# Patient Record
Sex: FEMALE | Race: WHITE | NOT HISPANIC OR LATINO | Employment: OTHER | URBAN - METROPOLITAN AREA
[De-identification: names, ages, dates, MRNs, and addresses within clinical notes are randomized per-mention and may not be internally consistent; named-entity substitution may affect disease eponyms.]

---

## 2017-05-17 ENCOUNTER — SURGERY - HEALTHEAST (OUTPATIENT)
Dept: CARDIOLOGY | Facility: CLINIC | Age: 82
End: 2017-05-17

## 2017-05-17 ASSESSMENT — MIFFLIN-ST. JEOR: SCORE: 1011.43

## 2017-05-18 ASSESSMENT — MIFFLIN-ST. JEOR: SCORE: 1002.36

## 2017-05-31 ENCOUNTER — AMBULATORY - HEALTHEAST (OUTPATIENT)
Dept: CARDIOLOGY | Facility: CLINIC | Age: 82
End: 2017-05-31

## 2017-05-31 DIAGNOSIS — Z95.0 CARDIAC PACEMAKER IN SITU: ICD-10-CM

## 2017-05-31 LAB — HCC DEVICE COMMENTS: NORMAL

## 2017-05-31 ASSESSMENT — MIFFLIN-ST. JEOR: SCORE: 1034.11

## 2017-06-17 ENCOUNTER — AMBULATORY - HEALTHEAST (OUTPATIENT)
Dept: CARDIOLOGY | Facility: CLINIC | Age: 82
End: 2017-06-17

## 2017-06-17 DIAGNOSIS — Z95.0 CARDIAC PACEMAKER IN SITU: ICD-10-CM

## 2017-06-19 LAB — HCC DEVICE COMMENTS: NORMAL

## 2017-08-30 ENCOUNTER — AMBULATORY - HEALTHEAST (OUTPATIENT)
Dept: CARDIOLOGY | Facility: CLINIC | Age: 82
End: 2017-08-30

## 2017-08-30 DIAGNOSIS — Z95.0 CARDIAC PACEMAKER IN SITU: ICD-10-CM

## 2017-08-30 LAB — HCC DEVICE COMMENTS: NORMAL

## 2017-08-30 ASSESSMENT — MIFFLIN-ST. JEOR: SCORE: 1038.65

## 2017-09-26 ENCOUNTER — AMBULATORY - HEALTHEAST (OUTPATIENT)
Dept: CARDIOLOGY | Facility: CLINIC | Age: 82
End: 2017-09-26

## 2017-09-26 ENCOUNTER — RECORDS - HEALTHEAST (OUTPATIENT)
Dept: ADMINISTRATIVE | Facility: OTHER | Age: 82
End: 2017-09-26

## 2017-09-28 ENCOUNTER — AMBULATORY - HEALTHEAST (OUTPATIENT)
Dept: CARDIOLOGY | Facility: CLINIC | Age: 82
End: 2017-09-28

## 2017-09-28 ENCOUNTER — RECORDS - HEALTHEAST (OUTPATIENT)
Dept: ADMINISTRATIVE | Facility: OTHER | Age: 82
End: 2017-09-28

## 2017-10-03 ENCOUNTER — OFFICE VISIT - HEALTHEAST (OUTPATIENT)
Dept: CARDIOLOGY | Facility: CLINIC | Age: 82
End: 2017-10-03

## 2017-10-03 DIAGNOSIS — R55 SYNCOPE, CARDIOGENIC: ICD-10-CM

## 2017-10-03 DIAGNOSIS — I25.83 CORONARY ARTERY DISEASE DUE TO LIPID RICH PLAQUE: ICD-10-CM

## 2017-10-03 DIAGNOSIS — I25.10 CORONARY ARTERY DISEASE DUE TO LIPID RICH PLAQUE: ICD-10-CM

## 2017-10-03 DIAGNOSIS — I48.0 PAROXYSMAL ATRIAL FIBRILLATION (H): ICD-10-CM

## 2017-10-03 ASSESSMENT — MIFFLIN-ST. JEOR: SCORE: 1007.46

## 2017-10-16 ENCOUNTER — RECORDS - HEALTHEAST (OUTPATIENT)
Dept: LAB | Facility: CLINIC | Age: 82
End: 2017-10-16

## 2017-10-16 LAB
CHOLEST SERPL-MCNC: 182 MG/DL
FASTING STATUS PATIENT QL REPORTED: ABNORMAL
HDLC SERPL-MCNC: 47 MG/DL
LDLC SERPL CALC-MCNC: 115 MG/DL
TRIGL SERPL-MCNC: 102 MG/DL

## 2017-10-19 ENCOUNTER — AMBULATORY - HEALTHEAST (OUTPATIENT)
Dept: CARDIOLOGY | Facility: CLINIC | Age: 82
End: 2017-10-19

## 2017-11-27 ENCOUNTER — RECORDS - HEALTHEAST (OUTPATIENT)
Dept: ADMINISTRATIVE | Facility: OTHER | Age: 82
End: 2017-11-27

## 2017-11-28 ENCOUNTER — AMBULATORY - HEALTHEAST (OUTPATIENT)
Dept: CARDIOLOGY | Facility: CLINIC | Age: 82
End: 2017-11-28

## 2017-11-28 DIAGNOSIS — Z95.0 CARDIAC PACEMAKER IN SITU: ICD-10-CM

## 2017-11-28 LAB — HCC DEVICE COMMENTS: NORMAL

## 2017-11-29 ENCOUNTER — HOSPITAL ENCOUNTER (OUTPATIENT)
Dept: CT IMAGING | Facility: CLINIC | Age: 82
Discharge: HOME | End: 2017-11-29
Attending: FAMILY MEDICINE

## 2017-11-29 ENCOUNTER — COMMUNICATION - HEALTHEAST (OUTPATIENT)
Dept: TELEHEALTH | Facility: CLINIC | Age: 82
End: 2017-11-29

## 2017-11-29 DIAGNOSIS — R51.9 FRONTAL HEADACHE: ICD-10-CM

## 2018-01-23 ENCOUNTER — COMMUNICATION - HEALTHEAST (OUTPATIENT)
Dept: CARDIOLOGY | Facility: CLINIC | Age: 83
End: 2018-01-23

## 2018-01-23 DIAGNOSIS — I25.10 CAD (CORONARY ARTERY DISEASE): ICD-10-CM

## 2018-03-15 ENCOUNTER — AMBULATORY - HEALTHEAST (OUTPATIENT)
Dept: CARDIOLOGY | Facility: CLINIC | Age: 83
End: 2018-03-15

## 2018-03-15 DIAGNOSIS — Z95.0 CARDIAC PACEMAKER IN SITU: ICD-10-CM

## 2018-03-16 LAB — HCC DEVICE COMMENTS: NORMAL

## 2018-06-19 ENCOUNTER — AMBULATORY - HEALTHEAST (OUTPATIENT)
Dept: CARDIOLOGY | Facility: CLINIC | Age: 83
End: 2018-06-19

## 2018-06-19 ENCOUNTER — RECORDS - HEALTHEAST (OUTPATIENT)
Dept: ADMINISTRATIVE | Facility: OTHER | Age: 83
End: 2018-06-19

## 2018-06-21 ENCOUNTER — AMBULATORY - HEALTHEAST (OUTPATIENT)
Dept: CARDIOLOGY | Facility: CLINIC | Age: 83
End: 2018-06-21

## 2018-06-21 ENCOUNTER — OFFICE VISIT - HEALTHEAST (OUTPATIENT)
Dept: CARDIOLOGY | Facility: CLINIC | Age: 83
End: 2018-06-21

## 2018-06-21 DIAGNOSIS — Z95.0 CARDIAC PACEMAKER IN SITU: ICD-10-CM

## 2018-06-21 DIAGNOSIS — I50.32 CHRONIC DIASTOLIC CONGESTIVE HEART FAILURE (H): ICD-10-CM

## 2018-06-21 DIAGNOSIS — I49.5 SSS (SICK SINUS SYNDROME) (H): ICD-10-CM

## 2018-06-21 DIAGNOSIS — I25.10 CORONARY ARTERY DISEASE INVOLVING NATIVE HEART WITHOUT ANGINA PECTORIS, UNSPECIFIED VESSEL OR LESION TYPE: ICD-10-CM

## 2018-06-21 DIAGNOSIS — I10 ESSENTIAL HYPERTENSION WITH GOAL BLOOD PRESSURE LESS THAN 140/90: ICD-10-CM

## 2018-06-21 LAB
ANION GAP SERPL CALCULATED.3IONS-SCNC: 10 MMOL/L (ref 5–18)
BUN SERPL-MCNC: 14 MG/DL (ref 8–28)
CALCIUM SERPL-MCNC: 9.7 MG/DL (ref 8.5–10.5)
CHLORIDE BLD-SCNC: 103 MMOL/L (ref 98–107)
CO2 SERPL-SCNC: 26 MMOL/L (ref 22–31)
CREAT SERPL-MCNC: 0.73 MG/DL (ref 0.6–1.1)
GFR SERPL CREATININE-BSD FRML MDRD: >60 ML/MIN/1.73M2
GLUCOSE BLD-MCNC: 83 MG/DL (ref 70–125)
HCC DEVICE COMMENTS: NORMAL
HCC DEVICE IMPLANTING PROVIDER: NORMAL
HCC DEVICE MANUFACTURE: NORMAL
HCC DEVICE MODEL: NORMAL
HCC DEVICE SERIAL NUMBER: NORMAL
HCC DEVICE TYPE: NORMAL
MAGNESIUM SERPL-MCNC: 2.4 MG/DL (ref 1.8–2.6)
POTASSIUM BLD-SCNC: 4.4 MMOL/L (ref 3.5–5)
SODIUM SERPL-SCNC: 139 MMOL/L (ref 136–145)

## 2018-06-21 ASSESSMENT — MIFFLIN-ST. JEOR: SCORE: 981.95

## 2018-06-22 ENCOUNTER — COMMUNICATION - HEALTHEAST (OUTPATIENT)
Dept: CARDIOLOGY | Facility: CLINIC | Age: 83
End: 2018-06-22

## 2018-06-22 DIAGNOSIS — R60.9 EDEMA: ICD-10-CM

## 2018-06-22 LAB — BNP SERPL-MCNC: 99 PG/ML (ref 0–167)

## 2018-06-28 ENCOUNTER — HOSPITAL ENCOUNTER (OUTPATIENT)
Dept: LAB | Age: 83
Setting detail: SPECIMEN
Discharge: HOME | End: 2018-06-28

## 2018-06-28 ENCOUNTER — OFFICE VISIT - HEALTHEAST (OUTPATIENT)
Dept: CARDIOLOGY | Facility: CLINIC | Age: 83
End: 2018-06-28

## 2018-06-28 ENCOUNTER — COMMUNICATION - HEALTHEAST (OUTPATIENT)
Dept: CARDIOLOGY | Facility: CLINIC | Age: 83
End: 2018-06-28

## 2018-06-28 ENCOUNTER — AMBULATORY - HEALTHEAST (OUTPATIENT)
Dept: CARDIOLOGY | Facility: CLINIC | Age: 83
End: 2018-06-28

## 2018-06-28 DIAGNOSIS — I10 ESSENTIAL HYPERTENSION WITH GOAL BLOOD PRESSURE LESS THAN 140/90: ICD-10-CM

## 2018-06-28 DIAGNOSIS — R60.9 EDEMA: ICD-10-CM

## 2018-06-28 DIAGNOSIS — I49.5 SSS (SICK SINUS SYNDROME) (H): ICD-10-CM

## 2018-06-28 DIAGNOSIS — I25.10 CORONARY ARTERY DISEASE INVOLVING NATIVE HEART WITHOUT ANGINA PECTORIS, UNSPECIFIED VESSEL OR LESION TYPE: ICD-10-CM

## 2018-06-28 DIAGNOSIS — I50.32 CHRONIC DIASTOLIC CONGESTIVE HEART FAILURE (H): ICD-10-CM

## 2018-06-28 LAB
ANION GAP SERPL CALCULATED.3IONS-SCNC: 8 MMOL/L (ref 5–18)
BUN SERPL-MCNC: 16 MG/DL (ref 8–28)
CALCIUM SERPL-MCNC: 9.9 MG/DL (ref 8.5–10.5)
CHLORIDE BLD-SCNC: 102 MMOL/L (ref 98–107)
CO2 SERPL-SCNC: 30 MMOL/L (ref 22–31)
CREAT SERPL-MCNC: 0.78 MG/DL (ref 0.6–1.1)
GFR SERPL CREATININE-BSD FRML MDRD: >60 ML/MIN/1.73M2
GLUCOSE BLD-MCNC: 108 MG/DL (ref 70–125)
MAGNESIUM SERPL-MCNC: 2.4 MG/DL (ref 1.8–2.6)
POTASSIUM BLD-SCNC: 4.3 MMOL/L (ref 3.5–5)
SODIUM SERPL-SCNC: 140 MMOL/L (ref 136–145)

## 2018-06-28 ASSESSMENT — MIFFLIN-ST. JEOR: SCORE: 977.42

## 2018-07-11 ENCOUNTER — OFFICE VISIT - HEALTHEAST (OUTPATIENT)
Dept: CARDIOLOGY | Facility: CLINIC | Age: 83
End: 2018-07-11

## 2018-07-11 ENCOUNTER — AMBULATORY - HEALTHEAST (OUTPATIENT)
Dept: CARDIOLOGY | Facility: CLINIC | Age: 83
End: 2018-07-11

## 2018-07-11 DIAGNOSIS — I25.83 CORONARY ARTERY DISEASE DUE TO LIPID RICH PLAQUE: ICD-10-CM

## 2018-07-11 DIAGNOSIS — I49.5 SSS (SICK SINUS SYNDROME) (H): ICD-10-CM

## 2018-07-11 DIAGNOSIS — I50.32 CHRONIC DIASTOLIC CONGESTIVE HEART FAILURE (H): ICD-10-CM

## 2018-07-11 DIAGNOSIS — I25.10 CORONARY ARTERY DISEASE DUE TO LIPID RICH PLAQUE: ICD-10-CM

## 2018-07-11 DIAGNOSIS — I48.0 PAROXYSMAL ATRIAL FIBRILLATION (H): ICD-10-CM

## 2018-07-11 DIAGNOSIS — I10 ESSENTIAL HYPERTENSION WITH GOAL BLOOD PRESSURE LESS THAN 140/90: ICD-10-CM

## 2018-07-11 DIAGNOSIS — R60.9 EDEMA: ICD-10-CM

## 2018-07-11 ASSESSMENT — MIFFLIN-ST. JEOR: SCORE: 968.35

## 2018-07-25 ENCOUNTER — OFFICE VISIT - HEALTHEAST (OUTPATIENT)
Dept: CARDIOLOGY | Facility: CLINIC | Age: 83
End: 2018-07-25

## 2018-07-25 DIAGNOSIS — I25.10 CORONARY ARTERY DISEASE DUE TO LIPID RICH PLAQUE: ICD-10-CM

## 2018-07-25 DIAGNOSIS — I25.83 CORONARY ARTERY DISEASE DUE TO LIPID RICH PLAQUE: ICD-10-CM

## 2018-07-25 DIAGNOSIS — I50.32 CHRONIC DIASTOLIC CONGESTIVE HEART FAILURE (H): ICD-10-CM

## 2018-07-25 DIAGNOSIS — I10 ESSENTIAL HYPERTENSION WITH GOAL BLOOD PRESSURE LESS THAN 140/90: ICD-10-CM

## 2018-07-25 LAB
ANION GAP SERPL CALCULATED.3IONS-SCNC: 9 MMOL/L (ref 5–18)
BUN SERPL-MCNC: 16 MG/DL (ref 8–28)
CALCIUM SERPL-MCNC: 9.8 MG/DL (ref 8.5–10.5)
CHLORIDE BLD-SCNC: 106 MMOL/L (ref 98–107)
CO2 SERPL-SCNC: 26 MMOL/L (ref 22–31)
CREAT SERPL-MCNC: 0.75 MG/DL (ref 0.6–1.1)
GFR SERPL CREATININE-BSD FRML MDRD: >60 ML/MIN/1.73M2
GLUCOSE BLD-MCNC: 125 MG/DL (ref 70–125)
POTASSIUM BLD-SCNC: 4.3 MMOL/L (ref 3.5–5)
SODIUM SERPL-SCNC: 141 MMOL/L (ref 136–145)

## 2018-07-25 ASSESSMENT — MIFFLIN-ST. JEOR: SCORE: 968.35

## 2018-07-26 ENCOUNTER — AMBULATORY - HEALTHEAST (OUTPATIENT)
Dept: CARDIOLOGY | Facility: CLINIC | Age: 83
End: 2018-07-26

## 2018-07-26 DIAGNOSIS — I50.9 HF (HEART FAILURE) (H): ICD-10-CM

## 2018-08-13 ENCOUNTER — COMMUNICATION - HEALTHEAST (OUTPATIENT)
Dept: CARDIOLOGY | Facility: CLINIC | Age: 83
End: 2018-08-13

## 2018-08-13 DIAGNOSIS — I25.10 CAD (CORONARY ARTERY DISEASE): ICD-10-CM

## 2018-08-22 ENCOUNTER — OFFICE VISIT - HEALTHEAST (OUTPATIENT)
Dept: CARDIOLOGY | Facility: CLINIC | Age: 83
End: 2018-08-22

## 2018-08-22 DIAGNOSIS — I25.10 CORONARY ARTERY DISEASE DUE TO LIPID RICH PLAQUE: ICD-10-CM

## 2018-08-22 DIAGNOSIS — R60.9 EDEMA: ICD-10-CM

## 2018-08-22 DIAGNOSIS — I50.32 CHRONIC DIASTOLIC CONGESTIVE HEART FAILURE (H): ICD-10-CM

## 2018-08-22 DIAGNOSIS — I25.83 CORONARY ARTERY DISEASE DUE TO LIPID RICH PLAQUE: ICD-10-CM

## 2018-08-22 DIAGNOSIS — I10 ESSENTIAL HYPERTENSION WITH GOAL BLOOD PRESSURE LESS THAN 140/90: ICD-10-CM

## 2018-08-22 ASSESSMENT — MIFFLIN-ST. JEOR: SCORE: 963.81

## 2018-08-24 ENCOUNTER — COMMUNICATION - HEALTHEAST (OUTPATIENT)
Dept: CARDIOLOGY | Facility: CLINIC | Age: 83
End: 2018-08-24

## 2018-08-24 DIAGNOSIS — I50.9 HF (HEART FAILURE) (H): ICD-10-CM

## 2018-09-21 ENCOUNTER — RECORDS - HEALTHEAST (OUTPATIENT)
Dept: ADMINISTRATIVE | Facility: OTHER | Age: 83
End: 2018-09-21

## 2018-09-21 ENCOUNTER — AMBULATORY - HEALTHEAST (OUTPATIENT)
Dept: CARDIOLOGY | Facility: CLINIC | Age: 83
End: 2018-09-21

## 2018-09-24 ENCOUNTER — AMBULATORY - HEALTHEAST (OUTPATIENT)
Dept: CARDIOLOGY | Facility: CLINIC | Age: 83
End: 2018-09-24

## 2018-09-24 DIAGNOSIS — Z95.0 CARDIAC PACEMAKER IN SITU: ICD-10-CM

## 2018-09-24 LAB
HCC DEVICE COMMENTS: NORMAL
HCC DEVICE IMPLANTING PROVIDER: NORMAL
HCC DEVICE MANUFACTURE: NORMAL
HCC DEVICE MODEL: NORMAL
HCC DEVICE SERIAL NUMBER: NORMAL
HCC DEVICE TYPE: NORMAL

## 2018-09-26 ENCOUNTER — OFFICE VISIT - HEALTHEAST (OUTPATIENT)
Dept: CARDIOLOGY | Facility: CLINIC | Age: 83
End: 2018-09-26

## 2018-09-26 DIAGNOSIS — I49.5 SSS (SICK SINUS SYNDROME) (H): ICD-10-CM

## 2018-09-26 DIAGNOSIS — E78.5 HYPERLIPIDEMIA WITH TARGET LDL LESS THAN 70: ICD-10-CM

## 2018-09-26 DIAGNOSIS — I50.32 CHRONIC DIASTOLIC CONGESTIVE HEART FAILURE (H): ICD-10-CM

## 2018-09-26 DIAGNOSIS — I25.10 CAD (CORONARY ARTERY DISEASE): ICD-10-CM

## 2018-09-26 ASSESSMENT — MIFFLIN-ST. JEOR: SCORE: 954.74

## 2018-10-01 ENCOUNTER — AMBULATORY - HEALTHEAST (OUTPATIENT)
Dept: CARDIOLOGY | Facility: CLINIC | Age: 83
End: 2018-10-01

## 2018-10-01 DIAGNOSIS — I25.10 CAD (CORONARY ARTERY DISEASE): ICD-10-CM

## 2018-10-01 LAB
CHOLEST SERPL-MCNC: 177 MG/DL
FASTING STATUS PATIENT QL REPORTED: YES
HDLC SERPL-MCNC: 49 MG/DL
LDLC SERPL CALC-MCNC: 105 MG/DL
TRIGL SERPL-MCNC: 114 MG/DL

## 2018-10-02 ENCOUNTER — AMBULATORY - HEALTHEAST (OUTPATIENT)
Dept: CARDIOLOGY | Facility: CLINIC | Age: 83
End: 2018-10-02

## 2018-10-02 DIAGNOSIS — E78.5 HYPERLIPIDEMIA LDL GOAL <70: ICD-10-CM

## 2018-10-17 ENCOUNTER — HOSPITAL ENCOUNTER (OUTPATIENT)
Dept: NUCLEAR MEDICINE | Facility: CLINIC | Age: 83
Discharge: HOME | End: 2018-10-17
Attending: INTERNAL MEDICINE

## 2018-10-17 ENCOUNTER — HOSPITAL ENCOUNTER (OUTPATIENT)
Dept: CARDIOLOGY | Facility: CLINIC | Age: 83
Discharge: HOME | End: 2018-10-17
Attending: INTERNAL MEDICINE

## 2018-10-17 DIAGNOSIS — I25.10 CAD (CORONARY ARTERY DISEASE): ICD-10-CM

## 2018-10-17 LAB
CV STRESS CURRENT BP HE: NORMAL
CV STRESS CURRENT HR HE: 63
CV STRESS CURRENT HR HE: 64
CV STRESS CURRENT HR HE: 65
CV STRESS CURRENT HR HE: 85
CV STRESS CURRENT HR HE: 85
CV STRESS CURRENT HR HE: 88
CV STRESS CURRENT HR HE: 89
CV STRESS CURRENT HR HE: 89
CV STRESS CURRENT HR HE: 90
CV STRESS CURRENT HR HE: 90
CV STRESS CURRENT HR HE: 91
CV STRESS CURRENT HR HE: 93
CV STRESS CURRENT HR HE: 96
CV STRESS CURRENT HR HE: 97
CV STRESS DEVIATION TIME HE: NORMAL
CV STRESS ECHO PERCENT HR HE: NORMAL
CV STRESS EXERCISE STAGE HE: NORMAL
CV STRESS FINAL RESTING BP HE: NORMAL
CV STRESS FINAL RESTING HR HE: 85
CV STRESS MAX HR HE: 97
CV STRESS MAX TREADMILL GRADE HE: 0
CV STRESS MAX TREADMILL SPEED HE: 0
CV STRESS PEAK DIA BP HE: NORMAL
CV STRESS PEAK SYS BP HE: NORMAL
CV STRESS PHASE HE: NORMAL
CV STRESS PROTOCOL HE: NORMAL
CV STRESS RESTING PT POSITION HE: NORMAL
CV STRESS ST DEVIATION AMOUNT HE: NORMAL
CV STRESS ST DEVIATION ELEVATION HE: NORMAL
CV STRESS ST EVELATION AMOUNT HE: NORMAL
CV STRESS TEST TYPE HE: NORMAL
CV STRESS TOTAL STAGE TIME MIN 1 HE: NORMAL
NUC STRESS EJECTION FRACTION: 70 %
STRESS ECHO BASELINE BP: NORMAL
STRESS ECHO BASELINE HR: 63
STRESS ECHO CALCULATED PERCENT HR: 75 %
STRESS ECHO LAST STRESS BP: NORMAL
STRESS ECHO LAST STRESS HR: 89

## 2018-11-28 ENCOUNTER — OFFICE VISIT - HEALTHEAST (OUTPATIENT)
Dept: CARDIOLOGY | Facility: CLINIC | Age: 83
End: 2018-11-28

## 2018-11-28 DIAGNOSIS — E78.5 HYPERLIPIDEMIA WITH TARGET LDL LESS THAN 70: ICD-10-CM

## 2018-11-28 DIAGNOSIS — I25.10 CORONARY ARTERY DISEASE DUE TO LIPID RICH PLAQUE: ICD-10-CM

## 2018-11-28 DIAGNOSIS — I25.83 CORONARY ARTERY DISEASE DUE TO LIPID RICH PLAQUE: ICD-10-CM

## 2018-11-28 DIAGNOSIS — I50.32 CHRONIC DIASTOLIC CONGESTIVE HEART FAILURE (H): ICD-10-CM

## 2018-11-28 DIAGNOSIS — I10 ESSENTIAL HYPERTENSION WITH GOAL BLOOD PRESSURE LESS THAN 140/90: ICD-10-CM

## 2018-11-28 ASSESSMENT — MIFFLIN-ST. JEOR: SCORE: 959.27

## 2018-11-29 ENCOUNTER — COMMUNICATION - HEALTHEAST (OUTPATIENT)
Dept: CARDIOLOGY | Facility: CLINIC | Age: 83
End: 2018-11-29

## 2018-12-04 ENCOUNTER — COMMUNICATION - HEALTHEAST (OUTPATIENT)
Dept: CARDIOLOGY | Facility: CLINIC | Age: 83
End: 2018-12-04

## 2019-01-02 ENCOUNTER — AMBULATORY - HEALTHEAST (OUTPATIENT)
Dept: CARDIOLOGY | Facility: CLINIC | Age: 84
End: 2019-01-02

## 2019-01-02 DIAGNOSIS — Z95.0 CARDIAC PACEMAKER IN SITU: ICD-10-CM

## 2019-01-14 ENCOUNTER — OFFICE VISIT - HEALTHEAST (OUTPATIENT)
Dept: CARDIOLOGY | Facility: CLINIC | Age: 84
End: 2019-01-14

## 2019-01-14 DIAGNOSIS — Z95.0 CARDIAC PACEMAKER IN SITU: ICD-10-CM

## 2019-01-14 DIAGNOSIS — E78.5 HYPERLIPIDEMIA WITH TARGET LDL LESS THAN 70: ICD-10-CM

## 2019-01-14 DIAGNOSIS — I49.5 SSS (SICK SINUS SYNDROME) (H): ICD-10-CM

## 2019-01-14 ASSESSMENT — MIFFLIN-ST. JEOR: SCORE: 959.27

## 2019-04-08 ENCOUNTER — AMBULATORY - HEALTHEAST (OUTPATIENT)
Dept: CARDIOLOGY | Facility: CLINIC | Age: 84
End: 2019-04-08

## 2019-04-08 DIAGNOSIS — Z95.0 CARDIAC PACEMAKER IN SITU: ICD-10-CM

## 2019-04-23 ENCOUNTER — RECORDS - HEALTHEAST (OUTPATIENT)
Dept: GENERAL RADIOLOGY | Facility: CLINIC | Age: 84
End: 2019-04-23

## 2019-04-23 ENCOUNTER — OFFICE VISIT - HEALTHEAST (OUTPATIENT)
Dept: FAMILY MEDICINE | Facility: CLINIC | Age: 84
End: 2019-04-23

## 2019-04-23 DIAGNOSIS — Z76.89 ENCOUNTER TO ESTABLISH CARE WITH NEW DOCTOR: ICD-10-CM

## 2019-04-23 DIAGNOSIS — G89.29 OTHER CHRONIC PAIN: ICD-10-CM

## 2019-04-23 DIAGNOSIS — M17.11 PRIMARY OSTEOARTHRITIS OF RIGHT KNEE: ICD-10-CM

## 2019-04-23 DIAGNOSIS — M25.561 CHRONIC PAIN OF RIGHT KNEE: ICD-10-CM

## 2019-04-23 DIAGNOSIS — M79.89 RIGHT LEG SWELLING: ICD-10-CM

## 2019-04-23 DIAGNOSIS — G89.29 CHRONIC PAIN OF RIGHT KNEE: ICD-10-CM

## 2019-04-23 DIAGNOSIS — M25.561 PAIN IN RIGHT KNEE: ICD-10-CM

## 2019-04-23 DIAGNOSIS — F43.23 ADJUSTMENT DISORDER WITH MIXED ANXIETY AND DEPRESSED MOOD: ICD-10-CM

## 2019-04-29 ENCOUNTER — RECORDS - HEALTHEAST (OUTPATIENT)
Dept: ADMINISTRATIVE | Facility: OTHER | Age: 84
End: 2019-04-29

## 2019-06-13 ENCOUNTER — COMMUNICATION - HEALTHEAST (OUTPATIENT)
Dept: CARDIOLOGY | Facility: CLINIC | Age: 84
End: 2019-06-13

## 2019-08-01 ENCOUNTER — OFFICE VISIT - HEALTHEAST (OUTPATIENT)
Dept: CARDIOLOGY | Facility: CLINIC | Age: 84
End: 2019-08-01

## 2019-08-01 ENCOUNTER — AMBULATORY - HEALTHEAST (OUTPATIENT)
Dept: CARDIOLOGY | Facility: CLINIC | Age: 84
End: 2019-08-01

## 2019-08-01 DIAGNOSIS — Z95.0 CARDIAC PACEMAKER IN SITU: ICD-10-CM

## 2019-08-01 DIAGNOSIS — I25.83 CORONARY ARTERY DISEASE DUE TO LIPID RICH PLAQUE: ICD-10-CM

## 2019-08-01 DIAGNOSIS — I25.10 CORONARY ARTERY DISEASE DUE TO LIPID RICH PLAQUE: ICD-10-CM

## 2019-08-01 DIAGNOSIS — I89.0 LYMPHEDEMA OF LOWER EXTREMITY, UNSPECIFIED LATERALITY: ICD-10-CM

## 2019-08-01 DIAGNOSIS — I25.10 CAD (CORONARY ARTERY DISEASE): ICD-10-CM

## 2019-08-01 ASSESSMENT — MIFFLIN-ST. JEOR: SCORE: 945.67

## 2019-08-13 ENCOUNTER — AMBULATORY - HEALTHEAST (OUTPATIENT)
Dept: CARDIOLOGY | Facility: CLINIC | Age: 84
End: 2019-08-13

## 2019-08-13 DIAGNOSIS — I25.83 CORONARY ARTERY DISEASE DUE TO LIPID RICH PLAQUE: ICD-10-CM

## 2019-08-13 DIAGNOSIS — I25.10 CORONARY ARTERY DISEASE DUE TO LIPID RICH PLAQUE: ICD-10-CM

## 2019-08-13 LAB
ALT SERPL W P-5'-P-CCNC: 14 U/L (ref 0–45)
AST SERPL W P-5'-P-CCNC: 21 U/L (ref 0–40)
CHOLEST SERPL-MCNC: 233 MG/DL
FASTING STATUS PATIENT QL REPORTED: YES
HDLC SERPL-MCNC: 54 MG/DL
LDLC SERPL CALC-MCNC: 154 MG/DL
TRIGL SERPL-MCNC: 124 MG/DL

## 2019-08-21 ENCOUNTER — AMBULATORY - HEALTHEAST (OUTPATIENT)
Dept: CARDIOLOGY | Facility: CLINIC | Age: 84
End: 2019-08-21

## 2019-08-21 DIAGNOSIS — E78.5 HYPERLIPIDEMIA WITH TARGET LDL LESS THAN 70: ICD-10-CM

## 2019-08-23 ENCOUNTER — COMMUNICATION - HEALTHEAST (OUTPATIENT)
Dept: CARDIOLOGY | Facility: CLINIC | Age: 84
End: 2019-08-23

## 2019-08-23 DIAGNOSIS — I50.9 HF (HEART FAILURE) (H): ICD-10-CM

## 2019-09-11 ENCOUNTER — RECORDS - HEALTHEAST (OUTPATIENT)
Dept: LAB | Facility: CLINIC | Age: 84
End: 2019-09-11

## 2019-09-11 LAB
ALBUMIN SERPL-MCNC: 3.8 G/DL (ref 3.5–5)
ALP SERPL-CCNC: 82 U/L (ref 45–120)
ALT SERPL W P-5'-P-CCNC: 14 U/L (ref 0–45)
ANION GAP SERPL CALCULATED.3IONS-SCNC: 12 MMOL/L (ref 5–18)
AST SERPL W P-5'-P-CCNC: 24 U/L (ref 0–40)
BILIRUB SERPL-MCNC: 0.4 MG/DL (ref 0–1)
BUN SERPL-MCNC: 15 MG/DL (ref 8–28)
CALCIUM SERPL-MCNC: 9 MG/DL (ref 8.5–10.5)
CHLORIDE BLD-SCNC: 107 MMOL/L (ref 98–107)
CHOLEST SERPL-MCNC: 176 MG/DL
CO2 SERPL-SCNC: 22 MMOL/L (ref 22–31)
CREAT SERPL-MCNC: 0.77 MG/DL (ref 0.6–1.1)
ERYTHROCYTE [DISTWIDTH] IN BLOOD BY AUTOMATED COUNT: 12.6 % (ref 11–14.5)
FASTING STATUS PATIENT QL REPORTED: NORMAL
GFR SERPL CREATININE-BSD FRML MDRD: >60 ML/MIN/1.73M2
GLUCOSE BLD-MCNC: 108 MG/DL (ref 70–125)
HCT VFR BLD AUTO: 44.4 % (ref 35–47)
HDLC SERPL-MCNC: 55 MG/DL
HGB BLD-MCNC: 14.4 G/DL (ref 12–16)
LDLC SERPL CALC-MCNC: 91 MG/DL
MCH RBC QN AUTO: 32.4 PG (ref 27–34)
MCHC RBC AUTO-ENTMCNC: 32.4 G/DL (ref 32–36)
MCV RBC AUTO: 100 FL (ref 80–100)
PLATELET # BLD AUTO: 392 THOU/UL (ref 140–440)
PMV BLD AUTO: 9.4 FL (ref 8.5–12.5)
POTASSIUM BLD-SCNC: 4.1 MMOL/L (ref 3.5–5)
PROT SERPL-MCNC: 6.4 G/DL (ref 6–8)
RBC # BLD AUTO: 4.44 MILL/UL (ref 3.8–5.4)
SODIUM SERPL-SCNC: 141 MMOL/L (ref 136–145)
TRIGL SERPL-MCNC: 149 MG/DL
TSH SERPL DL<=0.005 MIU/L-ACNC: 2.83 UIU/ML (ref 0.3–5)
WBC: 8.3 THOU/UL (ref 4–11)

## 2019-09-12 LAB — 25(OH)D3 SERPL-MCNC: 34.1 NG/ML (ref 30–80)

## 2019-10-23 ENCOUNTER — AMBULATORY - HEALTHEAST (OUTPATIENT)
Dept: CARDIOLOGY | Facility: CLINIC | Age: 84
End: 2019-10-23

## 2019-10-23 DIAGNOSIS — Z95.0 CARDIAC PACEMAKER IN SITU: ICD-10-CM

## 2019-10-23 DIAGNOSIS — I49.5 SSS (SICK SINUS SYNDROME) (H): ICD-10-CM

## 2019-11-13 ENCOUNTER — COMMUNICATION - HEALTHEAST (OUTPATIENT)
Dept: CARDIOLOGY | Facility: CLINIC | Age: 84
End: 2019-11-13

## 2019-12-07 ENCOUNTER — COMMUNICATION - HEALTHEAST (OUTPATIENT)
Dept: FAMILY MEDICINE | Facility: CLINIC | Age: 84
End: 2019-12-07

## 2019-12-07 DIAGNOSIS — F43.23 ADJUSTMENT DISORDER WITH MIXED ANXIETY AND DEPRESSED MOOD: ICD-10-CM

## 2020-01-27 ENCOUNTER — AMBULATORY - HEALTHEAST (OUTPATIENT)
Dept: CARDIOLOGY | Facility: CLINIC | Age: 85
End: 2020-01-27

## 2020-01-27 DIAGNOSIS — Z95.0 CARDIAC PACEMAKER IN SITU: ICD-10-CM

## 2020-01-27 DIAGNOSIS — I49.5 SSS (SICK SINUS SYNDROME) (H): ICD-10-CM

## 2020-04-29 ENCOUNTER — AMBULATORY - HEALTHEAST (OUTPATIENT)
Dept: CARDIOLOGY | Facility: CLINIC | Age: 85
End: 2020-04-29

## 2020-04-29 DIAGNOSIS — I49.5 SSS (SICK SINUS SYNDROME) (H): ICD-10-CM

## 2020-04-29 DIAGNOSIS — Z95.0 CARDIAC PACEMAKER IN SITU: ICD-10-CM

## 2020-05-26 ENCOUNTER — COMMUNICATION - HEALTHEAST (OUTPATIENT)
Dept: ADMINISTRATIVE | Facility: CLINIC | Age: 85
End: 2020-05-26

## 2020-06-10 ENCOUNTER — OFFICE VISIT - HEALTHEAST (OUTPATIENT)
Dept: CARDIOLOGY | Facility: CLINIC | Age: 85
End: 2020-06-10

## 2020-06-10 DIAGNOSIS — I49.5 SSS (SICK SINUS SYNDROME) (H): ICD-10-CM

## 2020-06-10 DIAGNOSIS — I25.83 CORONARY ARTERY DISEASE DUE TO LIPID RICH PLAQUE: ICD-10-CM

## 2020-06-10 DIAGNOSIS — Z95.0 CARDIAC PACEMAKER IN SITU: ICD-10-CM

## 2020-06-10 DIAGNOSIS — I89.0 LYMPHEDEMA: ICD-10-CM

## 2020-06-10 DIAGNOSIS — E78.5 HYPERLIPIDEMIA WITH TARGET LDL LESS THAN 70: ICD-10-CM

## 2020-06-10 DIAGNOSIS — I25.10 CORONARY ARTERY DISEASE DUE TO LIPID RICH PLAQUE: ICD-10-CM

## 2020-06-10 DIAGNOSIS — I10 ESSENTIAL HYPERTENSION WITH GOAL BLOOD PRESSURE LESS THAN 140/90: ICD-10-CM

## 2020-08-12 ENCOUNTER — COMMUNICATION - HEALTHEAST (OUTPATIENT)
Dept: HOME HEALTH SERVICES | Facility: HOME HEALTH | Age: 85
End: 2020-08-12

## 2020-08-12 ENCOUNTER — OFFICE VISIT - HEALTHEAST (OUTPATIENT)
Dept: VASCULAR SURGERY | Facility: CLINIC | Age: 85
End: 2020-08-12

## 2020-08-12 ENCOUNTER — HOME CARE/HOSPICE - HEALTHEAST (OUTPATIENT)
Dept: HOME HEALTH SERVICES | Facility: HOME HEALTH | Age: 85
End: 2020-08-12

## 2020-08-12 DIAGNOSIS — I87.2 VENOUS INSUFFICIENCY OF BOTH LOWER EXTREMITIES: ICD-10-CM

## 2020-08-12 DIAGNOSIS — I50.32 CHRONIC DIASTOLIC CONGESTIVE HEART FAILURE (H): ICD-10-CM

## 2020-08-12 DIAGNOSIS — I25.10 ATHEROSCLEROSIS OF NATIVE CORONARY ARTERY OF NATIVE HEART WITHOUT ANGINA PECTORIS: ICD-10-CM

## 2020-08-12 DIAGNOSIS — M79.89 LEG SWELLING: ICD-10-CM

## 2020-08-12 DIAGNOSIS — R54 ADVANCED AGE: ICD-10-CM

## 2020-08-12 DIAGNOSIS — I89.0 ACQUIRED LYMPHEDEMA OF LEG: ICD-10-CM

## 2020-08-12 DIAGNOSIS — I87.303 VENOUS HYPERTENSION OF LOWER EXTREMITY, BILATERAL: ICD-10-CM

## 2020-08-13 ENCOUNTER — COMMUNICATION - HEALTHEAST (OUTPATIENT)
Dept: FAMILY MEDICINE | Facility: CLINIC | Age: 85
End: 2020-08-13

## 2020-08-17 ENCOUNTER — COMMUNICATION - HEALTHEAST (OUTPATIENT)
Dept: FAMILY MEDICINE | Facility: CLINIC | Age: 85
End: 2020-08-17

## 2020-08-25 ENCOUNTER — AMBULATORY - HEALTHEAST (OUTPATIENT)
Dept: CARDIOLOGY | Facility: CLINIC | Age: 85
End: 2020-08-25

## 2020-08-25 ENCOUNTER — RECORDS - HEALTHEAST (OUTPATIENT)
Dept: ADMINISTRATIVE | Facility: OTHER | Age: 85
End: 2020-08-25

## 2020-08-25 DIAGNOSIS — Z95.0 CARDIAC PACEMAKER IN SITU: ICD-10-CM

## 2020-08-25 DIAGNOSIS — I49.5 SSS (SICK SINUS SYNDROME) (H): ICD-10-CM

## 2020-08-26 ENCOUNTER — COMMUNICATION - HEALTHEAST (OUTPATIENT)
Dept: FAMILY MEDICINE | Facility: CLINIC | Age: 85
End: 2020-08-26

## 2020-10-02 ENCOUNTER — RECORDS - HEALTHEAST (OUTPATIENT)
Dept: LAB | Facility: CLINIC | Age: 85
End: 2020-10-02

## 2020-10-02 LAB
ANION GAP SERPL CALCULATED.3IONS-SCNC: 11 MMOL/L (ref 5–18)
BUN SERPL-MCNC: 20 MG/DL (ref 8–28)
CALCIUM SERPL-MCNC: 9.2 MG/DL (ref 8.5–10.5)
CHLORIDE BLD-SCNC: 104 MMOL/L (ref 98–107)
CHOLEST SERPL-MCNC: 214 MG/DL
CO2 SERPL-SCNC: 26 MMOL/L (ref 22–31)
CREAT SERPL-MCNC: 0.72 MG/DL (ref 0.6–1.1)
FASTING STATUS PATIENT QL REPORTED: ABNORMAL
GFR SERPL CREATININE-BSD FRML MDRD: >60 ML/MIN/1.73M2
GLUCOSE BLD-MCNC: 108 MG/DL (ref 70–125)
HDLC SERPL-MCNC: 48 MG/DL
LDLC SERPL CALC-MCNC: 128 MG/DL
POTASSIUM BLD-SCNC: 4.4 MMOL/L (ref 3.5–5)
SODIUM SERPL-SCNC: 141 MMOL/L (ref 136–145)
TRIGL SERPL-MCNC: 188 MG/DL

## 2020-10-12 ENCOUNTER — OFFICE VISIT - HEALTHEAST (OUTPATIENT)
Dept: VASCULAR SURGERY | Facility: CLINIC | Age: 85
End: 2020-10-12

## 2020-10-12 DIAGNOSIS — I50.32 CHRONIC DIASTOLIC CONGESTIVE HEART FAILURE (H): ICD-10-CM

## 2020-10-12 DIAGNOSIS — I87.303 VENOUS HYPERTENSION OF LOWER EXTREMITY, BILATERAL: ICD-10-CM

## 2020-10-12 DIAGNOSIS — I87.2 VENOUS INSUFFICIENCY OF BOTH LOWER EXTREMITIES: ICD-10-CM

## 2020-10-12 DIAGNOSIS — M79.89 LEG SWELLING: ICD-10-CM

## 2020-10-12 DIAGNOSIS — I89.0 ACQUIRED LYMPHEDEMA OF LEG: ICD-10-CM

## 2020-11-30 ENCOUNTER — AMBULATORY - HEALTHEAST (OUTPATIENT)
Dept: CARDIOLOGY | Facility: CLINIC | Age: 85
End: 2020-11-30

## 2020-11-30 DIAGNOSIS — Z95.0 CARDIAC PACEMAKER IN SITU: ICD-10-CM

## 2020-11-30 DIAGNOSIS — I49.5 SSS (SICK SINUS SYNDROME) (H): ICD-10-CM

## 2021-01-28 ENCOUNTER — COMMUNICATION - HEALTHEAST (OUTPATIENT)
Dept: CARDIOLOGY | Facility: CLINIC | Age: 86
End: 2021-01-28

## 2021-02-08 ENCOUNTER — OFFICE VISIT - HEALTHEAST (OUTPATIENT)
Dept: CARDIOLOGY | Facility: CLINIC | Age: 86
End: 2021-02-08

## 2021-02-08 DIAGNOSIS — R55 SYNCOPE: ICD-10-CM

## 2021-02-08 DIAGNOSIS — I25.10 CAD (CORONARY ARTERY DISEASE): ICD-10-CM

## 2021-03-05 ENCOUNTER — AMBULATORY - HEALTHEAST (OUTPATIENT)
Dept: CARDIOLOGY | Facility: CLINIC | Age: 86
End: 2021-03-05

## 2021-03-05 DIAGNOSIS — Z95.0 CARDIAC PACEMAKER IN SITU: ICD-10-CM

## 2021-03-05 DIAGNOSIS — I49.5 SSS (SICK SINUS SYNDROME) (H): ICD-10-CM

## 2021-03-24 ENCOUNTER — COMMUNICATION - HEALTHEAST (OUTPATIENT)
Dept: CARDIOLOGY | Facility: CLINIC | Age: 86
End: 2021-03-24

## 2021-03-24 ENCOUNTER — OFFICE VISIT - HEALTHEAST (OUTPATIENT)
Dept: CARDIOLOGY | Facility: CLINIC | Age: 86
End: 2021-03-24

## 2021-03-24 DIAGNOSIS — I87.2 VENOUS INSUFFICIENCY OF BOTH LOWER EXTREMITIES: ICD-10-CM

## 2021-03-24 DIAGNOSIS — I49.5 SSS (SICK SINUS SYNDROME) (H): ICD-10-CM

## 2021-03-24 DIAGNOSIS — I25.10 ATHEROSCLEROTIC HEART DISEASE OF NATIVE CORONARY ARTERY WITHOUT ANGINA PECTORIS: ICD-10-CM

## 2021-03-24 DIAGNOSIS — I25.10 CAD (CORONARY ARTERY DISEASE): ICD-10-CM

## 2021-03-24 ASSESSMENT — MIFFLIN-ST. JEOR: SCORE: 945.67

## 2021-05-28 ENCOUNTER — RECORDS - HEALTHEAST (OUTPATIENT)
Dept: ADMINISTRATIVE | Facility: CLINIC | Age: 86
End: 2021-05-28

## 2021-05-28 NOTE — PATIENT INSTRUCTIONS - HE
Leg swelling:  -suspect venous stasis which means blood vessels in the leg are somewhat leaky causing swelling  -treatment for this includes: wearing compression stockings every day (taking off at night and to shower), elevating legs when sitting  -we could consider referral to [facility] to help with this but isn't necessary     Right knee pain:  -suspect from arthritis - xray done today to check for this; both Dr Lopez and radiologist will look at xray to see if there are signs of arthritis - severe arthritis seen  -treatment for arthritis includes: tylenol 500-1000mg three times a day, ice/heat to area, could try physical therapy, possibly knee injections - sometimes people will do surgery to replace their knee but this comes with risks  -will schedule appointment with orthopedic doctor

## 2021-05-28 NOTE — PROGRESS NOTES
Assessment/Plan:    1. Encounter to establish care with new doctor  Establishing care at our clinic today as it is closer to her home    2. Right leg swelling  Exam and history consistent with chronic venous stasis.  DVT was ruled out in the ER.  No evidence of cellulitis or other infection.  Recommended wearing compression stockings every day (removing to shower and at night) and elevating legs while sitting.  Discussed we could consider referral to [facility] vascular lymphedema clinic in the future if desired.    3. Adjustment disorder with mixed anxiety and depressed mood  Patient reports history of mild anxiety and depression, mostly related to loss of family members, life stressors and current living facility.  States she takes Zoloft for this and it is helpful, requests refill today which was provided.  - sertraline (ZOLOFT) 50 MG tablet; Take 1 tablet (50 mg total) by mouth daily.  Dispense: 90 tablet; Refill: 1    4. Chronic pain of right knee  5.  Primary osteoarthritis of right knee  Patient reports chronic pain of right knee, suspicious for arthritis.  X-ray obtained today which shows moderate to severe osteoarthritic changes.  Discussed management including: Tylenol up to 1000 mg 3 times daily, no NSAIDs given cardiac history and age, ice/heat to area, consideration of physical therapy, potential knee injections and possible referral to orthopedic surgeon.  Patient requests referral to orthopedic surgeon at this time, stating she would like to speak with the specialist and would potentially be interested in surgery if recommended.  - XR Knee Right 1 or 2 VWS; Future      Follow up: 3 months for recheck, sooner if needed    Dee Lopez MD  [facility]    Subjective:    Patient ID: Isamar Little is a 89 y.o. female is here today for ER follow-up, leg swelling    ER follow-up, right leg swelling  -Patient was seen at [facility] ER on 4/18 for right leg swelling, normal labs (BMP, BNP,  "CBC), US negative for DVT  -Right lower leg has been swollen for \"a good year\"  -has been using compression stockings - takes off when they hurt  -no hx injury/surgeries on the right leg - hx stents but doesn't remember what leg they used for this  -pain is located in the knee not in the calf - knee is somewhat swollen, states she has had knee pain for years but seems to be worsening over time, has been taking Tylenol for this which is not helpful, she is worried about arthritis that she states many family members have had this  -no redness      Patient Active Problem List   Diagnosis     Essential hypertension with goal blood pressure less than 140/90     Anxiety     Hyperlipidemia with target LDL less than 70     Adjustment disorder with mixed anxiety and depressed mood     Skin cancer     Coronary artery disease due to lipid rich plaque     Paroxysmal atrial fibrillation (H)     SSS (sick sinus syndrome) (H)     Cardiac pacemaker, dual, in situ     Chronic diastolic congestive heart failure (H)     Closed fracture of tooth with routine healing     Past Medical History:   Diagnosis Date     Anxiety state, unspecified      Bereavement, uncomplicated      Coronary atherosclerosis of native coronary artery      Depressive disorder, not elsewhere classified      Edema      Essential hypertension, benign      Other and unspecified hyperlipidemia      Other malaise and fatigue      Skin cancer      Past Surgical History:   Procedure Laterality Date     ANGIOPLASTY  06/2008    stents x3     CAPSULOTOMY Left 1/2014     CARDIAC PACEMAKER PLACEMENT       CATARACT EXTRACTION, BILATERAL  1/2010     DILATION AND CURETTAGE OF UTERUS      X3     TONSILLECTOMY AND ADENOIDECTOMY       Current Outpatient Medications on File Prior to Visit   Medication Sig Dispense Refill     acetaminophen (TYLENOL) 500 MG tablet Take 500-1,000 mg by mouth every 6 (six) hours as needed for pain.        aspirin 81 MG EC tablet Take 81 mg by mouth at " bedtime.        atenolol (TENORMIN) 25 MG tablet Take 1 tablet (25 mg total) by mouth daily. (Patient taking differently: Take 12.5 mg by mouth daily. ) 30 tablet 3     BIOTIN ORAL Take 1 capsule by mouth daily.       calcium-vitamin D (CALCIUM-VITAMIN D) 500 mg(1,250mg) -200 unit per tablet Take 1 tablet by mouth daily.       furosemide (LASIX) 20 MG tablet Take 1 tablet (20 mg total) by mouth daily. 90 tablet 3     isosorbide mononitrate (IMDUR) 30 MG 24 hr tablet Take 1 tablet (30 mg total) by mouth daily. 30 tablet 11     mirtazapine (REMERON) 15 MG tablet Take 15 mg by mouth at bedtime as needed.              multivitamin therapeutic (THERAGRAN) tablet Take 1 tablet by mouth daily.       naproxen sodium (ALEVE) 220 MG tablet Take 220 mg by mouth every 12 (twelve) hours as needed for pain.        nitroglycerin (NITROSTAT) 0.4 MG SL tablet Place 1 tablet (0.4 mg total) under the tongue every 5 (five) minutes as needed for chest pain. 1 Bottle 2     OMEGA-3/DHA/EPA/FISH OIL (FISH OIL-OMEGA-3 FATTY ACIDS) 300-1,000 mg capsule Take 2 g by mouth daily.       omeprazole (PRILOSEC) 20 MG capsule Take 20 mg by mouth daily as needed. Take when using NSAIDS (ibuprofen, naproxen, etc)       peg 400-propylene glycol PF (SYSTANE) 0.4-0.3 % Dpet Administer 1 drop to both eyes 2 (two) times a day as needed.       PRAMOXINE/MENTHOL/PETROLATUM (SARNA ULTRA TOP) Apply 1 application topically daily as needed.              pravastatin (PRAVACHOL) 40 MG tablet Take 20 mg by mouth at bedtime .             No current facility-administered medications on file prior to visit.      No Known Allergies  Social History     Socioeconomic History     Marital status:      Spouse name: Not on file     Number of children: Not on file     Years of education: Not on file     Highest education level: Not on file   Occupational History     Not on file   Social Needs     Financial resource strain: Not on file     Food insecurity:     Worry:  Not on file     Inability: Not on file     Transportation needs:     Medical: Not on file     Non-medical: Not on file   Tobacco Use     Smoking status: Never Smoker     Smokeless tobacco: Never Used   Substance and Sexual Activity     Alcohol use: No     Drug use: No     Sexual activity: Not Currently     Partners: Male     Birth control/protection: Post-menopausal   Lifestyle     Physical activity:     Days per week: Not on file     Minutes per session: Not on file     Stress: Not on file   Relationships     Social connections:     Talks on phone: Not on file     Gets together: Not on file     Attends Congregational service: Not on file     Active member of club or organization: Not on file     Attends meetings of clubs or organizations: Not on file     Relationship status: Not on file     Intimate partner violence:     Fear of current or ex partner: Not on file     Emotionally abused: Not on file     Physically abused: Not on file     Forced sexual activity: Not on file   Other Topics Concern     Not on file   Social History Narrative     Not on file     Review of systems is as stated in HPI, and the remainder of system review is otherwise negative.    Objective:      /70   Pulse 84   Wt 142 lb 6 oz (64.6 kg)   BMI 28.76 kg/m      General appearance: awake, NAD  HEENT: atraumatic, normocephalic, no scleral icterus or injection, ears and nose grossly normal, moist mucous membranes  Lungs: breathing comfortably on room air  Extremities: 2+ swelling of right leg and 1+ swelling of left leg, nonpitting, moving all extremities, strong peripheral pulses bilaterally; tenderness of right knee with palpation of lateral joint line and pain with ROM  Skin: Lesion on forehead the patient states his skin cancer being treated  Neuro: alert, CNs grossly intact, no focal deficits appreciated, using cane for ambulatory assistance  Psych: normal mood/affect/behavior, answering questions appropriately, linear thought  process

## 2021-05-29 ENCOUNTER — RECORDS - HEALTHEAST (OUTPATIENT)
Dept: ADMINISTRATIVE | Facility: CLINIC | Age: 86
End: 2021-05-29

## 2021-05-29 NOTE — TELEPHONE ENCOUNTER
Pt LVM stating no one called her to make an appt with Dr Rene, and now she is due.  Message sent to scheduling.  -shamir

## 2021-05-30 ENCOUNTER — RECORDS - HEALTHEAST (OUTPATIENT)
Dept: ADMINISTRATIVE | Facility: CLINIC | Age: 86
End: 2021-05-30

## 2021-05-30 VITALS — BODY MASS INDEX: 26.13 KG/M2 | HEIGHT: 62 IN | WEIGHT: 142 LBS

## 2021-05-30 VITALS — HEIGHT: 62 IN | BODY MASS INDEX: 27.42 KG/M2 | WEIGHT: 149 LBS

## 2021-05-31 ENCOUNTER — RECORDS - HEALTHEAST (OUTPATIENT)
Dept: ADMINISTRATIVE | Facility: CLINIC | Age: 86
End: 2021-05-31

## 2021-05-31 VITALS — BODY MASS INDEX: 27.6 KG/M2 | HEIGHT: 62 IN | WEIGHT: 150 LBS

## 2021-05-31 VITALS — BODY MASS INDEX: 29.64 KG/M2 | WEIGHT: 151 LBS | HEIGHT: 60 IN

## 2021-05-31 NOTE — PATIENT INSTRUCTIONS - HE
Please call my nurse Hortensia with any heart related questions.We are going to plan a cholesterol blood test and I will call with results.[phone]

## 2021-05-31 NOTE — PROGRESS NOTES
Notes recorded by Rufino Rene MD on 8/21/2019 at 12:01 PM CDT  She told me she was taking it during our last visit.  If we can indeed confirm that she is not taking it I would suggest stopping pravastatin or not reinitiating pravastatin and starting atorvastatin 20 mg daily.  She should monitor for symptoms of muscle aches or pains and plan a lipid and AST and ALT in 2 months.mdg  ------    Notes recorded by Hortensia Velez RN on 8/21/2019 at 11:51 AM CDT  Results and recommendations relayed to pt.  Pt verbalized understanding, and states she is not taking pravastatin, nor would she be opposed to taking pravastatin.  Pt does not recall who stopped pravastatin, nor when, and she does not recall if she had side effects from medication.    Dr Rene - any new recommendations?  -[facility]  ------    Notes recorded by Rufino Rene MD on 8/20/2019 at 9:45 PM CDT  Cholesterol labs much higher, she said she was taking the pravastatin and wasn't 10months ago, this result would indicate she may not be taking it  Can we inquire? ty mdg

## 2021-05-31 NOTE — PROGRESS NOTES
to call.  -[facility]    ----- Message -----  From: Nan Bianchi  Sent: 8/21/2019   2:36 PM  To: Hortensia Velez RN    General phone call:    Caller: Patient  Primary cardiologist: Dr. Rene  Detailed reason for call: Patient called you back and would like for you to call her back.  She may be sitting outside and not have the phone with her.    New or active symptoms?   Best phone number: [phone]  Best time to contact: Now  Ok to leave a detailed message? yes  Device? no    Additional Info:

## 2021-05-31 NOTE — PROGRESS NOTES
FLP / AST / ALT ordered, atorvastatin rx sent.  Pravastatin D/Cd.  -[facility]

## 2021-05-31 NOTE — PROGRESS NOTES
In clinic device check with Device RN followed by office visit with Dr. Rene.  Please see link for full device report.  Patient was informed of results and next follow up during today's visit.

## 2021-06-01 VITALS — HEIGHT: 59 IN | BODY MASS INDEX: 29.84 KG/M2 | WEIGHT: 148 LBS

## 2021-06-01 VITALS — HEIGHT: 59 IN | BODY MASS INDEX: 29.23 KG/M2 | WEIGHT: 145 LBS

## 2021-06-01 VITALS — HEIGHT: 59 IN | BODY MASS INDEX: 29.03 KG/M2 | WEIGHT: 144 LBS

## 2021-06-01 VITALS — BODY MASS INDEX: 29.23 KG/M2 | HEIGHT: 59 IN | WEIGHT: 145 LBS

## 2021-06-01 VITALS — WEIGHT: 142 LBS | BODY MASS INDEX: 28.63 KG/M2 | HEIGHT: 59 IN

## 2021-06-01 VITALS — HEIGHT: 59 IN | WEIGHT: 147 LBS | BODY MASS INDEX: 29.64 KG/M2

## 2021-06-02 ENCOUNTER — RECORDS - HEALTHEAST (OUTPATIENT)
Dept: ADMINISTRATIVE | Facility: CLINIC | Age: 86
End: 2021-06-02

## 2021-06-02 VITALS — WEIGHT: 143 LBS | HEIGHT: 59 IN | BODY MASS INDEX: 28.83 KG/M2

## 2021-06-02 VITALS — BODY MASS INDEX: 28.83 KG/M2 | WEIGHT: 143 LBS | HEIGHT: 59 IN

## 2021-06-02 VITALS — WEIGHT: 142.38 LBS | BODY MASS INDEX: 28.76 KG/M2

## 2021-06-03 VITALS — WEIGHT: 140 LBS | BODY MASS INDEX: 28.22 KG/M2 | HEIGHT: 59 IN

## 2021-06-03 NOTE — TELEPHONE ENCOUNTER
"Pt LVM stating she thinks she needs an appt with Dr Rene because she has \"been having some chest pains, has no pep, and no appetite.\"  Pt also stated she is having some hearing aide issues and will call back when she gets home from the hearing aide place.  -rah  "

## 2021-06-03 NOTE — TELEPHONE ENCOUNTER
"----- Message from Nadir Hopkinsmayelaor sent at 11/13/2019  1:34 PM CST -----  Regarding: Concern?  General phone call:    Caller: Pt    Primary cardiologist: Dr Rene    Detailed reason for call: Pt called in to sched apt to see Dr Rene (not 'due' til February) wanted to be seen next available -   I explained availability and offered the next opening (which would be in January)  All she said was \"I'll be DEAD BY THEN\" and hung up.      So I'm not sure what the concern or worry is - but can you give the Pt a call?  If it is something that could be addressed by NP or RAC?      New or active symptoms? ?  Best phone number: ? [phone]  Best time to contact: any  Ok to leave a detailed message? Yes?  Device? No?    Additional Info:       "

## 2021-06-04 NOTE — TELEPHONE ENCOUNTER
Refill Approved    Rx renewed per Medication Renewal Policy. Medication was last renewed on 4/23/2019.    Rolando Frederick, [facility] Triage/Med Refill 12/8/2019     Requested Prescriptions   Pending Prescriptions Disp Refills     sertraline (ZOLOFT) 50 MG tablet [Pharmacy Med Name: Sertraline HCl Oral Tablet 50 MG] 90 tablet 0     Sig: Take 1 tablet (50 mg total) by mouth daily.       SSRI Refill Protocol  Passed - 12/7/2019  2:02 PM        Passed - PCP or prescribing provider visit in last year     Last office visit with prescriber/PCP: 4/23/2019 Dee Lopez MD OR same dept: 4/23/2019 Dee Lopez MD OR same specialty: 4/23/2019 Dee Lopez MD  Last physical: Visit date not found Last MTM visit: Visit date not found   Next visit within 3 mo: Visit date not found  Next physical within 3 mo: Visit date not found  Prescriber OR PCP: Dee Lopez MD  Last diagnosis associated with med order: 1. Adjustment disorder with mixed anxiety and depressed mood  - sertraline (ZOLOFT) 50 MG tablet [Pharmacy Med Name: Sertraline HCl Oral Tablet 50 MG]; Take 1 tablet (50 mg total) by mouth daily.  Dispense: 90 tablet; Refill: 0    If protocol passes may refill for 12 months if within 3 months of last provider visit (or a total of 15 months).

## 2021-06-05 VITALS
HEART RATE: 72 BPM | RESPIRATION RATE: 16 BRPM | DIASTOLIC BLOOD PRESSURE: 70 MMHG | BODY MASS INDEX: 28.22 KG/M2 | HEIGHT: 59 IN | WEIGHT: 140 LBS | SYSTOLIC BLOOD PRESSURE: 110 MMHG

## 2021-06-07 ENCOUNTER — AMBULATORY - HEALTHEAST (OUTPATIENT)
Dept: CARDIOLOGY | Facility: CLINIC | Age: 86
End: 2021-06-07

## 2021-06-07 DIAGNOSIS — I49.5 SSS (SICK SINUS SYNDROME) (H): ICD-10-CM

## 2021-06-07 DIAGNOSIS — Z95.0 CARDIAC PACEMAKER IN SITU: ICD-10-CM

## 2021-06-08 NOTE — PATIENT INSTRUCTIONS - HE
It was nice to visit with you today.  We talked about monitoring your blood pressure and calling with 6-8 blood pressure readings over the next month.  Please call with any increasing shortness of breath or leg swelling or chest discomfort.  Please monitor your legs closely for any skin breakdown and notify your primary care physician or myself with any worsening swelling.  I had previously made a referral to the lymphedema clinic the leg swelling clinic and will try to make a repeat referral.  My nurse is Hortensia and her number is [phone].  Would like to follow-up in approximately 4 months.

## 2021-06-09 ENCOUNTER — RECORDS - HEALTHEAST (OUTPATIENT)
Dept: ADMINISTRATIVE | Facility: CLINIC | Age: 86
End: 2021-06-09

## 2021-06-10 NOTE — TELEPHONE ENCOUNTER
Reason contacted:  Orders request  Information relayed:  Notified ok for order per Dr. Love. Please advise if this is not ok   Additional questions:  No  Further follow-up needed:  No  Okay to leave a detailed message:  No

## 2021-06-10 NOTE — TELEPHONE ENCOUNTER
Please help arrange an annual wellness visit for patient.       Reason contacted:  Orders request  Information relayed:  Notified ok for requested occupational therapy order per Dr. Lopez.   Additional questions:  No  Further follow-up needed:  No  Okay to leave a detailed message:  No

## 2021-06-10 NOTE — TELEPHONE ENCOUNTER
We received a home care referral for this patient and due to our capacity, we have vended this referral to interim home care. Thank you!

## 2021-06-10 NOTE — TELEPHONE ENCOUNTER
FYI - Status Update  Who is Calling: Home Care  Update: Caller stated that patient does not need physical therapy but will need occupational therapy and the nurse will call back to set up those orders.   Okay to leave a detailed message?:  No return call needed

## 2021-06-10 NOTE — TELEPHONE ENCOUNTER
Orders being requested: Occupational Therapy once a week for four weeks.  Reason service is needed/diagnosis: Edema management in both legs.  When are orders needed by: As Able  Where to send Orders: Please provide verbal orders to Maria Elena at: [phone].  Okay to leave detailed message?  Yes

## 2021-06-10 NOTE — TELEPHONE ENCOUNTER
Orders being requested: Home  Care   To delay start of care for PT and OT until Sunday 8/16/2020  Reason service is needed/diagnosis: lymphedema   When are orders needed by: 8/16/2020  Where to send Orders: Phone:  verbal orders to caller   Okay to leave detailed message?  Yes

## 2021-06-10 NOTE — PROGRESS NOTES
"DEVICE CLINIC RN POST-OP NOTE    Reason for visit: Post-op pacemaker check and wound assessment     Device: Medtronic Jaspal PATEL MRI  Procedure date: 5/17/2017  Implant Diagnosis: Syncope, SSS  Implanting Physician: Dr. David Patel    Assessment  Subjective: \"I feel great.\" Isamar states she had a wonderful experience at [facility] and [facility].   Vitals:   Vitals:    05/31/17 0813   BP: 100/70   Pulse: 76   Resp: 18   Temp: 97.8  F (36.6  C)     Heart Sounds: normal  Lung Sounds: clear to auscultation bilaterally  Incision/device pocket: The steri-strips were removed from the incision and it was cleansed with adhesive remover and alcohol wipes. The incision is clean, dry and intact. There are no signs of infection present.      Device Findings  Interrogation of device reveals normal sensing and capture thresholds  See the Paceart Report for a full summary of the device information.          Patient Education      [facility]'s Partnership Agreement for Device Patients and Post-operative Checklist were presented and reviewed with patient at today's appointment.    Signs and symptoms of infection, poor wound healing, and device function were reviewed. Range of motion and weight restrictions for the left side are four weeks. She was issued a logolineup remote monitor and instructed on its set-up and use for remote monitoring by the [employer] representative prior to hospital discharge. These instructions were reviewed with the patient at today s visit.       Plan  Remote in 1 month on 6/22/17  Return in 3 months on 8/30/17    Soledad Velazquez RN BSN  [facility]          "

## 2021-06-10 NOTE — PATIENT INSTRUCTIONS - HE
Lymphedema therapy was ordered, this will be done by home care.    You should expect a call from [facility] within the next 2 business days. They will want to schedule a time with you to come out to your home. If you need to reach [facility] please call them at [phone].      Therapy will come 3 times a week for 3-5 weeks to work on your right leg, doing lymphatic massage and compression bandaging.     Your therapist will help you measure your legs and order velcro compression stockings which you will wear daily after therapy ends to keep your swelling controlled.     Velcro compression can be ordered over the counter through [employer]. Please have your therapist help you or call our clinic.   [employer] Customer Service at [phone]        Please call the clinic at [phone] with any questions.  Follow up in 2 months for a video visit.

## 2021-06-10 NOTE — PROGRESS NOTES
"Isamar Little is a 89 y.o. female who is being evaluated via a billable video visit.      The patient has been notified of following:     \"This video visit will be conducted via a call between you and your physician/provider. We have found that certain health care needs can be provided without the need for an in-person physical exam.  This service lets us provide the care you need with a video conversation.  If a prescription is necessary we can send it directly to your pharmacy.  If lab work is needed we can place an order for that and you can then stop by our lab to have the test done at a later time.    Video visits are billed at different rates depending on your insurance coverage. Please reach out to your insurance provider with any questions.    If during the course of the call the physician/provider feels a video visit is not appropriate, you will not be charged for this service.\"    Patient has given verbal consent to a Video visit? Yes  How would you like to obtain your AVS? AVS Preference: Mail a copy.  If dropped by the video visit, the video invitation should be sent to: Text to cell phone: [phone]  Will anyone else be joining your video visit? No          Has had swelling in her legs for years, does not wear compression stockings. Primarily in ankles and goes up towards thigh. No pain, numbness or tingling.  Needs to go back to the dermatologist, history of skin cancer and thinks may be coming back.   "

## 2021-06-12 NOTE — PROGRESS NOTES
"Isamar Little is a 89 y.o. female who is being evaluated via a billable video visit.      The patient has been notified of following:     \"This video visit will be conducted via a call between you and your physician/provider. We have found that certain health care needs can be provided without the need for an in-person physical exam.  This service lets us provide the care you need with a video conversation.  If a prescription is necessary we can send it directly to your pharmacy.  If lab work is needed we can place an order for that and you can then stop by our lab to have the test done at a later time.    Video visits are billed at different rates depending on your insurance coverage. Please reach out to your insurance provider with any questions.    If during the course of the call the physician/provider feels a video visit is not appropriate, you will not be charged for this service.\"    Patient has given verbal consent to a Video visit? Yes  How would you like to obtain your AVS? AVS Preference: Mail a copy.  If dropped by the video visit, the video invitation should be sent to: Text to cell phone: [phone]  Will anyone else be joining your video visit? No, friend Amy present            Margarita Rauschendorfer, LPN      Video-Visit Details    Type of service:  Video Visit    Originating Location (pt. Location): Home    Distant Location (provider location):  [facility]     Platform used for Video Visit: [employer]ne          "

## 2021-06-12 NOTE — PATIENT INSTRUCTIONS - HE
"Swelling in the legs can be caused by many reasons. No matter what the reason, treatment usually includes some type of compression. You should wear your compression socks as much as you can. Your compression should be put on first thing in the morning. Take the compression off at night. It is especially important to wear them with long periods of sitting/standing, long car rides or if you will be flying. Going without compression for even brief periods of time can be damaging to your legs and your health.  Compression socks should get refilled every 4-6 months. They do not need to be worn at night while in bed. We can refill your sock prescription for 1 year otherwise your primary is able to refill them for you. Call us with any problems or questions. If you do a lot of standing, it is good to do calf raises to help keep the blood pumping. If you sit a lot at work, it is good to get up periodically to walk around. Elevation of the foot of your bed 4-6\" helps the blood return back to where it is needed.         Please call us if you have any questions [phone]    Thank you for choosing  [facility].              "

## 2021-06-14 NOTE — TELEPHONE ENCOUNTER
----- Message from LIA Alarcon sent at 1/28/2021  1:40 PM CST -----   General phone call:    Caller: Rene (anthony)  Primary cardiologist: Anju  Detailed reason for call: Pt received a letter that her next device check is due in clinic Pt does not want to come into clinic anymore. Can she do remotes instead  Best phone number: Anthony López H) [phone] C)[phone]  Best time to contact: anytime  Ok to leave a detailed message? Yes  Device? YesMedtronic [id number] [address]vis[address] DR MEEKS  Thank you Tonia     Additional Info:      Call placed to neighbor regarding above concerns. No answer, left detailed message that from Device standpoint we should be able to monitor remotely. Will request orders from MD to change to remotes only. Requested that Amy call me tomorrow so I can discuss and schedule next remote for her.     Irena Ortiz, RN

## 2021-06-14 NOTE — TELEPHONE ENCOUNTER
Noted.  Can you please arrange a remote follow-up appointment me as soon as possible.  I agree with sending the letter.  Let me see if I can have a conversation with her with a remote clinic appointment.  Thank youmdkeegan

## 2021-06-14 NOTE — TELEPHONE ENCOUNTER
"Was able to reach neighbor Amy this morning, discussed that it should be ok to do remotes only for device checks. Will ask Dr. Rene for approval to change to remotes only care.     Amy states yesterday Isamar was \"really down in the dumps yesterday and didn't want to go to doctor's office anymore. However, I think if she is due for a doctor visit you should at least send the letter and we can discuss when the time comes.\"     Will update Dr. Rene regarding patient wishes.        Request for Change in Device Follow-up    Isamarradha Little requests to change their device follow-up. Standard of care for device follow-up is an annual in clinic device check and remote transmissions every three months. Isamar PETRONA Little requests to change their follow-up to Remotes only.     Device Data  Pacemaker  : Medtronic  Model: [id number] Jaspal MEEKS  Implant date: 5/17/2017  Diagnosis: Syncope, SSS    Chart and Device Review  Patient status information: Stable   Device status information: Stable    Is the patient pacing dependent? No  Does the patient currently do routine remote monitoring? Yes  What is the estimated battery longevity? 7.5 years   Are automatic thresholds available and turned on in the device? Yes     Device RN recommendation  Change device follow up to REMOTES ONLY, every 3 months      Routed to physician: Dr. Anju Ortiz, RN              "

## 2021-06-15 PROBLEM — Z95.0 CARDIAC PACEMAKER IN SITU: Status: ACTIVE | Noted: 2017-05-31

## 2021-06-15 PROBLEM — I25.10 ATHEROSCLEROTIC HEART DISEASE OF NATIVE CORONARY ARTERY WITHOUT ANGINA PECTORIS: Status: ACTIVE | Noted: 2017-05-16

## 2021-06-15 NOTE — PATIENT INSTRUCTIONS - HE
It was nice to visit with you today.  I am glad you are feeling well.  We talked about resuming the pravastatin 40 mg daily.  I sent a prescription for the pravastatin as well as renewed your atenolol and nitroglycerin.  We talked about only using nitroglycerin for recurrent chest discomfort and  if you have to use nitroglycerin this would be a reason to call 911.  I would like to have you visit here in the office in approximately 2 months where we can check your cholesterol numbers and check your pacemaker.  Please call with any questions or concerns.  My nurse is Hortensia and her number is [phone].  Please make an appointment to see your primary doctor as well.

## 2021-06-16 PROBLEM — I50.32 CHRONIC DIASTOLIC CONGESTIVE HEART FAILURE (H): Status: ACTIVE | Noted: 2018-06-28

## 2021-06-16 PROBLEM — M81.0 OSTEOPOROSIS WITHOUT CURRENT PATHOLOGICAL FRACTURE, UNSPECIFIED OSTEOPOROSIS TYPE: Status: ACTIVE | Noted: 2020-07-13

## 2021-06-16 PROBLEM — M79.89 LEG SWELLING: Status: ACTIVE | Noted: 2020-08-12

## 2021-06-16 PROBLEM — M19.91 PRIMARY OSTEOARTHRITIS: Status: ACTIVE | Noted: 2020-07-13

## 2021-06-16 PROBLEM — R60.9 CHRONIC EDEMA: Status: ACTIVE | Noted: 2020-07-13

## 2021-06-16 PROBLEM — F51.01 PRIMARY INSOMNIA: Status: ACTIVE | Noted: 2020-07-13

## 2021-06-16 PROBLEM — K21.9 GASTROESOPHAGEAL REFLUX DISEASE: Status: ACTIVE | Noted: 2020-07-13

## 2021-06-16 PROBLEM — R54 ADVANCED AGE: Status: ACTIVE | Noted: 2020-08-12

## 2021-06-16 PROBLEM — I87.303 VENOUS HYPERTENSION OF LOWER EXTREMITY, BILATERAL: Status: ACTIVE | Noted: 2020-08-12

## 2021-06-16 PROBLEM — S02.5XXD CLOSED FRACTURE OF TOOTH WITH ROUTINE HEALING: Status: ACTIVE | Noted: 2019-04-23

## 2021-06-16 PROBLEM — I89.0 ACQUIRED LYMPHEDEMA OF LEG: Status: ACTIVE | Noted: 2020-08-12

## 2021-06-16 PROBLEM — I87.2 VENOUS INSUFFICIENCY OF BOTH LOWER EXTREMITIES: Status: ACTIVE | Noted: 2020-08-12

## 2021-06-16 NOTE — TELEPHONE ENCOUNTER
Med list updated.      Pt also updated - arm pain happens when she reaches back, like to unhook her bra and it is gone in 30 seconds.  Pt decided against nuclear stress test, appt cancelled.    Dr Rene updated.  -harini

## 2021-06-16 NOTE — PATIENT INSTRUCTIONS - HE
PLease call my nurse Hortensia at [phone] to update your medication list.We are going to plan a non exercise chemical stress test and blood work in the near future.Please call if arm discomfort is getting worse and if you have to use nitroglycerin consider calling 911.

## 2021-06-18 NOTE — LETTER
Letter by Margi Marcial at      Author: Margi Marcial Service: -- Author Type: --    Filed:  Encounter Date: 1/2/2019 Status: (Other)       Isamar Little  [address]  [place] [address]      January 3, 2019      Dear Ms. Little,    RE: Remote Results    We are writing to you regarding your recent Remote Pacemaker check from home. Your transmission was received successfully. Battery status is satisfactory at this time.     Your results are within normal limits.    Your next device appointment will be a remote check on April 8, 2019.  You can choose the time of day you wish to transmit.    To schedule or reschedule, please call [phone] and press 1.    NOTE: If you would like to do an extra transmission, please call [phone] and press 3 to speak to a nurse BEFORE transmitting. This ensures that the Device Clinic staff is aware of the reason you are sending a transmission, and can follow-up with you after it has been reviewed.    We will be checking your implanted device from home (remotely) every three months unless otherwise instructed. We will need to see you in the clinic at least once a year. You may need to be seen in the clinic sooner depending on the results of your check.    Please be aware:    The follow-up schedule is like a Physician prescription.    Your remote monitor is paired to your specific implanted device.      Sincerely,    [facility]

## 2021-06-19 NOTE — PROGRESS NOTES
Patient seen in clinic for HF education.  Reviewed HF Binder that includes the  HF Sx Awareness & Action plan  handout and  A Stronger Pump  booklet and Weight log booklet highlighting :  __X_patient s type of heart failure _X__Na management in diet  __X_importance of daily wts  _X__Fluid Guidelines, if applicable  __X_medication review and importance of compliance     Instructed patient in signs and sx of heart failure, reiterated when to call clinic - reviewed HF hotline # [phone] and after hours call # [phone].  Majority of time was spent reviewing: s/s CHF exacerbation, low sodium diet options and shopping guide.   Patient verbalized understanding of HF discussion.  Plan for f/u with continued HF education reviewed.  F/u in 2 weeks with NP. Will continue to reinforce HF management education.

## 2021-06-19 NOTE — PROGRESS NOTES
Med list updated.  -[facility]    Notes Recorded by Oscar Woo NP on 7/26/2018 at 8:24 AM  Hortensia Ford,  Her renal function is stable. Let's have her cut back on Furosemide from 40 mg to 20 mg daily. Encourage her to call back if persistent weight gain with worsening shortness of breath and LE edema.  Thank you  Oscar

## 2021-06-19 NOTE — LETTER
Letter by Margi Marcial at      Author: Margi Marcial Service: -- Author Type: --    Filed:  Encounter Date: 4/8/2019 Status: (Other)         Isamar Little  [address]  [facility] [address]      April 8, 2019      Dear Ms. Little,    RE: Remote Results    We are writing to you regarding your recent Remote Pacemaker check from home. Your transmission was received successfully. Battery status is satisfactory at this time.     Your results are within normal limits.    Your next device appointment will be a clinic visit.  Please call in May to schedule.      To schedule or reschedule, please call [phone] and press 1.    NOTE: If you would like to do an extra transmission, please call [phone] and press 3 to speak to a nurse BEFORE transmitting. This ensures that the Device Clinic staff is aware of the reason you are sending a transmission, and can follow-up with you after it has been reviewed.    We will be checking your implanted device from home (remotely) every three months unless otherwise instructed. We will need to see you in the clinic at least once a year. You may need to be seen in the clinic sooner depending on the results of your check.    Please be aware:    The follow-up schedule is like a Physician prescription.    Your remote monitor is paired to your specific implanted device.      Sincerely,    [facility]

## 2021-06-19 NOTE — LETTER
Letter by Margi Marcial at      Author: Margi Marcial Service: -- Author Type: --    Filed:  Encounter Date: 10/23/2019 Status: Signed         Isamar Little  [address]  [facility] [address]      October 23, 2019      Dear MsJimi Hareon,    RE: Remote Results    We are writing to you regarding your recent Remote Pacemaker check from home. Your transmission was received successfully. Battery status is satisfactory at this time.     Your results are showing no significant changes.    Your next device appointment will be a remote check on January 27, 2020.  You can choose the time of day you wish to transmit.    To schedule or reschedule, please call [phone] and press 1.    NOTE: If you would like to do an extra transmission, please call [phone] and press 3 to speak to a nurse BEFORE transmitting. This ensures that the Device Clinic staff is aware of the reason you are sending a transmission, and can follow-up with you after it has been reviewed.    We will be checking your implanted device from home (remotely) every three months unless otherwise instructed. We will need to see you in the clinic at least once a year. You may need to be seen in the clinic sooner depending on the results of your check.    Please be aware:    The follow-up schedule is like a Physician prescription.    Your remote monitor is paired to your specific implanted device.      Sincerely,    [facility]

## 2021-06-20 NOTE — LETTER
Letter by Angelica Gaspar RN at      Author: Angelica Gaspar RN Service: -- Author Type: --    Filed:  Encounter Date: 8/25/2020 Status: (Other)         Isamar Little  [address]  [facility] [address]      August 26, 2020      Dear Ms. Hareon,    RE: Remote Results    We are writing to you regarding your recent Remote Pacemaker check from home. Your transmission was received successfully. Battery status is satisfactory at this time.     Your results are within normal limits.    Your next device appointment will be a remote check on 11/30/2020.  You can choose the time of day you wish to transmit.    To schedule or reschedule, please call [phone] and press 1.    NOTE: If you would like to do an extra transmission, please call [phone] and press 3 to speak to a nurse BEFORE transmitting. This ensures that the Device Clinic staff is aware of the reason you are sending a transmission, and can follow-up with you after it has been reviewed.    We will be checking your implanted device from home (remotely) every three months unless otherwise instructed. We will need to see you in the clinic at least once a year. You may need to be seen in the clinic sooner depending on the results of your check.    Please be aware:    The follow-up schedule is like a Physician prescription.    Your remote monitor is paired to your specific implanted device.      Sincerely,    [facility]

## 2021-06-20 NOTE — PROGRESS NOTES
Repeat labs ordered.   -[facility]    Notes Recorded by Rufino Rene MD on 10/1/2018 at 10:08 PM  ldl is above goal, she reported not taking the pravastatin regularly, she was going to cut the pill in half, lets repeat lipids in 2 months  mdg

## 2021-06-20 NOTE — LETTER
Letter by Angelica Gaspar RN at      Author: Angelica Gaspar RN Service: -- Author Type: --    Filed:  Encounter Date: 4/29/2020 Status: (Other)         Isamar Little  [address]  [facility] [address]      April 30, 2020      Dear Ms. Little,    RE: Remote Results    We are writing to you regarding your recent Remote Pacemaker check from home. Your transmission was received successfully. Battery status is satisfactory at this time.     Your results are within normal limits.    Your next device appointment will be a remote check on 8/4/2020.  You can choose the time of day you wish to transmit.    To schedule or reschedule, please call [phone] and press 1.    NOTE: If you would like to do an extra transmission, please call [phone] and press 3 to speak to a nurse BEFORE transmitting. This ensures that the Device Clinic staff is aware of the reason you are sending a transmission, and can follow-up with you after it has been reviewed.    We will be checking your implanted device from home (remotely) every three months unless otherwise instructed. We will need to see you in the clinic at least once a year. You may need to be seen in the clinic sooner depending on the results of your check.    Please be aware:    The follow-up schedule is like a Physician prescription.    Your remote monitor is paired to your specific implanted device.      Sincerely,    [facility]

## 2021-06-20 NOTE — LETTER
Letter by Margi Marcial at      Author: Margi Marcial Service: -- Author Type: --    Filed:  Encounter Date: 1/27/2020 Status: (Other)         Isamar Little  [address]  [facility] [address]      January 27, 2020      Dear Ms. Little,    RE: Remote Results    We are writing to you regarding your recent Remote Pacemaker check from home. Your transmission was received successfully. Battery status is satisfactory at this time.     Your results are showing no significant changes.    Your next device appointment will be a remote check on April 27, 2020.  You can choose the time of day you wish to transmit.    To schedule or reschedule, please call [phone] and press 1.    NOTE: If you would like to do an extra transmission, please call [phone] and press 3 to speak to a nurse BEFORE transmitting. This ensures that the Device Clinic staff is aware of the reason you are sending a transmission, and can follow-up with you after it has been reviewed.    We will be checking your implanted device from home (remotely) every three months unless otherwise instructed. We will need to see you in the clinic at least once a year. You may need to be seen in the clinic sooner depending on the results of your check.    Please be aware:    The follow-up schedule is like a Physician prescription.    Your remote monitor is paired to your specific implanted device.      Sincerely,    [facility]

## 2021-06-20 NOTE — LETTER
Letter by Rufino Rene MD at      Author: Rufino Rene MD Service: -- Author Type: --    Filed:  Encounter Date: 5/26/2020 Status: (Other)         Isamar Little  [address]  [place] [address]      May 26, 2020      Dear Isamar,    This letter is to remind you that you will be due for your follow up appointment with Dr. Rufino Rene in June, 2020 . To help ensure you are in the best health possible, a regular follow-up with your cardiologist is essential.     Please call our Patient Scheduling Line at [phone] to schedule your appointment at your earliest convenience.  If you have recently scheduled an appointment, please disregard this letter.    We look forward to seeing you again. As always, we are available at the number  above for any questions or concerns you may have.      Sincerely,     [facility]

## 2021-06-21 NOTE — LETTER
Letter by Mary Jane Hercules RDCS at      Author: Mary Jane Herucles RDCS Service: -- Author Type: --    Filed:  Encounter Date: 11/30/2020 Status: (Other)         Isamar Little  [address]  [facility] [address]      November 30, 2020      Dear Ms. Hareon,    RE: Remote Results    We are writing to you regarding your recent Remote Pacemaker check from home. Your transmission was received successfully. Battery status is satisfactory at this time.     Your results are showing no significant changes.    Your next device appointment will be a clinic visit.  Please call in late December to schedule.      To schedule or reschedule, please call [phone] and press 1.    NOTE: If you would like to do an extra transmission, please call [phone] and press 3 to speak to a nurse BEFORE transmitting. This ensures that the Device Clinic staff is aware of the reason you are sending a transmission, and can follow-up with you after it has been reviewed.    We will be checking your implanted device from home (remotely) every three months unless otherwise instructed. We will need to see you in the clinic at least once a year. You may need to be seen in the clinic sooner depending on the results of your check.    Please be aware:    The follow-up schedule is like a Physician prescription.    Your remote monitor is paired to your specific implanted device.      Sincerely,    [facility]

## 2021-06-21 NOTE — LETTER
Letter by Alanis Flores RN at      Author: Alanis Flores RN Service: -- Author Type: --    Filed:  Encounter Date: 3/5/2021 Status: (Other)         Isamar Little  [address]  [facility] [address]      March 5, 2021      Dear MsJimi Sidney,    RE: Remote Results    We are writing to you regarding your recent Remote ICD check from home. Your transmission was received successfully. Battery status is satisfactory at this time.     Your results are showing no significant changes.    Your next device appointment will be a remote check on 6/7/2021.  You can choose the time of day you wish to transmit.    To schedule or reschedule, please call [phone] and press 1.    NOTE: If you would like to do an extra transmission, please call [phone] and press 3 to speak to a nurse BEFORE transmitting. This ensures that the Device Clinic staff is aware of the reason you are sending a transmission, and can follow-up with you after it has been reviewed.    We will be checking your implanted device from home (remotely) every three months unless otherwise instructed. We will need to see you in the clinic at least once a year. You may need to be seen in the clinic sooner depending on the results of your check.    Please be aware:    The follow-up schedule is like a Physician prescription.    Your remote monitor is paired to your specific implanted device.      Sincerely,    [facility]        DISPLAY PLAN FREE TEXT DISPLAY PLAN FREE TEXT

## 2021-06-25 NOTE — PROGRESS NOTES
Progress Notes by Rufino Rene MD at 10/3/2017  1:50 PM     Author: Rufino Rene MD Service: -- Author Type: Physician    Filed: 10/4/2017  1:49 PM Encounter Date: 10/3/2017 Status: Signed    : Rufino Rene MD (Physician)       [facility] Progress Note    Assessment:  1.  Episodic falling spells which was felt to be potentially syncopal episodes which have resolved over the last 5 months with the pacemaker implantation.  She has had no significant burden of atrial fibrillation on device monitor and will continue to monitor on the device follow-up.  She is currently on aspirin daily.    2.  Hyperlipidemia.  She is on pravastatin.  I suggested that she make an appointment to follow-up with her primary care physician and would consider follow-up chemistries and liver function tests and lipid panel at that time.  I would appreciate a copy for my records and review.    3.  Hypertension.  Blood pressure appears to be under reasonable control.    4.  Coronary artery disease with recent stress test as described and no reported anginal chest discomfort.        Plan: I will up 4 months with plan as described above.    1. Syncope, cardiogenic     2. Paroxysmal atrial fibrillation     3. Coronary artery disease due to lipid rich plaque           An After Visit Summary was printed and given to the patient.    Subjective:    Isamar Little is a 89 y.o. female who returned for a planned  follow up visit.  We met May 2017 with episodic falling spells consistent with brief syncope based on her description.  She would have no prewarning.  She had previously followed with a cardiologist at [facility].  She has a history of coronary artery disease with coronary intervention in 2010 to a diagonal branch with reported 100% stenosis of the left anterior descending.  He subsequently underwent pacemaker implantation as well as stress testing which is outlined below.  She had been on sotalol for  paroxysmal atrial fibrillation which was discontinued.  She tells me that since the pacemaker she has had no additional falling events or syncope or near syncope.  He does get mildly lightheaded if she bends over at her waist.  She denies any complaints of chest pain orthopnea PND or significant shortness of breath.  She climbs stairs regularly.  Device check August 2017 demonstrated stable device function.    Review of Systems:   General: Weight Gain  Eyes: WNL  Ears/Nose/Throat: WNL  Lungs: WNL  Heart: WNL  Stomach: WNL  Bladder: Frequent Urination at Night  Muscle/Joints: Joint Pain  Skin: WNL  Nervous System: WNL  Mental Health: WNL     Blood: WNL      Problem List:    Patient Active Problem List   Diagnosis   ? Coronary artery disease involving native heart without angina pectoris, unspecified vessel or lesion type   ? Essential hypertension with goal blood pressure less than 140/90   ? Anxiety state, unspecified   ? Other and unspecified hyperlipidemia   ? Bereavement, uncomplicated   ? Other malaise and fatigue   ? Edema   ? Depressive disorder, not elsewhere classified   ? Skin cancer   ? Syncope   ? Syncope, cardiogenic   ? Chest pain at rest   ? Coronary artery disease due to lipid rich plaque   ? Paroxysmal atrial fibrillation   ? SSS (sick sinus syndrome)   ? Cardiac pacemaker, dual, in situ       Social History     Social History   ? Marital status:      Spouse name: N/A   ? Number of children: N/A   ? Years of education: N/A     Occupational History   ? Not on file.     Social History Main Topics   ? Smoking status: Never Smoker   ? Smokeless tobacco: Never Used   ? Alcohol use No   ? Drug use: No   ? Sexual activity: Not on file     Other Topics Concern   ? Not on file     Social History Narrative       Family History   Problem Relation Age of Onset   ? Hypertension Mother    ? Hypertension Father    ? Stroke Father    ? Kidney disease Maternal Grandmother    ? Kidney disease Maternal  "Grandfather    ? Early death Maternal Grandfather    ? Heart disease Maternal Grandfather    ? Kidney disease Paternal Grandmother    ? Kidney disease Paternal Grandfather    ? Early death Paternal Grandfather    ? Heart disease Paternal Grandfather        Current Outpatient Prescriptions   Medication Sig Dispense Refill   ? acetaminophen (TYLENOL) 500 MG tablet Take 500-1,000 mg by mouth every 6 (six) hours as needed for pain.      ? aspirin 81 MG EC tablet Take 81 mg by mouth at bedtime.      ? atenolol (TENORMIN) 25 MG tablet Take 1 tablet (25 mg total) by mouth daily. 30 tablet 3   ? BIOTIN ORAL Take 1 capsule by mouth daily.     ? calcium-vitamin D (CALCIUM-VITAMIN D) 500 mg(1,250mg) -200 unit per tablet Take 1 tablet by mouth daily.     ? isosorbide mononitrate (IMDUR) 30 MG 24 hr tablet Take 15 mg by mouth daily.      ? multivitamin therapeutic (THERAGRAN) tablet Take 1 tablet by mouth daily.     ? OMEGA-3/DHA/EPA/FISH OIL (FISH OIL-OMEGA-3 FATTY ACIDS) 300-1,000 mg capsule Take 2 g by mouth daily.     ? omeprazole (PRILOSEC) 20 MG capsule Take 20 mg by mouth daily as needed. Take when using NSAIDS (ibuprofen, naproxen, etc)     ? PRAMOXINE/MENTHOL/PETROLATUM (SARNA ULTRA TOP) Apply topically 2 (two) times a day.     ? pravastatin (PRAVACHOL) 40 MG tablet Take 40 mg by mouth bedtime.     ? sertraline (ZOLOFT) 50 MG tablet Take 50 mg by mouth daily.      ? spironolactone-hydrochlorothiazide (ALDACTAZIDE) 25-25 mg tablet Take 1 tablet by mouth daily.      ? vitamin A-vitamin C-vit E-min (OCUVITE) Tab tablet Take 1 tablet by mouth daily.     ? naproxen sodium (ALEVE) 220 MG tablet Take 220 mg by mouth every 12 (twelve) hours as needed for pain.        No current facility-administered medications for this visit.        Objective:     /68 (Patient Site: Left Arm, Patient Position: Sitting, Cuff Size: Adult Large)  Pulse 72  Resp 20  Ht 4' 11.75\" (1.518 m) Comment: shoes on  Wt 151 lb (68.5 kg) Comment: " shoes on  BMI 29.74 kg/m2  151 lb (68.5 kg)       Physical Exam:    GENERAL APPEARANCE: alert, no apparent distress  HEENT: no scleral icterus or xanthelasma  NECK: jugular venous pressure within normal limits  CHEST: symmetric, the lungs are clear to auscultation  CARDIOVASCULAR: regular rhythm with systolic murmur, S4; no carotid bruits  Abdomen: No Organomegaly, masses, bruits, or tenderness. Bowels sounds are present      EXTREMITIES: no cyanosis, clubbing, trace edema    Cardiac Testing:    Patient Name MRN Sex              Age     Isamar Little [id number] Female 1928 (89 y.o.)       Indications      Syncope       Summary        1.Left ventricle ejection fraction is normal. The calculated left ventricular ejection fraction is 63%.    2.Mild to moderate tricuspid and mild to moderate mitral regurgitation.    3.When compared to the report of the previous study dated 2008, there are changes noted. Images unavailable but apparently mild to moderate tricuspid and mitral regurgitations are new as is distal septal wall motion abnormality     EKG Scan       Scan on: 17 10:38 AM by:       Conclusion        The pharmacologic nuclear stress test is abnormal.    There is a medium sized area of infarct in the anterior, apical and anteroseptal segment(s) of the left ventricle. No evidence of Lexiscan induced ischemia.    The left ventricular ejection fraction is 63%.    The findings of this examination were communicated to Dr. Rene.    There is no prior study available.             Lab Results:    Lab Results   Component Value Date     08/15/2017    K 4.1 08/15/2017     08/15/2017    CO2 24 08/15/2017    BUN 22 08/15/2017    CREATININE 0.83 08/15/2017    CALCIUM 9.2 08/15/2017     Lab Results   Component Value Date    CHOL 167 10/11/2010    TRIG 92 10/11/2010    HDL 55 10/11/2010     BNP (pg/mL)   Date Value   2017 63   10/15/2013 86   2010 118     Creatinine (mg/dL)   Date  Value   08/15/2017 0.83   05/16/2017 0.77   05/15/2017 0.80   02/08/2017 0.76     LDL Calculated (mg/dL)   Date Value   10/11/2010 93     Lab Results   Component Value Date    WBC 6.8 05/15/2017    HGB 14.0 05/15/2017    HCT 41.2 05/15/2017    MCV 95 05/15/2017     05/16/2017               This note has been dictated using voice recognition software. Any grammatical or context distortions are unintentional and inherent to the software.      Rufino Rene M.D., F.A.C..  [facility]  [phone]

## 2021-06-26 NOTE — PROGRESS NOTES
Progress Notes by Rufino Rene MD at 6/21/2018  3:50 PM     Author: Rufino Rene MD Service: -- Author Type: Physician    Filed: 6/21/2018  4:18 PM Encounter Date: 6/21/2018 Status: Signed    : Rufino Rene MD (Physician)       [facility] Progress Note    Assessment:  1.  Episodic falling spells which sounded consistent with syncope.  This has not reoccurred since pacemaker implantation 1 year ago.  Device interrogation today demonstrates stable device function.  He has had no significant episodes of atrial fibrillation.    2.  Hypertension.  Blood pressure appears to be under reasonable control.  It appears that she has self discontinued a number of her medications.  Is no longer taking pravastatin.  Reviewed his medication list included Aldactazide which she is not taking.  She does remain on atenolol 25 mg daily.    3.  Lower extremity edema.  This is chronic per her report but is more bothersome.  She does have some venous stasis changes.  She has difficulty utilizing support stockings.  She does keep her legs elevated.  She does however need a higher salt content diet which includes a TV dinners.  I reviewed the importance of keeping her salt in her diet under control.  I elected to check some laboratory studies today and if stable would recommend a trial of low-dose of furosemide.  Distends that this would require follow-up and additional laboratory studies.        Plan: 1.  As outlined above.  Patient has made decisions not to be on pravastatin appears to have discontinued Aldactazide.  Talked about the potential for low-dose furosemide for her lower extremity edema and keeping her legs elevated and watching the salt in her diet.    1. Chronic diastolic congestive heart failure (H)  Basic metabolic panel    BNP(B-type Natriuretic Peptide)    Magnesium   2. Coronary artery disease involving native heart without angina pectoris, unspecified vessel or lesion type     3.  Essential hypertension with goal blood pressure less than 140/90     4. SSS (sick sinus syndrome) (H)           An After Visit Summary was printed and given to the patient.    Subjective:    Isamar Little is a 89 y.o. female who returned for a planned  follow up visit.  She reports no specific complaints of dizziness or lightheadedness.  She was initially seen in consultation secondary to episodic falling events that sounded syncopal in nature.  This has not reoccurred since the pacemaker was placed in May 2017.  She previously followed with another cardiologist.  She has a history of coronary artery disease with coronary intervention to a diagonal branch and circumflex known 100% stenosis of the LAD.  Nuclear stress test completed during the admission 1 year ago demonstrated in size infarction in the anterior apical distribution with normal ejection fraction.    She has had chronic lower extremity edema.  Right leg greater than left.  It appears that she has been on Aldactone and hydrochlorothiazide in the past but this appears to have been discontinued.  She also self discontinued her pravastatin.  He does not describe chest pain or significant shortness of breath.  She does not describe orthopnea or PND.    Review of Systems:   General: WNL  Eyes: Visual Distubance  Ears/Nose/Throat: Hearing Loss  Lungs: Shortness of Breath  Heart: Shortness of Breath with activity  Stomach: WNL  Bladder: WNL  Muscle/Joints: Muscle Pain  Skin: Rash  Nervous System: WNL  Mental Health: WNL     Blood: Easy Bruising      Problem List:    Patient Active Problem List   Diagnosis   ? Coronary artery disease involving native heart without angina pectoris, unspecified vessel or lesion type   ? Essential hypertension with goal blood pressure less than 140/90   ? Anxiety state, unspecified   ? Other and unspecified hyperlipidemia   ? Bereavement, uncomplicated   ? Other malaise and fatigue   ? Edema   ? Depressive disorder, not  elsewhere classified   ? Skin cancer   ? Syncope   ? Syncope, cardiogenic   ? Chest pain at rest   ? Coronary artery disease due to lipid rich plaque   ? Paroxysmal atrial fibrillation (H)   ? SSS (sick sinus syndrome) (H)   ? Cardiac pacemaker, dual, in situ       Social History     Social History   ? Marital status:      Spouse name: N/A   ? Number of children: N/A   ? Years of education: N/A     Occupational History   ? Not on file.     Social History Main Topics   ? Smoking status: Never Smoker   ? Smokeless tobacco: Never Used   ? Alcohol use No   ? Drug use: No   ? Sexual activity: Not on file     Other Topics Concern   ? Not on file     Social History Narrative       Family History   Problem Relation Age of Onset   ? Hypertension Mother    ? Hypertension Father    ? Stroke Father    ? Kidney disease Maternal Grandmother    ? Kidney disease Maternal Grandfather    ? Early death Maternal Grandfather    ? Heart disease Maternal Grandfather    ? Kidney disease Paternal Grandmother    ? Kidney disease Paternal Grandfather    ? Early death Paternal Grandfather    ? Heart disease Paternal Grandfather        Current Outpatient Prescriptions   Medication Sig Dispense Refill   ? acetaminophen (TYLENOL) 500 MG tablet Take 500-1,000 mg by mouth every 6 (six) hours as needed for pain.      ? aspirin 81 MG EC tablet Take 81 mg by mouth at bedtime.      ? atenolol (TENORMIN) 25 MG tablet Take 1 tablet (25 mg total) by mouth daily. 30 tablet 3   ? BIOTIN ORAL Take 1 capsule by mouth daily.     ? calcium-vitamin D (CALCIUM-VITAMIN D) 500 mg(1,250mg) -200 unit per tablet Take 1 tablet by mouth daily.     ? isosorbide mononitrate (IMDUR) 30 MG 24 hr tablet Take 0.5 tablets (15 mg total) by mouth daily. 45 tablet 1   ? multivitamin therapeutic (THERAGRAN) tablet Take 1 tablet by mouth daily.     ? OMEGA-3/DHA/EPA/FISH OIL (FISH OIL-OMEGA-3 FATTY ACIDS) 300-1,000 mg capsule Take 2 g by mouth daily.     ?  "PRAMOXINE/MENTHOL/PETROLATUM (SARNA ULTRA TOP) Apply topically 2 (two) times a day.     ? sertraline (ZOLOFT) 50 MG tablet Take 50 mg by mouth daily.      ? vitamin A-vitamin C-vit E-min (OCUVITE) Tab tablet Take 1 tablet by mouth daily.     ? naproxen sodium (ALEVE) 220 MG tablet Take 220 mg by mouth every 12 (twelve) hours as needed for pain.      ? omeprazole (PRILOSEC) 20 MG capsule Take 20 mg by mouth daily as needed. Take when using NSAIDS (ibuprofen, naproxen, etc)     ? pravastatin (PRAVACHOL) 40 MG tablet Take 40 mg by mouth bedtime.     ? spironolactone-hydrochlorothiazide (ALDACTAZIDE) 25-25 mg tablet Take 1 tablet by mouth daily.        No current facility-administered medications for this visit.        Objective:     /60 (Patient Site: Left Arm, Patient Position: Sitting, Cuff Size: Adult Regular)  Pulse 67  Resp 12  Ht 4' 11\" (1.499 m)  Wt 148 lb (67.1 kg)  BMI 29.89 kg/m2  148 lb (67.1 kg)       Physical Exam:    GENERAL APPEARANCE: alert, no apparent distress  HEENT: no scleral icterus or xanthelasma  NECK: jugular venous pressure does not appear elevated.  CHEST: symmetric, the lungs are clear to auscultation, pacemaker site well-healed  CARDIOVASCULAR: regular rhythm with soft systolic murmur; no carotid bruits  Abdomen: No Organomegaly, masses, bruits, or tenderness. Bowels sounds are present      EXTREMITIES: no cyanosis, clubbing, 1-2+ edema of the right lower extremity 1+ edema of the left lower extremity.  Stasis changes with mild skin breakdown.    Cardiac Testing:  Syncope       Summary        1.Left ventricle ejection fraction is normal. The calculated left ventricular ejection fraction is 63%.    2.Mild to moderate tricuspid and mild to moderate mitral regurgitation.    3.When compared to the report of the previous study dated 7/1/2008, there are changes noted. Images unavailable but apparently mild to moderate tricuspid and mitral regurgitations are new as is distal septal wall " motion abnormality     Patient Information      Patient Name MRN Sex              Age     Isamar Little [id number] Female 1928 (89 y.o.)       EKG Scan       Scan on: 17 10:38 AM by:       Conclusion        The pharmacologic nuclear stress test is abnormal.    There is a medium sized area of infarct in the anterior, apical and anteroseptal segment(s) of the left ventricle. No evidence of Lexiscan induced ischemia.    The left ventricular ejection fraction is 63%.    The findings of this examination were communicated to Dr. Rene.    There is no prior study available.                 Lab Results:    Lab Results   Component Value Date     10/16/2017    K 3.9 10/16/2017     10/16/2017    CO2 20 (L) 10/16/2017    BUN 13 10/16/2017    CREATININE 0.75 10/16/2017    CALCIUM 9.3 10/16/2017     Lab Results   Component Value Date    CHOL 182 10/16/2017    TRIG 102 10/16/2017    HDL 47 (L) 10/16/2017     BNP (pg/mL)   Date Value   2017 63   10/15/2013 86   2010 118     Creatinine (mg/dL)   Date Value   10/16/2017 0.75   08/15/2017 0.83   2017 0.77   05/15/2017 0.80     LDL Calculated (mg/dL)   Date Value   10/16/2017 115   10/11/2010 93     Lab Results   Component Value Date    WBC 6.8 05/15/2017    HGB 14.0 05/15/2017    HCT 41.2 05/15/2017    MCV 95 05/15/2017     2017               This note has been dictated using voice recognition software. Any grammatical or context distortions are unintentional and inherent to the software.      Rufino Rene M.D., F.A.C.C.  [facility]  [phone]

## 2021-06-26 NOTE — PROGRESS NOTES
Progress Notes by Oscar Woo NP at 6/28/2018 10:30 AM     Author: Oscar Woo NP Service: -- Author Type: Nurse Practitioner    Filed: 6/28/2018 11:43 AM Encounter Date: 6/28/2018 Status: Signed    : Oscar Woo NP (Nurse Practitioner)           Click to link to [facility] Heart Care     [facility] HEART CARE NOTE      Assessment/Recommendations   Assessment:    1. Chronic  diastolic dysfunction, NYHA class III: Not compensated. She gets shortness of breath with exertions especially long distance and with flight of stairs with some chest palpitation. She has chronic LE edema. Reports occasional abdominal bloating, poor appetite and fatigue.  She reports her most recent weight is around 143 pounds about 2 weeks ago at home.  She has been drinking more than 64 ounces of fluid per day.  She does watch her sodium intake although she mostly eats prepackaged food.     Patient saw Dr. Rene recently - her BNP was 99.  She was started on low-dose of furosemide.  Patient has not noticed change in her lower extremity edema or other heart failure symptoms.    2. Essential hypertension: Blood pressure today is 110/70 and heart rate 64.  Currently on atenolol 25 mg daily and furosemide 20 mg daily.    3.  History of syncope, sick sinus syndrome with status post pacemaker: device check on 6/21/2018 showed: Normal device function with no significant arrhythmia noted.  Rate response activity threshold was changed to low from medium to low due to patient complains of some activity intolerance.    4.  Coronary artery disease with status post PCI to diagonal and circumflex in 100% lesion in LAD 4/2011: Denies any chest pain although she has shortness of breath on mild exertion.  She is currently on aspirin 81 mg daily, atenolol 25 mg daily and isosorbide mononitrate 15 mg daily.  Most recent stress test in May 2017 showed medium-sized area of infarct in the anterior, apical and anteroseptal segment of the LV but no  evidence of inducible ischemia.    Plan:  1.  Her symptoms of shortness of breath and exertion, fatigue, abdominal bloating and lower extremity edema is consistent with volume overload.  This could be most likely due to excessive fluid intake and salt indiscretion in her diet.  We reviewed and discussed about the heart failure, medication management, lifestyle management including low-sodium diet, regular physical exercise and limiting fluid intake.  She met with heart failure nurse clinician for heart failure education.     Heart failure medications:  - Diuretic therapy with furosemide 20 mg daily. Will increase dose if BMP stable.  -Most recent BMP stable on 6/21/2018  -Repeat BMP and magnesium pending  -Limit salt intake to less than 2 g per day  -Limit fluid intake to 1.5 L or 50 ounces per day    2.  Continue same regimen for blood pressure.  Recommended DASH diet    3.  Continue aspirin, atenolol and isosorbide mononitrate.  Recommended heart healthy diet.    Follow up with Oscar in 2 weeks in HF clinic.     History of Present Illness    Ms. Isamar Little is a 89 y.o. female with a significant past medical history of coronary disease with status post PCI to diagonal and circumflex and known 100% LAD stenosis, syncope, sick sinus syndrome with status post pacemaker, chronic lower extremity edema, and chronic diastolic heart failure who is seen at [facility] heart failure clinic today for continued follow-up per Dr. Rene.  Patient was recently seen by Dr. Rene on 6/21/2018 and was noted  patient being concerned about her lower extremity edema.  She always had chronic lower extremity edema with right side greater than left side.  Her recent BNP level is 99.  She was started on a low dose of furosemide.  Her most recent echocardiogram done in May 2017 showed an EF of 63% with grade 1 mild diastolic dysfunction, mild to moderate tricuspid and mild to moderate mitral regurgitation.  Most recent stress  test in May 2017 showed medium-sized area of infarct in the anterior, apical and anteroseptal segment of the LV but no evidence of inducible ischemia.    Today, patient presented to the heart failure clinic accompanied by her friend.  She reports no change in her lower extremity edema since started on furosemide.  She also reports having shortness of breath on exertion especially walking in the hallway or taking flight of stairs.  She also reports fatigue, abdominal bloating and occasional chest palpitation with exertion.  Her recent device check showed stable device function with no significant arrhythmia noted.  Her rate response threshold was changed to low from medium to low due to patient complaint of physical activity intolerance. She denies lightheadedness, shortness of breath, orthopnea, PND, palpitations and chest pain.      She reports her last weight checked about 2 weeks ago at home with 143 pounds.  She mostly eats prepackaged food but she does not add any extra salt.  She reports drinking more than 64 ounces of blood per day.  She does not exercise on a regular basis and it has been mostly sedentary due to her shortness of breath.    ECHO :      Physical Examination Review of Systems   Vitals:    06/28/18 1035   BP: 110/70   Pulse: 64   Resp: 18     Body mass index is 29.69 kg/(m^2).  Wt Readings from Last 3 Encounters:   06/28/18 147 lb (66.7 kg)   06/21/18 148 lb (67.1 kg)   10/03/17 151 lb (68.5 kg)       General Appearance:     Alert, cooperative and in no acute distress.   ENT/Mouth: membranes moist, no oral lesions or bleeding gums.      EYES:  no scleral icterus, normal conjunctivae   Neck: no carotid bruits or thyromegaly   Chest/Lungs:   lungs are clear to auscultation, no rales or wheezing, respirations unlabored except diminished in bases   Cardiovascular:   Normal first and second heart sounds with no murmurs, rubs, or gallops; the carotid, radial and posterior tibial pulses are intact,  Jugular venous pressure flat with no HJR, 1-2+  edema bilateral lower extremities (Rti>let)   Abdomen:  Large but soft, nontender, nondistended, bowel sounds present   Extremities: no cyanosis or clubbing   Skin: warm, dry.    Neurologic: mood and affect are appropriate, alert and oriented x3      General: Weight Gain  Eyes: WNL  Ears/Nose/Throat: Hearing Loss  Lungs: WNL  Heart: Leg Swelling  Stomach: WNL  Bladder: WNL  Muscle/Joints: Joint Pain  Skin: Rash  Nervous System: WNL  Mental Health: WNL     Blood: Easy Bruising     Medical History  Surgical History Family History Social History   Past Medical History:   Diagnosis Date   ? Anxiety state, unspecified    ? Bereavement, uncomplicated    ? Coronary atherosclerosis of native coronary artery    ? Depressive disorder, not elsewhere classified    ? Edema    ? Essential hypertension, benign    ? Other and unspecified hyperlipidemia    ? Other malaise and fatigue    ? Skin cancer     Past Surgical History:   Procedure Laterality Date   ? ANGIOPLASTY  6/2008   ? CAPSULOTOMY Left 1/2014   ? CATARACT EXTRACTION, BILATERAL  1/2010   ? DILATION AND CURETTAGE OF UTERUS      X3   ? TONSILLECTOMY AND ADENOIDECTOMY      Family History   Problem Relation Age of Onset   ? Hypertension Mother    ? Hypertension Father    ? Stroke Father    ? Kidney disease Maternal Grandmother    ? Kidney disease Maternal Grandfather    ? Early death Maternal Grandfather    ? Heart disease Maternal Grandfather    ? Kidney disease Paternal Grandmother    ? Kidney disease Paternal Grandfather    ? Early death Paternal Grandfather    ? Heart disease Paternal Grandfather     Social History     Social History   ? Marital status:      Spouse name: N/A   ? Number of children: N/A   ? Years of education: N/A     Occupational History   ? Not on file.     Social History Main Topics   ? Smoking status: Never Smoker   ? Smokeless tobacco: Never Used   ? Alcohol use No   ? Drug use: No   ? Sexual  activity: Not on file     Other Topics Concern   ? Not on file     Social History Narrative          Medications  Allergies   Current Outpatient Prescriptions   Medication Sig Dispense Refill   ? acetaminophen (TYLENOL) 500 MG tablet Take 500-1,000 mg by mouth every 6 (six) hours as needed for pain.      ? aspirin 81 MG EC tablet Take 81 mg by mouth at bedtime.      ? atenolol (TENORMIN) 25 MG tablet Take 1 tablet (25 mg total) by mouth daily. 30 tablet 3   ? BIOTIN ORAL Take 1 capsule by mouth daily.     ? calcium-vitamin D (CALCIUM-VITAMIN D) 500 mg(1,250mg) -200 unit per tablet Take 1 tablet by mouth daily.     ? furosemide (LASIX) 20 MG tablet Take 1 tablet (20 mg total) by mouth daily. 90 tablet 3   ? isosorbide mononitrate (IMDUR) 30 MG 24 hr tablet Take 0.5 tablets (15 mg total) by mouth daily. 45 tablet 1   ? multivitamin therapeutic (THERAGRAN) tablet Take 1 tablet by mouth daily.     ? naproxen sodium (ALEVE) 220 MG tablet Take 220 mg by mouth every 12 (twelve) hours as needed for pain.      ? OMEGA-3/DHA/EPA/FISH OIL (FISH OIL-OMEGA-3 FATTY ACIDS) 300-1,000 mg capsule Take 2 g by mouth daily.     ? PRAMOXINE/MENTHOL/PETROLATUM (SARNA ULTRA TOP) Apply topically 2 (two) times a day.     ? sertraline (ZOLOFT) 50 MG tablet Take 50 mg by mouth daily.      ? vitamin A-vitamin C-vit E-min (OCUVITE) Tab tablet Take 1 tablet by mouth daily.     ? omeprazole (PRILOSEC) 20 MG capsule Take 20 mg by mouth daily as needed. Take when using NSAIDS (ibuprofen, naproxen, etc)     ? pravastatin (PRAVACHOL) 40 MG tablet Take 40 mg by mouth bedtime.       No current facility-administered medications for this visit.       No Known Allergies      Lab Results    Chemistry CBC BNP   Lab Results   Component Value Date    CREATININE 0.73 06/21/2018    BUN 14 06/21/2018     06/21/2018    K 4.4 06/21/2018     06/21/2018    CO2 26 06/21/2018     Creatinine (mg/dL)   Date Value   06/21/2018 0.73   10/16/2017 0.75    08/15/2017 0.83   05/16/2017 0.77    Lab Results   Component Value Date    WBC 6.8 05/15/2017    HGB 14.0 05/15/2017    HCT 41.2 05/15/2017    MCV 95 05/15/2017     05/16/2017    Lab Results   Component Value Date    BNP 99 06/21/2018     BNP (pg/mL)   Date Value   06/21/2018 99   02/08/2017 63   10/15/2013 86        40 minutes were spent with the patient with greater than 50% spent on education and counseling.    Oscar Woo, [facility] Heart [facility] [place]   Heart Failure Clinic           This note has been dictated using voice recognition software. Any grammatical, typographical, or context distortions are unintentional and inherent to the software

## 2021-06-26 NOTE — PROGRESS NOTES
Progress Notes by Oscar Woo NP at 7/11/2018  7:50 AM     Author: Oscar Woo NP Service: -- Author Type: Nurse Practitioner    Filed: 7/11/2018 10:09 AM Encounter Date: 7/11/2018 Status: Addendum    : Oscar Woo NP (Nurse Practitioner)    Related Notes: Original Note by Oscar Woo NP (Nurse Practitioner) filed at 7/11/2018  9:39 AM           Click to link to [facility] Heart Care     [facility] HEART CARE NOTE      Assessment/Recommendations   Assessment:    1. Chronic diastolic dysfunction, NYHA class III: Not compensated-Isamar continues to have shortness of breath and exertion with long distance and taking flight of stairs, fatigue and lower extremity edema.  She has crackles in bilateral bases and mild JVD.  She felt some improvement in her lower extremity edema.  She also reports having some midsternal chest discomfort with exertion that lasts for a few seconds and resolves without intervention-known coronary artery disease with 100% lesion in LAD and she is on Imdur 15 mg daily. She has lost about 5 pounds since last seen in the heart failure clinic and her furosemide dose was increased.  She reports not drinking adequate fluid on some days.    2. Syncope, Sick Sinus Syndrome,  Atrial Fibrillation (s/p Pacer): Heart rate controlled in 70s.  Normal device function with no significant arrhythmia per last device check on 6/21/2018.  Rate response activity threshold was changed to low from medium to low due to patient complaint of activity intolerance.  However this has not improved per patient.    3. Hypertension: 104/60.  On atenolol, Imdur and furosemide.  Per patient her PCP reduced her atenolol from 25 mg to 12.5 mg daily.      Plan:  1.  Patient watched the heart failure video today.  Although her BNP is normal, her signs and symptoms are consistent with fluid overload.  Will increase her diuretic for a few days and monitor closely.  Patient was highly encouraged to call if  experiencing lightheaded or dizziness with worsening heart failure symptoms.  Heart failure medications:  - Diuretic therapy with furosemide increased from 40 mg daily to 40 mg in a.m. and 20 mg in p.m. for 3 days and call if no improvement in HF symptoms.  -Encouraged fluid intake of 1 and half liter or 50 ounces per day  -Low-sodium 2 g per day, daily weight and stay active as tolerated    2.  Recommended patient to hold atenolol for 3 days while on higher dose of furosemide to prevent hypotension.    3.  Once her fluid level is controlled, if she continues to have shortness of breath-we will consider increasing her Imdur and will further discuss with Dr. Rene.    Isamar also mentioned insomnia which might be contributing to her fatigue. She also has some skin cancer that requires treatment. Recommended to f/u with PCP for insomnia and dermatology regarding skin cancer.    Follow up with Oscar in HF clinic in 2 weeks     History of Present Illness    Ms. Isamar Little is a 89 y.o. female with a significant past medical history of coronary disease with status post PCI to diagonal and circumflex (2010)and known 100% LAD stenosis, syncope, sick sinus syndrome with status post pacemaker(May 2017), chronic lower extremity edema, and chronic diastolic heart failure who is seen at [facility] heart failure clinic today for continued follow-up per Dr. Rene.  Patient was recently seen by Dr. Rene on 6/21/2018 and was noted  patient being concerned about her lower extremity edema.  She always had chronic lower extremity edema with (Rti>Lt).  Her recent BNP level is 99.  She was started on a low dose of furosemide.  Her most recent echocardiogram done in May 2017 showed an EF of 63% with grade 1 mild diastolic dysfunction, mild to moderate tricuspid and mild to moderate mitral regurgitation. Most recent stress test in May 2017 showed medium-sized area of infarct in the anterior, apical and anteroseptal segment  of the LV but no evidence of inducible ischemia.    Patient presented to the heart care clinic accompanied by her friend.  During her last heart failure clinic visit, her furosemide dose was increased.  She has lost about 5 pounds.  However, she continues to have shortness of breath on exertion, fatigue, lower extremity edema.  She also reports some midsternal chest discomfort that lasts for few seconds and resolves without intervention. She denies shortness of breath, orthopnea, PND, palpitations, chest pain and abdominal fullness/bloating.  She gets mild lightheaded when she bends forward.    She is monitoring home weights which is down to 234 lbs (She was 238 lbs on 6/29/18)  She is following a low sodium diet.  She does not exercise on a regular basis but has been staying active.    ECHO 5/15/2017:   Summary     1.Left ventricle ejection fraction is normal. The calculated left ventricular ejection fraction is 63%.    2.Mild to moderate tricuspid and mild to moderate mitral regurgitation.    3.When compared to the report of the previous study dated 7/1/2008, there are changes noted. Images unavailable but apparently mild to moderate tricuspid and mitral regurgitations are new as is distal septal wall motion abnormality.          Physical Examination Review of Systems   Vitals:    07/11/18 0757   BP: 104/60   Pulse: 72   Resp: 16     Body mass index is 29.29 kg/(m^2).  Wt Readings from Last 3 Encounters:   07/11/18 145 lb (65.8 kg)   06/28/18 147 lb (66.7 kg)   06/21/18 148 lb (67.1 kg)       General Appearance:     Alert, cooperative and in no acute distress.   ENT/Mouth: membranes moist, no oral lesions or bleeding gums.      EYES:  no scleral icterus, normal conjunctivae   Neck: no carotid bruits or thyromegaly   Chest/Lungs:   lungs are clear to auscultation, no rales or wheezing, respirations unlabored except mild crackles in bilateral bases   Cardiovascular:   Normal first and second heart sounds with no  murmurs, rubs, or gallops; the carotid, radial and posterior tibial pulses are intact, Jugular venous pressure mild with no HJR, 1-2 + edema bilateral lower extremities (Rt>Lt)   Abdomen:  Soft, nontender, nondistended, bowel sounds present   Extremities: no cyanosis or clubbing   Skin: warm, dry.    Neurologic: mood and affect are appropriate, alert and oriented x3      General: WNL  Eyes: Visual Distubance  Ears/Nose/Throat: Hearing Loss  Lungs: Cough  Heart: Shortness of Breath with activity, Leg Swelling  Stomach: WNL  Bladder: WNL  Muscle/Joints: WNL  Skin: WNL  Nervous System: Daytime Sleepiness  Mental Health: WNL     Blood: WNL     Medical History  Surgical History Family History Social History   Past Medical History:   Diagnosis Date   ? Anxiety state, unspecified    ? Bereavement, uncomplicated    ? Coronary atherosclerosis of native coronary artery    ? Depressive disorder, not elsewhere classified    ? Edema    ? Essential hypertension, benign    ? Other and unspecified hyperlipidemia    ? Other malaise and fatigue    ? Skin cancer     Past Surgical History:   Procedure Laterality Date   ? ANGIOPLASTY  6/2008   ? CAPSULOTOMY Left 1/2014   ? CATARACT EXTRACTION, BILATERAL  1/2010   ? DILATION AND CURETTAGE OF UTERUS      X3   ? TONSILLECTOMY AND ADENOIDECTOMY      Family History   Problem Relation Age of Onset   ? Hypertension Mother    ? Hypertension Father    ? Stroke Father    ? Kidney disease Maternal Grandmother    ? Kidney disease Maternal Grandfather    ? Early death Maternal Grandfather    ? Heart disease Maternal Grandfather    ? Kidney disease Paternal Grandmother    ? Kidney disease Paternal Grandfather    ? Early death Paternal Grandfather    ? Heart disease Paternal Grandfather     Social History     Social History   ? Marital status:      Spouse name: N/A   ? Number of children: N/A   ? Years of education: N/A     Occupational History   ? Not on file.     Social History Main Topics    ? Smoking status: Never Smoker   ? Smokeless tobacco: Never Used   ? Alcohol use No   ? Drug use: No   ? Sexual activity: Not on file     Other Topics Concern   ? Not on file     Social History Narrative          Medications  Allergies   Current Outpatient Prescriptions   Medication Sig Dispense Refill   ? acetaminophen (TYLENOL) 500 MG tablet Take 500-1,000 mg by mouth every 6 (six) hours as needed for pain.      ? aspirin 81 MG EC tablet Take 81 mg by mouth at bedtime.      ? atenolol (TENORMIN) 25 MG tablet Take 1 tablet (25 mg total) by mouth daily. (Patient taking differently: Take 12.5 mg by mouth daily. ) 30 tablet 3   ? BIOTIN ORAL Take 1 capsule by mouth daily.     ? calcium-vitamin D (CALCIUM-VITAMIN D) 500 mg(1,250mg) -200 unit per tablet Take 1 tablet by mouth daily.     ? furosemide (LASIX) 20 MG tablet Take 40 mg in AM and 20 mg in the afternoon for 3 days 180 tablet 3   ? isosorbide mononitrate (IMDUR) 30 MG 24 hr tablet Take 0.5 tablets (15 mg total) by mouth daily. 45 tablet 1   ? multivitamin therapeutic (THERAGRAN) tablet Take 1 tablet by mouth daily.     ? naproxen sodium (ALEVE) 220 MG tablet Take 220 mg by mouth every 12 (twelve) hours as needed for pain.      ? OMEGA-3/DHA/EPA/FISH OIL (FISH OIL-OMEGA-3 FATTY ACIDS) 300-1,000 mg capsule Take 2 g by mouth daily.     ? omeprazole (PRILOSEC) 20 MG capsule Take 20 mg by mouth daily as needed. Take when using NSAIDS (ibuprofen, naproxen, etc)     ? PRAMOXINE/MENTHOL/PETROLATUM (SARNA ULTRA TOP) Apply topically 2 (two) times a day.     ? pravastatin (PRAVACHOL) 40 MG tablet Take 40 mg by mouth bedtime.     ? sertraline (ZOLOFT) 50 MG tablet Take 50 mg by mouth daily.      ? vitamin A-vitamin C-vit E-min (OCUVITE) Tab tablet Take 1 tablet by mouth daily.       No current facility-administered medications for this visit.       No Known Allergies      Lab Results    Chemistry CBC BNP   Lab Results   Component Value Date    CREATININE 0.78 06/28/2018     BUN 16 06/28/2018     06/28/2018    K 4.3 06/28/2018     06/28/2018    CO2 30 06/28/2018     Creatinine (mg/dL)   Date Value   06/28/2018 0.78   06/21/2018 0.73   10/16/2017 0.75   08/15/2017 0.83    Lab Results   Component Value Date    WBC 6.8 05/15/2017    HGB 14.0 05/15/2017    HCT 41.2 05/15/2017    MCV 95 05/15/2017     05/16/2017    Lab Results   Component Value Date    BNP 99 06/21/2018     BNP (pg/mL)   Date Value   06/21/2018 99   02/08/2017 63   10/15/2013 86        30  minutes were spent with the patient with greater than 50% spent on education and counseling.    Oscar Woo, [facility] Heart [facility]   Heart Failure Clinic           This note has been dictated using voice recognition software. Any grammatical, typographical, or context distortions are unintentional and inherent to the software

## 2021-06-26 NOTE — PROGRESS NOTES
Progress Notes by Rufino Rene MD at 9/26/2018 12:50 PM     Author: Rufino Rene MD Service: -- Author Type: Physician    Filed: 10/18/2018 10:06 PM Encounter Date: 9/26/2018 Status: Addendum    : Rufino Rene MD (Physician)    Related Notes: Original Note by Rufino Rene MD (Physician) filed at 9/26/2018  1:32 PM       [facility] Progress Note    Assessment:  1.  Coronary artery disease.  Anginal symptoms appear stable.  We talked about monitoring for progressive symptoms of chest discomfort.  We talked about follow-up stress testing to further define ischemia with her known coronary artery disease.  She does wish to pursue.  The chest discomfort appears to be limited to more rigorous levels of activity.  She is asked to seek more immediate attention if symptoms are worsening or progressive.  I did give her a prescription for sublingual nitroglycerin and instructed her in its use.    2.  Heart failure with preserved ejection fraction.  Functional class II.  She appears to be well compensated on current examination.    3.  Hypertension blood pressure appears to be under good control.  He is on furosemide and spironolactone with stable electrolytes July 2018.    4.  Sick sinus syndrome.  Pacemaker functioning normally with normal pacemaker function September 2018.    5.  Dyslipidemia.  She tells me she has not been taking her pravastatin regularly as the pill is difficult to swallow.  I asked her to try cutting it in half and taking 2 half tablets.      Plan: As outlined above.  Follow-up 4-5 months.    1. CAD (coronary artery disease)  NM Pharmacologic Stress Test    Lipid Profile    nitroglycerin (NITROSTAT) 0.4 MG SL tablet   2. Hyperlipidemia with target LDL less than 70     3. SSS (sick sinus syndrome) (H)     4. Chronic diastolic congestive heart failure (H)           An After Visit Summary was printed and given to the patient.    Subjective:    Isamar Little is a 89  y.o. female who returned for a planned  follow up visit.  She reports overall she is been feeling well.  She remains active.  Does have some occasional arm discomfort and chest discomfort and gives the example if she is over zelous with climbing a flight of stairs.  This typically resolves within 5 minutes or 10 minutes of rest.  She is not describing progressive symptoms of chest discomfort and has described similar symptoms in the past.  She has a history of known coronary artery disease with coronary angiography in 2010 that reported a diagonal branch and circumflex intervention with 100% stenosis of the LAD.  The LAD was not intervened upon as best as I could tell.  She is status post pacemaker approximately 1 year ago for sick sinus syndrome and syncope.  She has had no additional syncopal episodes.  She had a negative nuclear stress test in May 2017.    She tells me she has not been taking the pravastatin as regularly as she should.  She states that the difficult pill to swallow.  We talked about cutting the pill in half.    Review of Systems:   General: WNL  Eyes: WNL  Ears/Nose/Throat: WNL  Lungs: Cough  Heart: Arm Pain, Shortness of Breath with activity, Leg Swelling  Stomach: WNL  Bladder: WNL  Muscle/Joints: WNL  Skin: Rash  Nervous System: WNL  Mental Health: WNL     Blood: WNL      Problem List:    Patient Active Problem List   Diagnosis   ? Essential hypertension with goal blood pressure less than 140/90   ? Anxiety state, unspecified   ? Hyperlipidemia with target LDL less than 70   ? Bereavement, uncomplicated   ? Other malaise and fatigue   ? Edema   ? Depressive disorder, not elsewhere classified   ? Skin cancer   ? Syncope, cardiogenic   ? Coronary artery disease due to lipid rich plaque   ? Paroxysmal atrial fibrillation (H)   ? SSS (sick sinus syndrome) (H)   ? Cardiac pacemaker, dual, in situ   ? Chronic diastolic congestive heart failure (H)       Social History     Social History   ? Marital  status:      Spouse name: N/A   ? Number of children: N/A   ? Years of education: N/A     Occupational History   ? Not on file.     Social History Main Topics   ? Smoking status: Never Smoker   ? Smokeless tobacco: Never Used   ? Alcohol use No   ? Drug use: No   ? Sexual activity: Not on file     Other Topics Concern   ? Not on file     Social History Narrative       Family History   Problem Relation Age of Onset   ? Hypertension Mother    ? Hypertension Father    ? Stroke Father    ? Kidney disease Maternal Grandmother    ? Kidney disease Maternal Grandfather    ? Early death Maternal Grandfather    ? Heart disease Maternal Grandfather    ? Kidney disease Paternal Grandmother    ? Kidney disease Paternal Grandfather    ? Early death Paternal Grandfather    ? Heart disease Paternal Grandfather        Current Outpatient Prescriptions   Medication Sig Dispense Refill   ? acetaminophen (TYLENOL) 500 MG tablet Take 500-1,000 mg by mouth every 6 (six) hours as needed for pain.      ? aspirin 81 MG EC tablet Take 81 mg by mouth at bedtime.      ? atenolol (TENORMIN) 25 MG tablet Take 1 tablet (25 mg total) by mouth daily. (Patient taking differently: Take 12.5 mg by mouth daily. ) 30 tablet 3   ? BIOTIN ORAL Take 1 capsule by mouth daily.     ? calcium-vitamin D (CALCIUM-VITAMIN D) 500 mg(1,250mg) -200 unit per tablet Take 1 tablet by mouth daily.     ? isosorbide mononitrate (IMDUR) 30 MG 24 hr tablet Take 1 tablet (30 mg total) by mouth daily. 30 tablet 11   ? mirtazapine (REMERON) 15 MG tablet Take 15 mg by mouth at bedtime.     ? multivitamin therapeutic (THERAGRAN) tablet Take 1 tablet by mouth daily.     ? OMEGA-3/DHA/EPA/FISH OIL (FISH OIL-OMEGA-3 FATTY ACIDS) 300-1,000 mg capsule Take 2 g by mouth daily.     ? omeprazole (PRILOSEC) 20 MG capsule Take 20 mg by mouth daily as needed. Take when using NSAIDS (ibuprofen, naproxen, etc)     ? PRAMOXINE/MENTHOL/PETROLATUM (SARNA ULTRA TOP) Apply topically 2 (two)  "times a day.     ? pravastatin (PRAVACHOL) 40 MG tablet Take 40 mg by mouth bedtime.     ? sertraline (ZOLOFT) 50 MG tablet Take 50 mg by mouth daily.      ? spironolactone-hydrochlorothiazide (ALDACTAZIDE) 25-25 mg tablet Take 1 tablet by mouth daily.     ? vitamin A-vitamin C-vit E-min (OCUVITE) Tab tablet Take 1 tablet by mouth daily.     ? furosemide (LASIX) 20 MG tablet Take 1 tablet (20 mg total) by mouth daily. 90 tablet 3   ? naproxen sodium (ALEVE) 220 MG tablet Take 220 mg by mouth every 12 (twelve) hours as needed for pain.      ? nitroglycerin (NITROSTAT) 0.4 MG SL tablet Place 1 tablet (0.4 mg total) under the tongue every 5 (five) minutes as needed for chest pain. 1 Bottle 2     No current facility-administered medications for this visit.        Objective:     /68 (Patient Site: Right Arm, Patient Position: Sitting, Cuff Size: Adult Regular)  Pulse 68  Resp 16  Ht 4' 11\" (1.499 m)  Wt 142 lb (64.4 kg)  BMI 28.68 kg/m2  142 lb (64.4 kg)       Physical Exam:    GENERAL APPEARANCE: alert, no apparent distress  HEENT: no scleral icterus or xanthelasma  NECK: jugular venous pressure not elevated on today's examination.  CHEST: symmetric, the lungs are clear to auscultation him a pacemaker site well-healed  CARDIOVASCULAR: regular rhythm with systolic murmur; no carotid bruits  Abdomen: No Organomegaly, masses, bruits, or tenderness. Bowels sounds are present      EXTREMITIES: no cyanosis, clubbing, trace edema    Cardiac Testing:  Study date: 17         Patient Information      Patient Name MRN Sex              Age     Isamar Little [id number] Female 1928 (89 y.o.)       EKG Scan       Scan on: 17 10:38 AM by:       Conclusion        The pharmacologic nuclear stress test is abnormal.    There is a medium sized area of infarct in the anterior, apical and anteroseptal segment(s) of the left ventricle. No evidence of Lexiscan induced ischemia.    The left ventricular ejection " fraction is 63%.    The findings of this examination were communicated to Dr. Rene.    There is no prior study available.        Indications      Syncope       Summary        1.Left ventricle ejection fraction is normal. The calculated left ventricular ejection fraction is 63%.    2.Mild to moderate tricuspid and mild to moderate mitral regurgitation.    3.When compared to the report of the previous study dated 7/1/2008, there are changes noted. Images unavailable but apparently mild to moderate tricuspid and mitral regurgitations are new as is distal septal wall motion abnormality.                 Lab Results:    Lab Results   Component Value Date     07/25/2018    K 4.3 07/25/2018     07/25/2018    CO2 26 07/25/2018    BUN 16 07/25/2018    CREATININE 0.75 07/25/2018    CALCIUM 9.8 07/25/2018     Lab Results   Component Value Date    CHOL 182 10/16/2017    TRIG 102 10/16/2017    HDL 47 (L) 10/16/2017     BNP (pg/mL)   Date Value   06/21/2018 99   02/08/2017 63   10/15/2013 86     Creatinine (mg/dL)   Date Value   07/25/2018 0.75   06/28/2018 0.78   06/21/2018 0.73   10/16/2017 0.75     LDL Calculated (mg/dL)   Date Value   10/16/2017 115   10/11/2010 93     Lab Results   Component Value Date    WBC 6.8 05/15/2017    HGB 14.0 05/15/2017    HCT 41.2 05/15/2017    MCV 95 05/15/2017     05/16/2017               This note has been dictated using voice recognition software. Any grammatical or context distortions are unintentional and inherent to the software.      Rufino Rene M.D., F.A.C.C.  [facility]  [phone]

## 2021-06-26 NOTE — PROGRESS NOTES
Progress Notes by Oscar Woo NP at 7/25/2018  1:30 PM     Author: Oscar Woo NP Service: -- Author Type: Nurse Practitioner    Filed: 7/25/2018  3:08 PM Encounter Date: 7/25/2018 Status: Signed    : Oscar Woo NP (Nurse Practitioner)           Click to link to [facility]     [facility] NOTE      Assessment/Recommendations   Assessment:    1. Chronic diastolic dysfunction, NYHA class III: Reports overall improvement in weight, shortness of breath, and lower extremity edema.   She lost about 5-7 pounds since being on a diuretic.    2.  History of coronary artery disease with s/p PCI to diagonal and circumflex in 2010 and known 100% LAD stenosis: Gets occasional mild midsternal chest discomfort with exertion and stress which resolves without intervention. Currently on isosorbide mononitrate 50 mg daily.  She is also on aspirin, atenolol and pravastatin    3.  Hypertension: 108/60 and heart rate 60.  Asymptomatic.  On atenolol and isosorbide.    Plan:  1.  No further medication changes made today.  Will obtain BMP level to check her electrolytes given she is on furosemide.  Will consider making further dose adjustment based on the lab result.  Reinforced low-sodium diet, daily weight, and stay active as tolerated.  Highly encouraged to call back if experiencing worsening heart failure symptoms.      Heart failure medications:  -  Diuretic therapy with furosemide 40 mg daily  -BMP pending    2.  Declined to increase her isosorbide dose.  She will continue aspirin, isosorbide, atenolol and pravastatin.  Encouraged patient to call back if having increase frequency and intensity of her midsternal chest discomfort.    3.  Limit fluid intake to 1.5 L or 2 L or 50-60 ounces of fluid per day    4.  Declined compression stocking for leg swelling.  Recommended to elevate leg above heart level for lower extremity edema    4.   Encouraged patient to call back if experiencing lightheaded or  dizziness.     Follow up with me in 4 weeks and Dr. Rene in September as scheduled     History of Present Illness    Ms. Isamar Little is a 89 y.o. female with a significant past medical history of coronary disease with status post PCI to diagonal and circumflex (2010)and known 100% LAD stenosis, SSS s/p pacemaker(May 2017), chronic lower extremity edema, and chronic diastolic heart failure who is seen at [facility] heart failure clinic today for continued follow-up per Dr. Rene. Patient was recently seen by Dr. Rene on 6/21/2018 and was noted  patient being concerned about her lower extremity edema.  She always had chronic lower extremity edema with (Rti>Lt).  Her recent BNP level was 99.  She was started on a low dose of furosemide.  Her most recent echocardiogram done in May 2017 showed an EF of 63% with grade 1 mild diastolic dysfunction, mild to moderate tricuspid and mild to moderate mitral regurgitation. Most recent stress test in May 2017 showed medium-sized area of infarct in the anterior, apical and anteroseptal segment of the LV but no evidence of inducible ischemia.    During last heart failure clinic visit, her furosemide dose was increased for 3 days.  Patient has lost about 5-7 pounds since initial heart failure clinic visit in June 2018.  She overall feels an improvement in her heart failure symptoms.  She does notice mild midsternal chest discomfort with exertion or with stress which resolves without intervention.  She denies lightheadedness, shortness of breath, orthopnea, PND, palpitations and abdominal fullness/bloating.      She is monitoring home weights which are stable between 131-133pounds.  She is following a low sodium diet.  She stays active and walks around in the hallway at her apartment for about 5-10 minutes without having symptoms.    Per patient, she underwent some biopsy for skin cancer and was recommended to undergo  procedure to treat her skin cancer.  She also has  some issue with the rash and itching for which she has not followed up with the dermatology yet.  Patient's friend, Sandi is going to help her set up appointment with dermatology.        Physical Examination Review of Systems   Vitals:    07/25/18 1334   BP: 108/60   Pulse: 80   Resp: 18     Body mass index is 29.29 kg/(m^2).  Wt Readings from Last 3 Encounters:   07/25/18 145 lb (65.8 kg)   07/11/18 145 lb (65.8 kg)   06/28/18 147 lb (66.7 kg)       General Appearance:     Alert, cooperative and in no acute distress.   ENT/Mouth: membranes moist, no oral lesions or bleeding gums.      EYES:  no scleral icterus, normal conjunctivae   Neck: no carotid bruits or thyromegaly   Chest/Lungs:   lungs are clear to auscultation, no rales or wheezing, respirations unlabored except mild diminished with faint crackles in LLL   Cardiovascular:    Normal first and second heart sounds with no murmurs, rubs, or gallops; the carotid, radial and posterior tibial pulses are intact, Jugular venous pressure flat with no HJR, *1-2 +  edema bilateral lower extremities (Rt>lt)   Abdomen:  Large but soft, nontender, nondistended, bowel sounds present   Extremities: no cyanosis or clubbing   Skin: warm, dry.    Neurologic: mood and affect are appropriate, alert and oriented x3      General: WNL  Eyes: WNL  Ears/Nose/Throat: WNL  Lungs: Cough, Shortness of Breath  Heart: Shortness of Breath with activity, Leg Swelling  Stomach: WNL  Bladder: WNL  Muscle/Joints: WNL  Skin: WNL  Nervous System: WNL  Mental Health: WNL     Blood: Easy Bruising     Medical History  Surgical History Family History Social History   Past Medical History:   Diagnosis Date   ? Anxiety state, unspecified    ? Bereavement, uncomplicated    ? Coronary atherosclerosis of native coronary artery    ? Depressive disorder, not elsewhere classified    ? Edema    ? Essential hypertension, benign    ? Other and unspecified hyperlipidemia    ? Other malaise and fatigue    ? Skin  cancer     Past Surgical History:   Procedure Laterality Date   ? ANGIOPLASTY  6/2008   ? CAPSULOTOMY Left 1/2014   ? CATARACT EXTRACTION, BILATERAL  1/2010   ? DILATION AND CURETTAGE OF UTERUS      X3   ? TONSILLECTOMY AND ADENOIDECTOMY      Family History   Problem Relation Age of Onset   ? Hypertension Mother    ? Hypertension Father    ? Stroke Father    ? Kidney disease Maternal Grandmother    ? Kidney disease Maternal Grandfather    ? Early death Maternal Grandfather    ? Heart disease Maternal Grandfather    ? Kidney disease Paternal Grandmother    ? Kidney disease Paternal Grandfather    ? Early death Paternal Grandfather    ? Heart disease Paternal Grandfather     Social History     Social History   ? Marital status:      Spouse name: N/A   ? Number of children: N/A   ? Years of education: N/A     Occupational History   ? Not on file.     Social History Main Topics   ? Smoking status: Never Smoker   ? Smokeless tobacco: Never Used   ? Alcohol use No   ? Drug use: No   ? Sexual activity: Not on file     Other Topics Concern   ? Not on file     Social History Narrative          Medications  Allergies   Current Outpatient Prescriptions   Medication Sig Dispense Refill   ? acetaminophen (TYLENOL) 500 MG tablet Take 500-1,000 mg by mouth every 6 (six) hours as needed for pain.      ? aspirin 81 MG EC tablet Take 81 mg by mouth at bedtime.      ? atenolol (TENORMIN) 25 MG tablet Take 1 tablet (25 mg total) by mouth daily. (Patient taking differently: Take 12.5 mg by mouth daily. ) 30 tablet 3   ? BIOTIN ORAL Take 1 capsule by mouth daily.     ? calcium-vitamin D (CALCIUM-VITAMIN D) 500 mg(1,250mg) -200 unit per tablet Take 1 tablet by mouth daily.     ? furosemide (LASIX) 20 MG tablet Take 40 mg in AM and 20 mg in the afternoon for 3 days (Patient taking differently: Take 40 mg in AM) 180 tablet 3   ? isosorbide mononitrate (IMDUR) 30 MG 24 hr tablet Take 0.5 tablets (15 mg total) by mouth daily. 45 tablet  1   ? mirtazapine (REMERON) 15 MG tablet Take 15 mg by mouth at bedtime.     ? multivitamin therapeutic (THERAGRAN) tablet Take 1 tablet by mouth daily.     ? naproxen sodium (ALEVE) 220 MG tablet Take 220 mg by mouth every 12 (twelve) hours as needed for pain.      ? OMEGA-3/DHA/EPA/FISH OIL (FISH OIL-OMEGA-3 FATTY ACIDS) 300-1,000 mg capsule Take 2 g by mouth daily.     ? omeprazole (PRILOSEC) 20 MG capsule Take 20 mg by mouth daily as needed. Take when using NSAIDS (ibuprofen, naproxen, etc)     ? PRAMOXINE/MENTHOL/PETROLATUM (SARNA ULTRA TOP) Apply topically 2 (two) times a day.     ? pravastatin (PRAVACHOL) 40 MG tablet Take 40 mg by mouth bedtime.     ? sertraline (ZOLOFT) 50 MG tablet Take 50 mg by mouth daily.      ? vitamin A-vitamin C-vit E-min (OCUVITE) Tab tablet Take 1 tablet by mouth daily.       No current facility-administered medications for this visit.       No Known Allergies      Lab Results    Chemistry CBC BNP   Lab Results   Component Value Date    CREATININE 0.78 06/28/2018    BUN 16 06/28/2018     06/28/2018    K 4.3 06/28/2018     06/28/2018    CO2 30 06/28/2018     Creatinine (mg/dL)   Date Value   06/28/2018 0.78   06/21/2018 0.73   10/16/2017 0.75   08/15/2017 0.83    Lab Results   Component Value Date    WBC 6.8 05/15/2017    HGB 14.0 05/15/2017    HCT 41.2 05/15/2017    MCV 95 05/15/2017     05/16/2017    Lab Results   Component Value Date    BNP 99 06/21/2018     BNP (pg/mL)   Date Value   06/21/2018 99   02/08/2017 63   10/15/2013 86          Oscar Woo CNP  [facility]   Heart Failure Clinic           This note has been dictated using voice recognition software. Any grammatical, typographical, or context distortions are unintentional and inherent to the software

## 2021-06-26 NOTE — PROGRESS NOTES
Progress Notes by Oscar Woo NP at 11/28/2018 10:30 AM     Author: Oscar Woo NP Service: -- Author Type: Nurse Practitioner    Filed: 11/28/2018 12:03 PM Encounter Date: 11/28/2018 Status: Signed    : Oscar Woo NP (Nurse Practitioner)           Click to link to [facility] Heart Care     [facility] HEART CARE NOTE      Assessment/Recommendations   Assessment:    1.  Chronic diastolic heart failure, NYHA class III: Well compensated.  She has no acute signs and symptoms of heart failure except mild lower extremity edema.  She is following low-sodium diet.  Her weight has been stable.    2.  Coronary artery disease with status post PCI to diagonal and circumflex in 2010 and with known 100% stenosis of LAD: She has mild sternal chest discomfort with exertion and stress but this has not worsened.  Her recent stress test showed small area of ischemia in the inferoseptal LV with an medium size area of transmural and apical and inferoseptal LV.  She is on aspirin 81 mg daily.    3.  Hyperlipidemia: Her recent LDL is 105.  Patient has been inconsistent with taking her pravastatin.  Dr. Rene has recommended to cut the tablet in half and take for easy swallow.  We also discussed about heart healthy diet.    4.  Hypertension: Blood pressure today is 100/68.  She has been taking full dose of atenolol 25 mg daily.  She thinks she is not on spironolactone/hydrochlorothiazide that was discontinued in the past.    Plan:  1.  Reinforced low-sodium diet, daily weight and stay active as tolerated.  Continue current dose of furosemide 20 mg daily.  Most recent BMP stable.  2.  Patient was encouraged to call if worsening chest discomfort or shortness of breath.  Continue aspirin 81 daily.  3.  Patient was highly encouraged to stay compliant with taking her pravastatin daily and have repeat lipid profile during next follow-up visit with Dr. Rene.  Patient was recommended to fast for 12 hours prior to lab draw.  4.   Decrease atenolol from 25 mg to 12.5 mg daily.  Patient was encouraged to monitor blood pressure with 3-4 readings and call in a week.   Follow up with  in January 2019 and Oscar as needed in HF clinic.     History of Present Illness    Ms. Isamar Little is a 89 y.o. female with a significant past medical history of coronary disease with status post PCI to diagonal and circumflex (2010)and known 100% LAD stenosis, SSS s/p pacemaker(May 2017), chronic lower extremity edema, and chronic diastolic heart failure who is seen at [facility] heart failure clinic today for continued follow-up.     Patient recently saw Dr. Rene in September 2018.  With her ongoing angina, she underwent repeat stress test showed small area of ischemia and inferoseptal LV and medium-sized area of transmural and apical and inferoseptal of the LV.  Stress test was reviewed by Dr. Rene and recommended patient to continue on medication management and monitor symptoms for worsening.    Today, patient presented to the heart failure clinic accompanied by her friend Sheridan.  She denies fatigue, shortness of breath, orthopnea, PND, palpitations, abdominal fullness/bloating and lower extremity edema.  She denies worsening symptoms of shortness of breath or chest comfort.  She stopped taking stairs but continues to stay active as usual and has been tolerating well.  She gets mild lightheaded when she bends forward.  She has been taking the full dose of atenolol although the dose was reduced and have in the past due to low blood pressure.  She denies taking spironolactone and hydrochlorothiazide but will double check and call us.  She has not started taking her pravastatin regularly yet.  She also recently had a follow-up with the dermatology and found out a cancer spot on her face.  She was recommended to have surgery but she postponed it to after the holiday.    She is monitoring home weights which are stable between 133-135  ambar    Pharmacologic stress test on 10/17/2018-reviewed  Conclusion       The pharmacologic nuclear stress test is abnormal.    There is a small area of ischemia in the inferoseptal segment(s) of the left ventricle. There is a medium sized area of transmural infarction in the apical and anteroseptal segment(s) of the left ventricle.    The left ventricular ejection fraction is 70%.    When compared to the images of 5/16/2017, on review of prior images there was a small area of inferoseptal ischemia. No change when directly compared to current study images          Physical Examination Review of Systems   Vitals:    11/28/18 1023   BP: 100/68   Pulse: 80   Resp: 18     Body mass index is 28.88 kg/m .  Wt Readings from Last 3 Encounters:   11/28/18 143 lb (64.9 kg)   09/26/18 142 lb (64.4 kg)   08/22/18 144 lb (65.3 kg)       General Appearance:     Alert, cooperative and in no acute distress.   ENT/Mouth: membranes moist, no oral lesions or bleeding gums.      EYES:  no scleral icterus, normal conjunctivae   Neck: no carotid bruits or thyromegaly   Chest/Lungs:   lungs are clear to auscultation, no rales or wheezing, respirations unlabored   Cardiovascular:    Heart rate regular. Normal first and second heart sounds with no murmurs, rubs, or gallops; the carotid, radial and posterior tibial pulses are intact, Jugular venous pressure flat with no HJR, mild 1-2+  edema bilateral lower extremities    Abdomen:  Large but soft, nontender, nondistended, bowel sounds present   Extremities: no cyanosis or clubbing   Skin: warm, dry.    Neurologic: mood and affect are appropriate, alert and oriented x3      General: WNL  Eyes: Visual Distubance  Ears/Nose/Throat: Hearing Loss  Lungs: WNL  Heart: Shortness of Breath with activity, Leg Swelling  Stomach: WNL  Bladder: WNL  Muscle/Joints: Joint Pain  Skin: Rash  Nervous System: Daytime Sleepiness, Dizziness  Mental Health: WNL     Blood: Easy Bruising     Medical History   Surgical History Family History Social History   Past Medical History:   Diagnosis Date   ? Anxiety state, unspecified    ? Bereavement, uncomplicated    ? Coronary atherosclerosis of native coronary artery    ? Depressive disorder, not elsewhere classified    ? Edema    ? Essential hypertension, benign    ? Other and unspecified hyperlipidemia    ? Other malaise and fatigue    ? Skin cancer     Past Surgical History:   Procedure Laterality Date   ? ANGIOPLASTY  6/2008   ? CAPSULOTOMY Left 1/2014   ? CATARACT EXTRACTION, BILATERAL  1/2010   ? DILATION AND CURETTAGE OF UTERUS      X3   ? TONSILLECTOMY AND ADENOIDECTOMY      Family History   Problem Relation Age of Onset   ? Hypertension Mother    ? Hypertension Father    ? Stroke Father    ? Kidney disease Maternal Grandmother    ? Kidney disease Maternal Grandfather    ? Early death Maternal Grandfather    ? Heart disease Maternal Grandfather    ? Kidney disease Paternal Grandmother    ? Kidney disease Paternal Grandfather    ? Early death Paternal Grandfather    ? Heart disease Paternal Grandfather     Social History     Socioeconomic History   ? Marital status:      Spouse name: Not on file   ? Number of children: Not on file   ? Years of education: Not on file   ? Highest education level: Not on file   Social Needs   ? Financial resource strain: Not on file   ? Food insecurity - worry: Not on file   ? Food insecurity - inability: Not on file   ? Transportation needs - medical: Not on file   ? Transportation needs - non-medical: Not on file   Occupational History   ? Not on file   Tobacco Use   ? Smoking status: Never Smoker   ? Smokeless tobacco: Never Used   Substance and Sexual Activity   ? Alcohol use: No   ? Drug use: No   ? Sexual activity: Not on file   Other Topics Concern   ? Not on file   Social History Narrative   ? Not on file          Medications  Allergies   Current Outpatient Medications   Medication Sig Dispense Refill   ? acetaminophen  (TYLENOL) 500 MG tablet Take 500-1,000 mg by mouth every 6 (six) hours as needed for pain.      ? aspirin 81 MG EC tablet Take 81 mg by mouth at bedtime.      ? atenolol (TENORMIN) 25 MG tablet Take 1 tablet (25 mg total) by mouth daily. (Patient taking differently: Take 12.5 mg by mouth daily. ) 30 tablet 3   ? BIOTIN ORAL Take 1 capsule by mouth daily.     ? calcium-vitamin D (CALCIUM-VITAMIN D) 500 mg(1,250mg) -200 unit per tablet Take 1 tablet by mouth daily.     ? furosemide (LASIX) 20 MG tablet Take 1 tablet (20 mg total) by mouth daily. 90 tablet 3   ? isosorbide mononitrate (IMDUR) 30 MG 24 hr tablet Take 1 tablet (30 mg total) by mouth daily. 30 tablet 11   ? mirtazapine (REMERON) 15 MG tablet Take 15 mg by mouth at bedtime.     ? multivitamin therapeutic (THERAGRAN) tablet Take 1 tablet by mouth daily.     ? nitroglycerin (NITROSTAT) 0.4 MG SL tablet Place 1 tablet (0.4 mg total) under the tongue every 5 (five) minutes as needed for chest pain. 1 Bottle 2   ? OMEGA-3/DHA/EPA/FISH OIL (FISH OIL-OMEGA-3 FATTY ACIDS) 300-1,000 mg capsule Take 2 g by mouth daily.     ? omeprazole (PRILOSEC) 20 MG capsule Take 20 mg by mouth daily as needed. Take when using NSAIDS (ibuprofen, naproxen, etc)     ? PRAMOXINE/MENTHOL/PETROLATUM (SARNA ULTRA TOP) Apply topically 2 (two) times a day.     ? pravastatin (PRAVACHOL) 40 MG tablet Take 20 mg by mouth at bedtime .           ? sertraline (ZOLOFT) 50 MG tablet Take 50 mg by mouth daily.      ? vitamin A-vitamin C-vit E-min (OCUVITE) Tab tablet Take 1 tablet by mouth daily.     ? naproxen sodium (ALEVE) 220 MG tablet Take 220 mg by mouth every 12 (twelve) hours as needed for pain.        No current facility-administered medications for this visit.       No Known Allergies      Lab Results    Chemistry CBC BNP   Lab Results   Component Value Date    CREATININE 0.75 07/25/2018    BUN 16 07/25/2018     07/25/2018    K 4.3 07/25/2018     07/25/2018    CO2 26  07/25/2018     Creatinine (mg/dL)   Date Value   07/25/2018 0.75   06/28/2018 0.78   06/21/2018 0.73   10/16/2017 0.75    Lab Results   Component Value Date    WBC 6.8 05/15/2017    HGB 14.0 05/15/2017    HCT 41.2 05/15/2017    MCV 95 05/15/2017     05/16/2017    Lab Results   Component Value Date    BNP 99 06/21/2018     BNP (pg/mL)   Date Value   06/21/2018 99   02/08/2017 63   10/15/2013 86        Oscar Woo [facility]   Heart Failure Clinic           This note has been dictated using voice recognition software. Any grammatical, typographical, or context distortions are unintentional and inherent to the software

## 2021-06-26 NOTE — PROGRESS NOTES
Progress Notes by Oscar Woo NP at 8/22/2018 12:50 PM     Author: Oscar Woo NP Service: -- Author Type: Nurse Practitioner    Filed: 8/22/2018  1:44 PM Encounter Date: 8/22/2018 Status: Signed    : Oscar Woo NP (Nurse Practitioner)           Click to link to [facility] Heart Care     [facility] HEART CARE NOTE      Assessment/Recommendations   Assessment:    1.  Chronic  diastolic dysfunction, NYHA class III: Well compensated except mild crackles in bilateral bases and lower extremity edema.  Her weight is 2 pounds above baseline but denies worsening heart failure symptoms.  She continues to have mild shortness of breath on exertion which is unchanged from baseline.    2.  History of coronary artery disease with status post PCI to diagonal and circumflex in 2010 and known 100% stenosis in LAD: She continues to have occasional mild to moderate midsternal chest discomfort with exertion and stress.  She is currently on isosorbide mononitrate 15 mg daily and also on aspirin, atenolol and pravastatin.    3. Hypertension: Blood pressure today is 120/64 and heart rate 62.    Plan:  1.  Will continue on same dose of furosemide 20 mg daily given stable with HF symptoms.  Reinforced low-sodium diet, daily weight, and stay active as tolerated.    2.  We discussed about up titrating her isosorbide which she agreed. Increased isosorbide mononitrate from 15 mg to 30 mg daily.  Instructed to call if no improvement in shortness of breath and chest pain or experiencing lightheaded or dizziness.  If she feels improvement with her symptoms on higher dose of isosorbide, will consider stopping her atenolol if she gets symptomatic with low blood pressure. Dr. Rene has recommended stress test if prog isressive chest pain.    Follow up with Dr. Rene in September as scheduled and Oscar in 3 months     History of Present Illness     Ms. Isamar Little is a 89 y.o. female with a significant past medical history of  coronary disease with status post PCI to diagonal and circumflex (2010)and known 100% LAD stenosis, SSS s/p pacemaker(May 2017), chronic lower extremity edema, and chronic diastolic heart failure who is seen at [facility] heart failure clinic today for continued follow-up per Dr. Rene. Patient was recently seen by Dr. Rene on 6/21/2018 and was noted  patient being concerned about her lower extremity edema.  She always had chronic lower extremity edema with (Rti>Lt).  Her recent BNP level was 99.  She was started on a low dose of furosemide.  Her most recent echocardiogram done in May 2017 showed an EF of 63% with grade 1 mild diastolic dysfunction, mild to moderate tricuspid and mild to moderate mitral regurgitation. Most recent stress test in May 2017 showed medium-sized area of infarct in the anterior, apical and anteroseptal segment of the LV but no evidence of inducible ischemia.    Patient presented to the heart failure clinic accompanied by her friend.  She continues to remain stable with her heart failure symptoms since diuretic dose was reduced recently except her weight is 2 pounds up above baseline.She denies fatigue, lightheadedness, shortness of breath, orthopnea, PND, palpitations and abdominal fullness/bloating.  She continues to have occasional mild to moderate midsternal chest discomfort with exertion and stress which lasts for 10-15 minutes.  She has declined taking higher dose of isosorbide in the past. Today, patient is willing to trial the higher dose of isosorbide to see if this will help resolve her chest discomfort.    She is monitoring home weights which are stable between 133-135 pounds.  She is following a low sodium diet.  Patient does not exercise on a regular basis but she stays active and walks around in the hallway in her apartment for about 5-10 minutes at a time without having symptoms.       Physical Examination Review of Systems   Vitals:    08/22/18 1258   BP: 120/64    Pulse: 62   Resp: 16     Body mass index is 29.08 kg/(m^2).  Wt Readings from Last 3 Encounters:   08/22/18 144 lb (65.3 kg)   07/25/18 145 lb (65.8 kg)   07/11/18 145 lb (65.8 kg)       General Appearance:     Alert, cooperative and in no acute distress.   ENT/Mouth: membranes moist, no oral lesions or bleeding gums.      EYES:  no scleral icterus, normal conjunctivae   Neck: no carotid bruits or thyromegaly   Chest/Lungs:   lungs are clear to auscultation, no rales or wheezing, respirations unlabored   Cardiovascular:    Heart rate regular. Normal first and second heart sounds with no murmurs, rubs, or gallops; the carotid, radial and posterior tibial pulses are intact, Jugular venous pressure flat  with no HJR,  1-2edema bilateral lower extremities    Abdomen:  Large but soft, nontender, nondistended, bowel sounds present   Extremities: no cyanosis or clubbing   Skin: warm, dry.    Neurologic: mood and affect are appropriate, alert and oriented x3      General: WNL  Eyes: WNL  Ears/Nose/Throat: Hearing Loss  Lungs: Shortness of Breath  Heart: Leg Swelling  Stomach: WNL  Bladder: WNL  Muscle/Joints: WNL  Skin: Rash  Nervous System: WNL  Mental Health: WNL     Blood: WNL     Medical History  Surgical History Family History Social History   Past Medical History:   Diagnosis Date   ? Anxiety state, unspecified    ? Bereavement, uncomplicated    ? Coronary atherosclerosis of native coronary artery    ? Depressive disorder, not elsewhere classified    ? Edema    ? Essential hypertension, benign    ? Other and unspecified hyperlipidemia    ? Other malaise and fatigue    ? Skin cancer     Past Surgical History:   Procedure Laterality Date   ? ANGIOPLASTY  6/2008   ? CAPSULOTOMY Left 1/2014   ? CATARACT EXTRACTION, BILATERAL  1/2010   ? DILATION AND CURETTAGE OF UTERUS      X3   ? TONSILLECTOMY AND ADENOIDECTOMY      Family History   Problem Relation Age of Onset   ? Hypertension Mother    ? Hypertension Father    ?  Stroke Father    ? Kidney disease Maternal Grandmother    ? Kidney disease Maternal Grandfather    ? Early death Maternal Grandfather    ? Heart disease Maternal Grandfather    ? Kidney disease Paternal Grandmother    ? Kidney disease Paternal Grandfather    ? Early death Paternal Grandfather    ? Heart disease Paternal Grandfather     Social History     Social History   ? Marital status:      Spouse name: N/A   ? Number of children: N/A   ? Years of education: N/A     Occupational History   ? Not on file.     Social History Main Topics   ? Smoking status: Never Smoker   ? Smokeless tobacco: Never Used   ? Alcohol use No   ? Drug use: No   ? Sexual activity: Not on file     Other Topics Concern   ? Not on file     Social History Narrative          Medications  Allergies   Current Outpatient Prescriptions   Medication Sig Dispense Refill   ? acetaminophen (TYLENOL) 500 MG tablet Take 500-1,000 mg by mouth every 6 (six) hours as needed for pain.      ? aspirin 81 MG EC tablet Take 81 mg by mouth at bedtime.      ? atenolol (TENORMIN) 25 MG tablet Take 1 tablet (25 mg total) by mouth daily. (Patient taking differently: Take 12.5 mg by mouth daily. ) 30 tablet 3   ? calcium-vitamin D (CALCIUM-VITAMIN D) 500 mg(1,250mg) -200 unit per tablet Take 1 tablet by mouth daily.     ? furosemide (LASIX) 20 MG tablet Take 1 tablet (20 mg total) by mouth daily. 90 tablet 3   ? multivitamin therapeutic (THERAGRAN) tablet Take 1 tablet by mouth daily.     ? naproxen sodium (ALEVE) 220 MG tablet Take 220 mg by mouth every 12 (twelve) hours as needed for pain.      ? OMEGA-3/DHA/EPA/FISH OIL (FISH OIL-OMEGA-3 FATTY ACIDS) 300-1,000 mg capsule Take 2 g by mouth daily.     ? omeprazole (PRILOSEC) 20 MG capsule Take 20 mg by mouth daily as needed. Take when using NSAIDS (ibuprofen, naproxen, etc)     ? PRAMOXINE/MENTHOL/PETROLATUM (SARNA ULTRA TOP) Apply topically 2 (two) times a day.     ? pravastatin (PRAVACHOL) 40 MG tablet Take  40 mg by mouth bedtime.     ? sertraline (ZOLOFT) 50 MG tablet Take 50 mg by mouth daily.      ? vitamin A-vitamin C-vit E-min (OCUVITE) Tab tablet Take 1 tablet by mouth daily.     ? BIOTIN ORAL Take 1 capsule by mouth daily.     ? isosorbide mononitrate (IMDUR) 30 MG 24 hr tablet Take 1 tablet (30 mg total) by mouth daily. 30 tablet 11   ? mirtazapine (REMERON) 15 MG tablet Take 15 mg by mouth at bedtime.       No current facility-administered medications for this visit.       No Known Allergies      Lab Results    Chemistry CBC BNP   Lab Results   Component Value Date    CREATININE 0.75 07/25/2018    BUN 16 07/25/2018     07/25/2018    K 4.3 07/25/2018     07/25/2018    CO2 26 07/25/2018     Creatinine (mg/dL)   Date Value   07/25/2018 0.75   06/28/2018 0.78   06/21/2018 0.73   10/16/2017 0.75    Lab Results   Component Value Date    WBC 6.8 05/15/2017    HGB 14.0 05/15/2017    HCT 41.2 05/15/2017    MCV 95 05/15/2017     05/16/2017    Lab Results   Component Value Date    BNP 99 06/21/2018     BNP (pg/mL)   Date Value   06/21/2018 99   02/08/2017 63   10/15/2013 86        Oscar Woo CNP  [facility]   Heart Failure Clinic           This note has been dictated using voice recognition software. Any grammatical, typographical, or context distortions are unintentional and inherent to the software

## 2021-06-27 NOTE — PROGRESS NOTES
Progress Notes by Rufino Rene MD at 1/14/2019 10:50 AM     Author: Rufino Rene MD Service: -- Author Type: Physician    Filed: 1/14/2019 11:23 AM Encounter Date: 1/14/2019 Status: Signed    : Rufino Rene MD (Physician)       [facility] Progress Note    Assessment:  1.  Coronary artery disease.  She has not had significant anginal chest discomfort and we will continue to monitor.  Prior angiography and more recent stress testing is reviewed.    2.  Heart failure with preserved ejection fraction.  Functional class II and appears well compensated on today's examination.    3.  Hypotension.  Blood pressure was mildly low per the nursing staff but improved during my examination.  She has not describing dizziness or lightheadedness.  She has had her dose of atenolol decreased to 12-1/2 mg daily.    4.  Sick sinus syndrome.  Normal pacemaker function based on most recent pacemaker interrogation January 2019.    5.  Dyslipidemia.  She tells me she is taking her pravastatin more regularly than when we previously discussed.  We talked about follow-up lipid panel which she would like to wait until the spring and therefore did not change her medications.      Plan: As outlined above with follow-up in 5-6 months.    1. SSS (sick sinus syndrome) (H)     2. Cardiac pacemaker, dual, in situ     3. Hyperlipidemia with target LDL less than 70           An After Visit Summary was printed and given to the patient.    Subjective:    Isamar Little is a 89 y.o. female who returned for a planned  follow up visit.  She reports feeling well.  She has not had any complaints of chest discomfort, significant shortness of breath, orthopnea or PND.  She has not utilized any nitroglycerin.  He does tell me she is contemplating whether she is going to do anything about skin cancer that she has on her face.  She has some chronic lower extremity edema which is worse on the right leg than the left.  She  states that this is better in the morning hours and we talked about keeping her legs elevated.    She has a history of known coronary artery disease with coronary angiography in 2010 that revealed significant disease in the diagonal and circumflex that was stented at that time.  She has 100% stenosis of the LAD.  The LAD was not intervened upon.  Recent stress test demonstrated a small area of ischemia in the inferior septal segment and a medium-sized area of transmural infarction and apical anteroseptal segment.  Action fraction normal.  This was felt to be an overall stable finding compared to known coronary anatomy medical management has been pursued.  Review of Systems:   General: WNL  Eyes: WNL  Ears/Nose/Throat: WNL  Lungs: WNL  Heart: Leg Swelling  Stomach: WNL  Bladder: WNL  Muscle/Joints: Joint Pain  Skin: Rash  Nervous System: Daytime Sleepiness  Mental Health: WNL     Blood: WNL      Problem List:    Patient Active Problem List   Diagnosis   ? Essential hypertension with goal blood pressure less than 140/90   ? Hyperlipidemia with target LDL less than 70   ? Depressive disorder, not elsewhere classified   ? Skin cancer   ? Coronary artery disease due to lipid rich plaque   ? Paroxysmal atrial fibrillation (H)   ? SSS (sick sinus syndrome) (H)   ? Cardiac pacemaker, dual, in situ   ? Chronic diastolic congestive heart failure (H)       Social History     Socioeconomic History   ? Marital status:      Spouse name: Not on file   ? Number of children: Not on file   ? Years of education: Not on file   ? Highest education level: Not on file   Social Needs   ? Financial resource strain: Not on file   ? Food insecurity - worry: Not on file   ? Food insecurity - inability: Not on file   ? Transportation needs - medical: Not on file   ? Transportation needs - non-medical: Not on file   Occupational History   ? Not on file   Tobacco Use   ? Smoking status: Never Smoker   ? Smokeless tobacco: Never Used    Substance and Sexual Activity   ? Alcohol use: No   ? Drug use: No   ? Sexual activity: Not on file   Other Topics Concern   ? Not on file   Social History Narrative   ? Not on file       Family History   Problem Relation Age of Onset   ? Hypertension Mother    ? Hypertension Father    ? Stroke Father    ? Kidney disease Maternal Grandmother    ? Kidney disease Maternal Grandfather    ? Early death Maternal Grandfather    ? Heart disease Maternal Grandfather    ? Kidney disease Paternal Grandmother    ? Kidney disease Paternal Grandfather    ? Early death Paternal Grandfather    ? Heart disease Paternal Grandfather        Current Outpatient Medications   Medication Sig Dispense Refill   ? acetaminophen (TYLENOL) 500 MG tablet Take 500-1,000 mg by mouth every 6 (six) hours as needed for pain.      ? aspirin 81 MG EC tablet Take 81 mg by mouth at bedtime.      ? atenolol (TENORMIN) 25 MG tablet Take 1 tablet (25 mg total) by mouth daily. (Patient taking differently: Take 12.5 mg by mouth daily. ) 30 tablet 3   ? calcium-vitamin D (CALCIUM-VITAMIN D) 500 mg(1,250mg) -200 unit per tablet Take 1 tablet by mouth daily.     ? furosemide (LASIX) 20 MG tablet Take 1 tablet (20 mg total) by mouth daily. 90 tablet 3   ? isosorbide mononitrate (IMDUR) 30 MG 24 hr tablet Take 1 tablet (30 mg total) by mouth daily. 30 tablet 11   ? mirtazapine (REMERON) 15 MG tablet Take 15 mg by mouth at bedtime.     ? multivitamin therapeutic (THERAGRAN) tablet Take 1 tablet by mouth daily.     ? nitroglycerin (NITROSTAT) 0.4 MG SL tablet Place 1 tablet (0.4 mg total) under the tongue every 5 (five) minutes as needed for chest pain. 1 Bottle 2   ? OMEGA-3/DHA/EPA/FISH OIL (FISH OIL-OMEGA-3 FATTY ACIDS) 300-1,000 mg capsule Take 2 g by mouth daily.     ? pravastatin (PRAVACHOL) 40 MG tablet Take 20 mg by mouth at bedtime .           ? sertraline (ZOLOFT) 50 MG tablet Take 50 mg by mouth daily.      ? vitamin A-vitamin C-vit E-min (OCUVITE) Tab  "tablet Take 1 tablet by mouth daily.     ? BIOTIN ORAL Take 1 capsule by mouth daily.     ? naproxen sodium (ALEVE) 220 MG tablet Take 220 mg by mouth every 12 (twelve) hours as needed for pain.      ? omeprazole (PRILOSEC) 20 MG capsule Take 20 mg by mouth daily as needed. Take when using NSAIDS (ibuprofen, naproxen, etc)     ? PRAMOXINE/MENTHOL/PETROLATUM (SARNA ULTRA TOP) Apply topically 2 (two) times a day.       No current facility-administered medications for this visit.        Objective:     BP (!) 88/52 (Patient Site: Left Arm, Patient Position: Sitting, Cuff Size: Adult Regular)   Pulse 72   Resp 16   Ht 4' 11\" (1.499 m)   Wt 143 lb (64.9 kg)   BMI 28.88 kg/m    143 lb (64.9 kg)     106/60    Physical Exam:    GENERAL APPEARANCE: alert, no apparent distress  HEENT: no scleral icterus or xanthelasma  NECK: jugular venous pressure within normal limits  CHEST: symmetric, the lungs are clear to auscultation, few crackles at the bases  CARDIOVASCULAR: regular rhythm with soft systolic murmur, S4; no carotid bruits  Abdomen: No Organomegaly, masses, bruits, or tenderness. Bowels sounds are present      EXTREMITIES: no cyanosis, clubbing, venous stasis changes 1+ edema right greater than left.    Cardiac Testing:  EKG Scan       Scan on: 10/17/18 9:32 AM by:    Indications     CAD (coronary artery disease)   Conclusion       The pharmacologic nuclear stress test is abnormal.    There is a small area of ischemia in the inferoseptal segment(s) of the left ventricle. There is a medium sized area of transmural infarction in the apical and anteroseptal segment(s) of the left ventricle.    The left ventricular ejection fraction is 70%.    When compared to the images of 5/16/2017, on review of prior images there was a small area of inferoseptal ischemia. No change when directly compared to current study images.        Exam Details     Performed Procedure Technologist Supporting Staff Performing Physician   Echo " Complete Murray, Sharla, RDCS        Appointment Date/Status Modality Department    5/15/2017     Completed  ECHO INPAT  CARDIAC TESTING       Begin Exam End Exam     5/15/2017 11:13 AM 5/15/2017 11:50 AM        Indications     Syncope   Summary       1.Left ventricle ejection fraction is normal. The calculated left ventricular ejection fraction is 63%.    2.Mild to moderate tricuspid and mild to moderate mitral regurgitation.    3.When compared to the report of the previous study dated 7/1/2008, there are changes noted. Images unavailable but apparently mild to moderate tricuspid and mitral regurgitations are new as is distal septal wall motion abnormality.         Lab Results:    Lab Results   Component Value Date     07/25/2018    K 4.3 07/25/2018     07/25/2018    CO2 26 07/25/2018    BUN 16 07/25/2018    CREATININE 0.75 07/25/2018    CALCIUM 9.8 07/25/2018     Lab Results   Component Value Date    CHOL 177 10/01/2018    TRIG 114 10/01/2018    HDL 49 (L) 10/01/2018     BNP (pg/mL)   Date Value   06/21/2018 99   02/08/2017 63   10/15/2013 86     Creatinine (mg/dL)   Date Value   07/25/2018 0.75   06/28/2018 0.78   06/21/2018 0.73   10/16/2017 0.75     LDL Calculated (mg/dL)   Date Value   10/01/2018 105   10/16/2017 115   10/11/2010 93     Lab Results   Component Value Date    WBC 6.8 05/15/2017    HGB 14.0 05/15/2017    HCT 41.2 05/15/2017    MCV 95 05/15/2017     05/16/2017               This note has been dictated using voice recognition software. Any grammatical or context distortions are unintentional and inherent to the software.      Rufino Rene M.D., F.A.C.C.  [facility]  [phone]

## 2021-06-27 NOTE — PROGRESS NOTES
Progress Notes by Rufino Rene MD at 8/1/2019 11:10 AM     Author: Rufino Rene MD Service: -- Author Type: Physician    Filed: 8/1/2019 11:40 AM Encounter Date: 8/1/2019 Status: Signed    : Rufino Rene MD (Physician)       [facility] Progress Note    Assessment:  1.  Coronary artery disease.  Stable symptoms.  She does not utilize nitroglycerin and will continue to monitor for symptoms.  I did renew both her isosorbide mononitrate and sublingual nitroglycerin.    2.  Heart failure with preserved ejection fraction.  Functional class II continues to be compensated on this examination.    3.  Dyslipidemia.  She has not had lipids rechecked since October where her LDL was suboptimal at 105.  At that time she had not been taking the pravastatin on a regular basis but indicates that she is been taking it albeit with a half a tablet.  We spent some time discussing cholesterol management in the setting of her coronary artery disease and after discussion we are going to plan our follow-up lipid panel in the near future.    4.  Right lower extremity lymphedema.  Chronic.  She is interested in being seen in the lymphedema clinic and therefore I have made a referral.      Plan: As outlined above with follow-up in 6 months.    1. Lymphedema of lower extremity, unspecified laterality  Ambulatory referral to Lymphedema Care   2. Coronary artery disease due to lipid rich plaque  isosorbide mononitrate (IMDUR) 30 MG 24 hr tablet    Lipid Profile    AST    ALT   3. CAD (coronary artery disease)  nitroglycerin (NITROSTAT) 0.4 MG SL tablet         An After Visit Summary was printed and given to the patient.    Subjective:    Isamar Little is a 89 y.o. female who returned for a planned  follow up visit.  She is here for routine pacemaker check and follow-up of coronary artery disease.  She tells me she is been feeling well.  She is had no complaints of chest pain, shortness of breath, use of  nitroglycerin, dizziness or lightheadedness.  She questioned whether she should stay on isosorbide and after discussion we decided to continue this medication.  She does have some chronic lymphedema of her right lower extremity and currently is interested in being further evaluated and is in agreement with making a referral to the lymphedema clinic.    She has a history of known coronary artery disease with angiography in 2010 and intervention to a diagonal and circumflex with stents at that time.  She has a known 100% lady occlusion.  Stress test did demonstrate a small area of ischemia in the inferior septal segment and a medium-sized area of transmural infarction in the apical anterior segment.  Left ventricle ejection fraction was normal.  Given her known anatomy and for ongoing medical management were pursued.    Review of Systems:   General: WNL  Eyes: WNL  Ears/Nose/Throat: Hearing Loss  Lungs: WNL  Heart: Leg Swelling  Stomach: WNL  Bladder: Frequent Urination at Night  Muscle/Joints: Joint Pain  Skin: Rash  Nervous System: Daytime Sleepiness  Mental Health: WNL     Blood: WNL      Problem List:    Patient Active Problem List   Diagnosis   ? Essential hypertension with goal blood pressure less than 140/90   ? Anxiety   ? Hyperlipidemia with target LDL less than 70   ? Adjustment disorder with mixed anxiety and depressed mood   ? Skin cancer   ? Coronary artery disease due to lipid rich plaque   ? Paroxysmal atrial fibrillation (H)   ? SSS (sick sinus syndrome) (H)   ? Cardiac pacemaker, dual, in situ   ? Chronic diastolic congestive heart failure (H)   ? Closed fracture of tooth with routine healing       Social History     Socioeconomic History   ? Marital status:      Spouse name: Not on file   ? Number of children: Not on file   ? Years of education: Not on file   ? Highest education level: Not on file   Occupational History   ? Not on file   Social Needs   ? Financial resource strain: Not on file    ? Food insecurity:     Worry: Not on file     Inability: Not on file   ? Transportation needs:     Medical: Not on file     Non-medical: Not on file   Tobacco Use   ? Smoking status: Never Smoker   ? Smokeless tobacco: Never Used   Substance and Sexual Activity   ? Alcohol use: No   ? Drug use: No   ? Sexual activity: Not Currently     Partners: Male     Birth control/protection: Post-menopausal   Lifestyle   ? Physical activity:     Days per week: Not on file     Minutes per session: Not on file   ? Stress: Not on file   Relationships   ? Social connections:     Talks on phone: Not on file     Gets together: Not on file     Attends Sabianist service: Not on file     Active member of club or organization: Not on file     Attends meetings of clubs or organizations: Not on file     Relationship status: Not on file   ? Intimate partner violence:     Fear of current or ex partner: Not on file     Emotionally abused: Not on file     Physically abused: Not on file     Forced sexual activity: Not on file   Other Topics Concern   ? Not on file   Social History Narrative   ? Not on file       Family History   Problem Relation Age of Onset   ? Hypertension Mother    ? Hypertension Father    ? Stroke Father    ? Kidney disease Maternal Grandmother    ? Kidney disease Maternal Grandfather    ? Early death Maternal Grandfather    ? Heart disease Maternal Grandfather    ? Kidney disease Paternal Grandmother    ? Kidney disease Paternal Grandfather    ? Early death Paternal Grandfather    ? Heart disease Paternal Grandfather    ? Heart attack Sister    ? Heart attack Brother    ? Dementia Brother         disabled vet       Current Outpatient Medications   Medication Sig Dispense Refill   ? acetaminophen (TYLENOL) 500 MG tablet Take 500-1,000 mg by mouth every 6 (six) hours as needed for pain.      ? aspirin 81 MG EC tablet Take 81 mg by mouth at bedtime.      ? atenolol (TENORMIN) 25 MG tablet Take 1 tablet (25 mg total) by  "mouth daily. (Patient taking differently: Take 12.5 mg by mouth daily. ) 30 tablet 3   ? calcium-vitamin D (CALCIUM-VITAMIN D) 500 mg(1,250mg) -200 unit per tablet Take 1 tablet by mouth daily.     ? furosemide (LASIX) 20 MG tablet Take 1 tablet (20 mg total) by mouth daily. 90 tablet 3   ? isosorbide mononitrate (IMDUR) 30 MG 24 hr tablet Take 1 tablet (30 mg total) by mouth daily. 90 tablet 11   ? multivitamin therapeutic (THERAGRAN) tablet Take 1 tablet by mouth daily.     ? nitroglycerin (NITROSTAT) 0.4 MG SL tablet Place 1 tablet (0.4 mg total) under the tongue every 5 (five) minutes as needed for chest pain. 1 Bottle 2   ? OMEGA-3/DHA/EPA/FISH OIL (FISH OIL-OMEGA-3 FATTY ACIDS) 300-1,000 mg capsule Take 2 g by mouth daily.     ? omeprazole (PRILOSEC) 20 MG capsule Take 20 mg by mouth daily as needed. Take when using NSAIDS (ibuprofen, naproxen, etc)     ? peg 400-propylene glycol PF (SYSTANE) 0.4-0.3 % Dpet Administer 1 drop to both eyes 2 (two) times a day as needed.     ? PRAMOXINE/MENTHOL/PETROLATUM (SARNA ULTRA TOP) Apply 1 application topically daily as needed.            ? pravastatin (PRAVACHOL) 40 MG tablet Take 20 mg by mouth at bedtime .           ? sertraline (ZOLOFT) 50 MG tablet Take 1 tablet (50 mg total) by mouth daily. 90 tablet 1   ? BIOTIN ORAL Take 1 capsule by mouth daily.     ? mirtazapine (REMERON) 15 MG tablet Take 15 mg by mouth at bedtime as needed.            ? naproxen sodium (ALEVE) 220 MG tablet Take 220 mg by mouth every 12 (twelve) hours as needed for pain.        No current facility-administered medications for this visit.        Objective:     /68 (Patient Site: Left Arm, Patient Position: Sitting, Cuff Size: Adult Large)   Pulse 79   Ht 4' 11\" (1.499 m)   Wt 140 lb (63.5 kg)   BMI 28.28 kg/m    140 lb (63.5 kg)   140 3 January 2019    Physical Exam:    GENERAL APPEARANCE: alert, no apparent distress  HEENT: no scleral icterus or xanthelasma  NECK: jugular venous " pressure within normal limits  CHEST: symmetric, the lungs are clear to auscultation, pacemaker site well-healed  CARDIOVASCULAR: regular rhythm without murmur, click, or gallop; no carotid bruits  Abdomen: No Organomegaly, masses, bruits, or tenderness. Bowels sounds are present      EXTREMITIES: no cyanosis, clubbing, chronic enlargement of the right lower extremity compared to left lower extremity with mild skin irritation.    Cardiac Testing:  Echo Complete   Order# [id number]   Reading physician: Megha Mcneal MD Ordering physician: Rufino Rene MD Study date: 5/15/17   Performing Date Performing Department   May 15, 2017  CARDIAC TESTING [[id number]]   Patient Information     Patient Name  Isamar Little MRN  [id number] Sex  Female              Age  1928 (89 y.o.)   Indications     Syncope   Summary       1.Left ventricle ejection fraction is normal. The calculated left ventricular ejection fraction is 63%.    2.Mild to moderate tricuspid and mild to moderate mitral regurgitation.    3.When compared to the report of the previous study dated 2008, there are changes noted. Images unavailable but apparently mild to moderate tricuspid and mitral regurgitations are new as is distal septal wall motion abnormality       NM Pharmacologic Stress Test   Order# [id number]   Reading physician: Brock Ramirez DO Ordering physician: Rufino Rene MD Study date: 10/17/18   Patient Information     Patient Name  Isamar Little MRN  [id number] Sex  Female              Age  1928 (89 y.o.)   EKG Scan       Scan on: 10/17/18 9:32 AM by:    Indications     CAD (coronary artery disease)   Conclusion       The pharmacologic nuclear stress test is abnormal.    There is a small area of ischemia in the inferoseptal segment(s) of the left ventricle. There is a medium sized area of transmural infarction in the apical and anteroseptal segment(s) of the left ventricle.    The left ventricular  ejection fraction is 70%.    When compared to the images of 5/16/2017, on review of prior images there was a small area of inferoseptal ischemia. No change when directly compared to current study images.            Lab Results:    Lab Results   Component Value Date     04/18/2019    K 3.5 04/18/2019     04/18/2019    CO2 24 04/18/2019    BUN 14 04/18/2019    CREATININE 0.83 04/18/2019    CALCIUM 9.1 04/18/2019     Lab Results   Component Value Date    CHOL 177 10/01/2018    TRIG 114 10/01/2018    HDL 49 (L) 10/01/2018     BNP (pg/mL)   Date Value   04/18/2019 109   06/21/2018 99   02/08/2017 63     Creatinine (mg/dL)   Date Value   04/18/2019 0.83   07/25/2018 0.75   06/28/2018 0.78   06/21/2018 0.73     LDL Calculated (mg/dL)   Date Value   10/01/2018 105   10/16/2017 115   10/11/2010 93     Lab Results   Component Value Date    WBC 7.3 04/18/2019    HGB 14.1 04/18/2019    HCT 42.0 04/18/2019    MCV 97 04/18/2019     04/18/2019               This note has been dictated using voice recognition software. Any grammatical or context distortions are unintentional and inherent to the software.      Rufino Rene M.D., F.A.C.C.  [facility]  [phone]

## 2021-06-29 NOTE — PROGRESS NOTES
"Progress Notes by Rufino Rene MD at 6/10/2020  1:30 PM     Author: Rufino Rene MD Service: -- Author Type: Physician    Filed: 6/10/2020  2:16 PM Encounter Date: 6/10/2020 Status: Signed    : Rufino Rene MD (Physician)           The patient has been notified of following:     \"This video visit will be conducted via a call between you and your physician/provider. We have found that certain health care needs can be provided without the need for an in-person physical exam.  This service lets us provide the care you need with a video conversation.  If a prescription is necessary we can send it directly to your pharmacy.  If lab work is needed we can place an order for that and you can then stop by our lab to have the test done at a later time.      Patient has given verbal consent to a Video visit? Yes    HEART CARE VIDEO ENCOUNTER        The patient has chosen to have the visit conducted as a video visit, to reduce risk of exposure given the current status of Coronavirus in our community. This video visit is being conducted via a call between the patient and physician/provider. Health care needs are being provided without a physical exam.     Assessment/Recommendations   Assessment/Plan:  1.  Coronary artery disease.  No complaints of anginal chest discomfort or use of nitroglycerin.  She will continue to monitor for symptoms.  She had prior coronary intervention in 2010 to the diagonal and circumflex with stents at that time.  She has a known chronic LAD occlusion.  Nuclear stress test October 2018 demonstrated a small area of inferior septal ischemia with a medium-sized area of transmural infarction in the apical and anterior septal segment with normal ejection fraction.  She will continue with the current combination of medications and monitor for any symptoms.  2.  Heart failure with preserved ejection fraction.  She has chronic lymphedema but no specific complaints of heart failure symptoms.  " We talked about monitoring her salt in her diet and monitoring her weights.  3.  Sick sinus syndrome.  The most recent pacemaker interrogation April 2020 were pacemaker function was normal and no significant arrhythmias reported.  4.  Dyslipidemia.  She had previously not been taking pravastatin but states that she has been taking it now more regularly.  Previously I discussed lustral management.  Her last LDL cholesterol was 91 September 2019.  I elected not to make changes over the video conferencing online given her advanced age and some diminished hearing acuity and we can discuss further in follow-up in a few months.  5.  Hypertension.  She reports that her blood pressure today was 138/94.  She was going to get some additional blood pressure readings over the next month with a help of her neighbor.  Follow Up Plan: 4 months  I have reviewed the note as documented.  This accurately captures the substance of my conversation with the patient.    Total time of video between patient and provider was 12 minutes   Start Time: 242  Stop Time: 254    Originating Location (pt. Location): Home    Distant Location (provider location):  [facility]      Mode of Communication:  Video Conference via doxty       History of Present Illness/Subjective    Isamar Little is a 89 y.o. female coronary artery disease with remote coronary intervention, pacemaker and chronic lower extremity edema.  Who is being evaluated via a billable video visit and has consented to a video visit. Isamar Little has a history of any artery disease with remote coronary intervention, sick sinus syndrome with pacemaker implantation and chronic lymphedema.  She reports that she is been feeling well.  She denies particular complaints of chest pain, shortness of breath, dizziness, orthopnea or PND.  She has chronic lower extremity edema she states is stable.  I have previously referred her to the lymphedema clinic but she did not call  receiving a phone call.  We will place a repeat consultation.    Echo Complete   Order# [id number]   Reading physician: Megha Mcneal MD  Ordering physician: Rufino Rene MD  Study date: 5/15/17    Performing Date  Performing Department    May 15, 2017  [facility] CARDIAC TESTING [[id number]]    Patient Information     Patient Name   Isamar Little  MRN   [id number]  Sex   Female   1   1928 (89 y.o.)    Indications     Syncope    Summary       1.Left ventricle ejection fraction is normal. The calculated left ventricular ejection fraction is 63%.    2.Mild to moderate tricuspid and mild to moderate mitral regurgitation.    3.When compared to the report of the previous study dated 2008, there are changes noted. Images unavailable but apparently mild to moderate tricuspid and mitral regurgitations are new as is distal septal wall motion abnormality.        [facility] Pharmacologic Stress Test   Order# [id number]   Reading physician: Brock Ramirez DO  Ordering physician: Rufino Rene MD  Study date: 10/17/18    Patient Information     Patient Name   Isamar Little  MRN   [id number]  Sex   Female   1   1928 (89 y.o.)    EKG Scan      Stress Test Data - Scan on 10/17/18 9:32 AM by     Indications     CAD (coronary artery disease)    Conclusion       The pharmacologic nuclear stress test is abnormal.    There is a small area of ischemia in the inferoseptal segment(s) of the left ventricle. There is a medium sized area of transmural infarction in the apical and anteroseptal segment(s) of the left ventricle.    The left ventricular ejection fraction is 70%.    When compared to the images of 2017, on review of prior images there was a small area of inferoseptal ischemia. No change when directly compared to current study images.          I have reviewed and updated the patient's Past Medical History, Social History, Family History and Medication List.     Physical Examination performed via  live video encounter Review of Systems   General Appearance:   no distress, normal body habitus, upright.   ENT/Mouth: membranes moist, no nasal discharge or bleeding gums.  Normal head shape, no evidence of injury or laceration.     EYES:  no scleral icterus, normal conjunctivae, eyeglasses   Neck: no evidence of thyromegaly.  Supple   Chest/Lungs:   No audible wheezing equal chest wall expansion. Non labored breathing.  No cough.   Cardiovascular:      Abdomen:     Extremities: no cyanosis or clubbing noted.  Chronic edema right greater than left.  Small eschar on the right lower extremity.   Skin: no xanthelasma, normal skin color. No evidence of facial lacerations.      Neurologic: Normal arm motion bilateral, no tremors.  No evidence of focal defect.       Psychiatric: alert and oriented x3, calm                                               Medical History  Surgical History Family History Social History   Past Medical History:   Diagnosis Date   ? Anxiety state, unspecified    ? Bereavement, uncomplicated    ? Coronary atherosclerosis of native coronary artery    ? Depressive disorder, not elsewhere classified    ? Edema    ? Essential hypertension, benign    ? Other and unspecified hyperlipidemia    ? Other malaise and fatigue    ? Skin cancer     Past Surgical History:   Procedure Laterality Date   ? ANGIOPLASTY  06/2008    stents x3   ? CAPSULOTOMY Left 1/2014   ? CARDIAC PACEMAKER PLACEMENT     ? CATARACT EXTRACTION, BILATERAL  1/2010   ? DILATION AND CURETTAGE OF UTERUS      X3   ? TONSILLECTOMY AND ADENOIDECTOMY      Family History   Problem Relation Age of Onset   ? Hypertension Mother    ? Hypertension Father    ? Stroke Father    ? Kidney disease Maternal Grandmother    ? Kidney disease Maternal Grandfather    ? Early death Maternal Grandfather    ? Heart disease Maternal Grandfather    ? Kidney disease Paternal Grandmother    ? Kidney disease Paternal Grandfather    ? Early death Paternal Grandfather     ? Heart disease Paternal Grandfather    ? Heart attack Sister    ? Heart attack Brother    ? Dementia Brother         disabled vet      Social History     Socioeconomic History   ? Marital status:      Spouse name: Not on file   ? Number of children: Not on file   ? Years of education: Not on file   ? Highest education level: Not on file   Occupational History   ? Not on file   Social Needs   ? Financial resource strain: Not on file   ? Food insecurity     Worry: Not on file     Inability: Not on file   ? Transportation needs     Medical: Not on file     Non-medical: Not on file   Tobacco Use   ? Smoking status: Never Smoker   ? Smokeless tobacco: Never Used   Substance and Sexual Activity   ? Alcohol use: No   ? Drug use: No   ? Sexual activity: Not Currently     Partners: Male     Birth control/protection: Post-menopausal   Lifestyle   ? Physical activity     Days per week: Not on file     Minutes per session: Not on file   ? Stress: Not on file   Relationships   ? Social connections     Talks on phone: Not on file     Gets together: Not on file     Attends Mu-ism service: Not on file     Active member of club or organization: Not on file     Attends meetings of clubs or organizations: Not on file     Relationship status: Not on file   ? Intimate partner violence     Fear of current or ex partner: Not on file     Emotionally abused: Not on file     Physically abused: Not on file     Forced sexual activity: Not on file   Other Topics Concern   ? Not on file   Social History Narrative   ? Not on file          Medications  Allergies   Current Outpatient Medications   Medication Sig Dispense Refill   ? acetaminophen (TYLENOL) 500 MG tablet Take 500-1,000 mg by mouth every 6 (six) hours as needed for pain.      ? aspirin 81 MG EC tablet Take 81 mg by mouth at bedtime.      ? atenolol (TENORMIN) 25 MG tablet Take 1 tablet (25 mg total) by mouth daily. (Patient taking differently: Take 12.5 mg by mouth  daily. ) 30 tablet 3   ? calcium-vitamin D (CALCIUM-VITAMIN D) 500 mg(1,250mg) -200 unit per tablet Take 1 tablet by mouth daily.     ? furosemide (LASIX) 20 MG tablet Take 1 tablet (20 mg total) by mouth daily. 90 tablet 3   ? isosorbide mononitrate (IMDUR) 30 MG 24 hr tablet Take 1 tablet (30 mg total) by mouth daily. 90 tablet 11   ? multivitamin therapeutic (THERAGRAN) tablet Take 1 tablet by mouth daily.     ? naproxen sodium (ALEVE) 220 MG tablet Take 220 mg by mouth every 12 (twelve) hours as needed for pain.      ? nitroglycerin (NITROSTAT) 0.4 MG SL tablet Place 1 tablet (0.4 mg total) under the tongue every 5 (five) minutes as needed for chest pain. 1 Bottle 2   ? OMEGA-3/DHA/EPA/FISH OIL (FISH OIL-OMEGA-3 FATTY ACIDS) 300-1,000 mg capsule Take 2 g by mouth daily.     ? peg 400-propylene glycol PF (SYSTANE) 0.4-0.3 % Dpet Administer 1 drop to both eyes 2 (two) times a day as needed.     ? pravastatin (PRAVACHOL) 40 MG tablet Take 1 tablet by mouth daily.     ? sertraline (ZOLOFT) 50 MG tablet Take 1 tablet (50 mg total) by mouth daily. 90 tablet 1   ? atorvastatin (LIPITOR) 20 MG tablet Take 1 tablet (20 mg total) by mouth at bedtime. 90 tablet 0   ? BIOTIN ORAL Take 1 capsule by mouth daily.     ? mirtazapine (REMERON) 15 MG tablet Take 15 mg by mouth at bedtime as needed.            ? omeprazole (PRILOSEC) 20 MG capsule Take 20 mg by mouth daily as needed. Take when using NSAIDS (ibuprofen, naproxen, etc)     ? PRAMOXINE/MENTHOL/PETROLATUM (SARNA ULTRA TOP) Apply 1 application topically daily as needed.              No current facility-administered medications for this visit.     No Known Allergies      Lab Results    Chemistry/lipid CBC Cardiac Enzymes/BNP/TSH/INR   Lab Results   Component Value Date    CHOL 176 09/11/2019    HDL 55 09/11/2019    LDLCALC 91 09/11/2019    TRIG 149 09/11/2019    CREATININE 0.78 12/09/2019    BUN 14 12/09/2019    K 3.6 12/09/2019     12/09/2019     12/09/2019     CO2 26 12/09/2019    Lab Results   Component Value Date    WBC 7.5 12/09/2019    HGB 15.0 12/09/2019    HCT 44.3 12/09/2019    MCV 95 12/09/2019     12/09/2019    Lab Results   Component Value Date    CKMB 1 02/09/2012    TROPONINI <0.01 05/15/2017    BNP 68 12/09/2019    TSH 2.83 09/11/2019    INR 1.07 04/18/2019        Rufino Rene

## 2021-06-29 NOTE — PROGRESS NOTES
Progress Notes by Hailey Regalado MD at 8/12/2020 11:00 AM     Author: Hailey Regalado MD Service: -- Author Type: Physician    Filed: 8/12/2020 11:40 AM Encounter Date: 8/12/2020 Status: Signed    : Hailey Regalado MD (Physician)       Type of service:  Video Visit       Video Start and End Time : 10:54 am-11:31 am    Originating Location (pt. Location):  Home       Distant Location (provider location):  [facility] [place]         Mode of [facility]:  [facility]        Referred By: Dee Lopez MD    Date Of Service: 08/12/2020    Chief Complaint: Right leg swelling    History of Present Illness:    Isamar Little presents to the [facility] by video visit due to COVID-19 pandemic and higher risk, as a new consult to evaluate right leg swelling.  Her friend is with her to help with the video.  The swelling began over two years ago. It is noted she was seen in 2015 She was told to wear elastic stockings which she stopped wearing due to pain.   She has an underlying history of hypertension, hyperlipidemia, coronary artery disease, proximal atrial fibrillation with sick sinus syndrome, chronic diastolic congestive heart failure and is status post cardiac pacemaker placement.  She was sent here by Dr. Rene. There was no other associated trauma, medication change or major illness.   The swelling been stable since onset.  Pain is rated a 0 on a 10 point scale.  The swelling is improved with elevation.  The patient sleeps in a bed.  There has been no new numbness, tingling or weakness.  There have been no new masses, rashes, or swellings of any other joints. There has been no new decrease in appetite, unexplained weight loss, abdominal bloating, bowel or bladder changes and irregular bleeding.    Past Medical History:   Diagnosis Date   ? Adjustment disorder with mixed anxiety  and depressed mood    ? Anxiety     Previously on sertraline   ? Anxiety state, unspecified    ? Atherosclerotic heart disease of native coronary artery without angina pectoris 5/16/2017   ? Bereavement, uncomplicated    ? Cardiac pacemaker, dual, in situ 5/31/2017    [employer]visy[employer] MEEKS DOI: 5/17/17 Dr. David Patel    ? Chronic diastolic congestive heart failure (H) 6/28/2018   ? Chronic edema 7/13/2020   ? Closed fracture of tooth with routine healing 4/23/2019   ? Coronary atherosclerosis of native coronary artery    ? Depressive disorder, not elsewhere classified    ? Edema    ? Essential hypertension with goal blood pressure less than 140/90    ? Essential hypertension, benign    ? Gastroesophageal reflux disease 7/13/2020   ? Hyperlipidemia with target LDL less than 70    ? Osteoporosis without current pathological fracture, unspecified osteoporosis type 7/13/2020   ? Other and unspecified hyperlipidemia    ? Other malaise and fatigue    ? Paroxysmal atrial fibrillation (H)    ? Primary insomnia 7/13/2020   ? Primary osteoarthritis 7/13/2020   ? Skin cancer    ? SSS (sick sinus syndrome) (H)        Past Surgical History:   Procedure Laterality Date   ? ANGIOPLASTY  06/2008    stents x3   ? CAPSULOTOMY Left 1/2014   ? CARDIAC PACEMAKER PLACEMENT     ? CATARACT EXTRACTION, BILATERAL  1/2010   ? DILATION AND CURETTAGE OF UTERUS      X3   ? TONSILLECTOMY AND ADENOIDECTOMY           Current Outpatient Medications:   ?  acetaminophen (TYLENOL) 500 MG tablet, Take 500-1,000 mg by mouth every 6 (six) hours as needed for pain. , Disp: , Rfl:   ?  aspirin 81 MG EC tablet, Take 81 mg by mouth at bedtime. , Disp: , Rfl:   ?  atenolol (TENORMIN) 25 MG tablet, Take 1 tablet (25 mg total) by mouth daily. (Patient taking differently: Take 12.5 mg by mouth daily. ), Disp: 30 tablet, Rfl: 3  ?  atorvastatin (LIPITOR) 20 MG tablet, Take 1 tablet (20 mg total) by mouth at bedtime., Disp: 90 tablet, Rfl: 0  ?  BIOTIN ORAL,  Take 1 capsule by mouth daily., Disp: , Rfl:   ?  calcium-vitamin D (CALCIUM-VITAMIN D) 500 mg(1,250mg) -200 unit per tablet, Take 1 tablet by mouth daily., Disp: , Rfl:   ?  furosemide (LASIX) 20 MG tablet, Take 1 tablet (20 mg total) by mouth daily., Disp: 90 tablet, Rfl: 3  ?  isosorbide mononitrate (IMDUR) 30 MG 24 hr tablet, Take 1 tablet (30 mg total) by mouth daily., Disp: 90 tablet, Rfl: 11  ?  mirtazapine (REMERON) 15 MG tablet, Take 15 mg by mouth at bedtime as needed.    , Disp: , Rfl:   ?  multivitamin therapeutic (THERAGRAN) tablet, Take 1 tablet by mouth daily., Disp: , Rfl:   ?  naproxen sodium (ALEVE) 220 MG tablet, Take 220 mg by mouth every 12 (twelve) hours as needed for pain. , Disp: , Rfl:   ?  nitroglycerin (NITROSTAT) 0.4 MG SL tablet, Place 1 tablet (0.4 mg total) under the tongue every 5 (five) minutes as needed for chest pain., Disp: 1 Bottle, Rfl: 2  ?  OMEGA-3/DHA/EPA/FISH OIL (FISH OIL-OMEGA-3 FATTY ACIDS) 300-1,000 mg capsule, Take 2 g by mouth daily., Disp: , Rfl:   ?  omeprazole (PRILOSEC) 20 MG capsule, Take 20 mg by mouth daily as needed. Take when using NSAIDS (ibuprofen, naproxen, etc), Disp: , Rfl:   ?  peg 400-propylene glycol PF (SYSTANE) 0.4-0.3 % Dpet, Administer 1 drop to both eyes 2 (two) times a day as needed., Disp: , Rfl:   ?  PRAMOXINE/MENTHOL/PETROLATUM (SARNA ULTRA TOP), Apply 1 application topically daily as needed.    , Disp: , Rfl:   ?  pravastatin (PRAVACHOL) 40 MG tablet, Take 1 tablet by mouth daily., Disp: , Rfl:   ?  sertraline (ZOLOFT) 50 MG tablet, Take 1 tablet (50 mg total) by mouth daily., Disp: 90 tablet, Rfl: 1    No Known Allergies    Social History     Socioeconomic History   ? Marital status:      Spouse name: Not on file   ? Number of children: Not on file   ? Years of education: Not on file   ? Highest education level: Not on file   Occupational History   ? Not on file   Social Needs   ? Financial resource strain: Not on file   ? Food  insecurity     Worry: Not on file     Inability: Not on file   ? Transportation needs     Medical: Not on file     Non-medical: Not on file   Tobacco Use   ? Smoking status: Never Smoker   ? Smokeless tobacco: Never Used   Substance and Sexual Activity   ? Alcohol use: No   ? Drug use: No   ? Sexual activity: Not Currently     Partners: Male     Birth control/protection: Post-menopausal   Lifestyle   ? Physical activity     Days per week: Not on file     Minutes per session: Not on file   ? Stress: Not on file   Relationships   ? Social connections     Talks on phone: Not on file     Gets together: Not on file     Attends [employer] service: Not on file     Active member of club or organization: Not on file     Attends meetings of clubs or organizations: Not on file     Relationship status: Not on file   ? Intimate partner violence     Fear of current or ex partner: Not on file     Emotionally abused: Not on file     Physically abused: Not on file     Forced sexual activity: Not on file   Other Topics Concern   ? Not on file   Social History Narrative   ? Not on file       Family History   Problem Relation Age of Onset   ? Hypertension Mother    ? Hypertension Father    ? Stroke Father    ? Kidney disease Maternal Grandmother    ? Kidney disease Maternal Grandfather    ? Early death Maternal Grandfather    ? Heart disease Maternal Grandfather    ? Kidney disease Paternal Grandmother    ? Kidney disease Paternal Grandfather    ? Early death Paternal Grandfather    ? Heart disease Paternal Grandfather    ? Heart attack Sister    ? Heart attack Brother    ? Dementia Brother         disabled vet       Review of Systems:  Review of systems is otherwise negative, except as noted above.  Full 12 point review of systems was completed.    Radiology/Diagnostic Studies:    I personally reviewed the following imaging results today and those on care everywhere, if indicated    EXAM: US VENOUS LEG RIGHT  LOCATION: [facility]  Hospital  DATE/TIME: 4/18/2019 1:38 PM     INDICATION: leg swelling and pain  COMPARISON: None.  TECHNIQUE: Routine exam without and with compression, augmentation, and duplex utilizing 2D gray-scale imaging, Doppler interrogation with color-flow and spectral waveform analysis.     FINDINGS: The common femoral, femoral, popliteal, and segmentally visualized calf veins were evaluated. The opposite CFV was also included in the evaluation.     Right leg veins are negative for deep venous thrombosis. No popliteal cysts.     IMPRESSION:   CONCLUSION:  1.  Right leg veins are negative for DVT.  EXAM: CTA CHEST PE RUN  LOCATION: [facility]  DATE/TIME: 12/9/2019 9:55 AM     INDICATION: Shortness of breath.  COMPARISON: None.  TECHNIQUE: CT angiogram chest during arterial phase injection IV contrast. 2D and 3D MIP reconstructions were performed by the CT technologist. Dose reduction techniques were used.   CONTRAST: Iohexol (Omni) 100 mL     FINDINGS:  ANGIOGRAM CHEST: Pulmonary arteries are normal caliber and negative for pulmonary emboli. Thoracic aorta is negative for dissection. No CT evidence of right heart strain.     LUNGS AND PLEURA: Normal.     MEDIASTINUM/AXILLAE: Normal.     UPPER ABDOMEN: Normal.     MUSCULOSKELETAL: Normal.     IMPRESSION:   1.  Negative CTA chest.    Laboratory Studies:  I personally reviewed the following lab results today and those on care everywhere, if indicated      No results found for: SEDRATE      No results found for: CRP        Lab Results   Component Value Date    CREATININE 0.78 12/09/2019      No results found for: HGBA1C        Lab Results   Component Value Date    BUN 14 12/09/2019              Lab Results   Component Value Date    ALBUMIN 3.8 12/09/2019       Vitamin D, Total (25-Hydroxy)   Date Value Ref Range Status   09/11/2019 34.1 30.0 - 80.0 ng/mL Final       Lab Results   Component Value Date    TSH 2.83 09/11/2019     Lab Results   Component Value Date    WBC 7.5  12/09/2019    HGB 15.0 12/09/2019    HCT 44.3 12/09/2019    MCV 95 12/09/2019     12/09/2019       Physical Exam:  There were no vitals filed for this visit.  No fevers per report.    See flow chart for circumferential measures. (none available)    Vasc Edema 6/1/2015 6/8/2015   Right just above MTP 21.3  20.5   Right Ankle 28.5 27.5   Right Widest Calf 39.5 39.4   Right Thigh Up 10cm - 43.5   Left - just above MTP 20.5 20.2   Left Ankle 25 23.3   Left Widest Calf 39.5 38.8   Left Thigh Up 10cm - 43.5      Previous measures.    Friend helping with exam with patient's permission.  Instructions given by me with video.    General:  89 y.o. female in no apparent distress.      Psych: Alert and oriented x 3.  Cooperative. Affect normal.    HEENT: Atraumatic, normocephalic    Musculoskeletal:  Normal range of motion in knees and ankles bilaterally.  There is no active joint synovitis, erythema, swelling.     Neurological:  Sensation is intact to light touch bilaterally. Strength testing is normal in hip flexion, knee flexion, knee extension, ankle dorsiflexion and great toe extension bilaterally.       Vascular: Dorsalis pedis and posterior tibialis pulses are warm and not discolored. There are significant telangietasias, medial ankle venous flares, venous varicosities  and spider veins .     Integumentary: Skin of the legs is uniformly warm and dry.  Fibrosis is very difficult to evaluate under the situation but she appears to have thickened toes and skin on the right distal foot.  Nails cannot be appropriately evaluated due to video quality.  No ulcerations are noted by me or by her friend.      Impression:    1. Right leg swelling  2. Suspected right venous insufficiency and hypertension with acquired lymphedema  3. Advanced age  4. History of  coronary artery disease  5. Chronic diastolic heart failure    Plan:  1. Type of swelling was reviewed in detail with the patient and friend.  Questions were answered and  education was completed.  2. At this time with the right leg swelling which is been there for 5 years per our documentation and has not changed I do not think there is a need for aggressive imaging.  However, it would be beneficial to get the swelling better controlled to decrease complications and improve mobility.  I will ask home health care to see her and start lymphedema therapy.  It would also be nice to have somebody get in the home.  Once the swelling is down the over-the-counter compression would be very beneficial for her.  She may do best with a Velcro type compression from prism which is less expensive.  I will have my nurses work with her and her therapist on this once the swelling has had a chance to come down.  3. Based on her symptoms and her exam today, though she has underlying coronary artery disease, I will hold off on any further arterial testing of the legs.  4. Eventually it would be nice to get her in for venous insufficiency study to check status of the veins especially in the right leg.  5. Patient will follow up in 2 months by video, or when needed.  However, if the swelling has not improved or has increased I will need to bring her in to get a proper exam.  If any questions or concerns they are to contact the clinic.     Thank you very much for referring Isamar Little.  If you have any questions please feel free to contact me at [phone].    Time spent with patient 30 minutes with greater than 50% time in consultation, education and coordination of care, excluding procedures.     Hailey Regalado MD, Diplomate ABWMS, FACCWS, FAAPMR  Medical Director Wound Care and Lymphedema  [facility]  [phone]    This note was dictated using a voice recognition software.  Any grammatical or context distortion are unintentional and inherent to the software.

## 2021-06-29 NOTE — PROGRESS NOTES
Progress Notes by Hailey Regalado MD at 10/12/2020 11:45 AM     Author: Hailey Regalado MD Service: -- Author Type: Physician    Filed: 10/12/2020 12:35 PM Encounter Date: 10/12/2020 Status: Signed    : Hailey Reglaado MD (Physician)           Type of service:  Video Visit - Telehealth      Video Start and End Time : 12:24 pm-12:34 pm    Originating Location (pt. Location):  Home   Facility      Distant Location (provider location):  [facility] [place]         Mode of Communication:  DoximChangelight        Date of Service: 10/12/2020     Date last seen by Dr. Regalado:  2020    PCP: Dee Lopez MD    Impression:    1. Right leg swelling-stable  2. Suspected right venous insufficiency and hypertension with acquired lymphedema  3. Advanced age  4. History of  coronary artery disease  5. Chronic diastolic heart failure    Plan:  1. Questions were answered .  2. She wears compression during the day which helps. She is using the velcro compression.  She will continue to do so.  3. With no change in her symptoms, though she has underlying coronary artery disease, I will hold off on any further arterial or venous testing of the legs.  4. Patient will follow up in 6-8 months by video or in person if possible, or when needed.  However, if the swelling has not improved or has increased I will need to bring her in to get a proper exam.  If any questions or concerns they are to contact the clinic.       ---------------------------------------------------------------------------------------------------------------------    Chief Complaint: Right leg swelling    History of Present Illness:    Isamar Little returns to the [facility] by telehealth video visit due to COVID-19 pandemic and higher risk, for right leg swelling felt to be due to venous insufficiency/hypertension with secondary lymphedema complicated by advanced age and chronic  diastolic heart failure. I treated her with home lymphedema therapy and then velcro compression OTC.  Of note was the history of CAD, but with no significant leg symptoms it was decided to hold off on further imaging.  Her friend is with her to help with the video.  She had lymphedema therapy.  She has velcro compression now which she wears daily and these help.  There is no pain and she reports swelling is stable.  There has been no new numbness, tingling or weakness.  There have been no new masses, rashes, or swellings of any other joints. There has been no new decrease in appetite, unexplained weight loss, abdominal bloating, bowel or bladder changes and irregular bleeding.    Past Medical History:   Diagnosis Date   ? Adjustment disorder with mixed anxiety and depressed mood    ? Anxiety     Previously on sertraline   ? Anxiety state, unspecified    ? Atherosclerotic heart disease of native coronary artery without angina pectoris 5/16/2017   ? Bereavement, uncomplicated    ? Cardiac pacemaker, dual, in situ 5/31/2017    [employer] Jaspal MEEKS DOI: 5/17/17 Dr. David Patel    ? Chronic diastolic congestive heart failure (H) 6/28/2018   ? Chronic edema 7/13/2020   ? Closed fracture of tooth with routine healing 4/23/2019   ? Coronary atherosclerosis of native coronary artery    ? Depressive disorder, not elsewhere classified    ? Edema    ? Essential hypertension with goal blood pressure less than 140/90    ? Essential hypertension, benign    ? Gastroesophageal reflux disease 7/13/2020   ? Hyperlipidemia with target LDL less than 70    ? Osteoporosis without current pathological fracture, unspecified osteoporosis type 7/13/2020   ? Other and unspecified hyperlipidemia    ? Other malaise and fatigue    ? Paroxysmal atrial fibrillation (H)    ? Primary insomnia 7/13/2020   ? Primary osteoarthritis 7/13/2020   ? Skin cancer    ? SSS (sick sinus syndrome) (H)        Past Surgical History:   Procedure Laterality  Date   ? ANGIOPLASTY  06/2008    stents x3   ? CAPSULOTOMY Left 1/2014   ? CARDIAC PACEMAKER PLACEMENT     ? CATARACT EXTRACTION, BILATERAL  1/2010   ? DILATION AND CURETTAGE OF UTERUS      X3   ? TONSILLECTOMY AND ADENOIDECTOMY           Current Outpatient Medications:   ?  acetaminophen (TYLENOL) 500 MG tablet, Take 500-1,000 mg by mouth every 6 (six) hours as needed for pain. , Disp: , Rfl:   ?  aspirin 81 MG EC tablet, Take 81 mg by mouth at bedtime. , Disp: , Rfl:   ?  atenolol (TENORMIN) 25 MG tablet, Take 1 tablet (25 mg total) by mouth daily. (Patient taking differently: Take 12.5 mg by mouth daily. ), Disp: 30 tablet, Rfl: 3  ?  BIOTIN ORAL, Take 1 capsule by mouth daily., Disp: , Rfl:   ?  calcium-vitamin D (CALCIUM-VITAMIN D) 500 mg(1,250mg) -200 unit per tablet, Take 1 tablet by mouth daily., Disp: , Rfl:   ?  furosemide (LASIX) 20 MG tablet, Take 1 tablet (20 mg total) by mouth daily., Disp: 90 tablet, Rfl: 3  ?  multivitamin therapeutic (THERAGRAN) tablet, Take 1 tablet by mouth daily., Disp: , Rfl:   ?  naproxen sodium (ALEVE) 220 MG tablet, Take 220 mg by mouth every 12 (twelve) hours as needed for pain. , Disp: , Rfl:   ?  OMEGA-3/DHA/EPA/FISH OIL (FISH OIL-OMEGA-3 FATTY ACIDS) 300-1,000 mg capsule, Take 2 g by mouth daily., Disp: , Rfl:   ?  peg 400-propylene glycol PF (SYSTANE) 0.4-0.3 % Dpet, Administer 1 drop to both eyes 2 (two) times a day as needed., Disp: , Rfl:   ?  PRAMOXINE/MENTHOL/PETROLATUM (SARNA ULTRA TOP), Apply 1 application topically daily as needed.    , Disp: , Rfl:   ?  sertraline (ZOLOFT) 50 MG tablet, Take 1 tablet (50 mg total) by mouth daily., Disp: 90 tablet, Rfl: 1  ?  atorvastatin (LIPITOR) 20 MG tablet, Take 1 tablet (20 mg total) by mouth at bedtime., Disp: 90 tablet, Rfl: 0  ?  isosorbide mononitrate (IMDUR) 30 MG 24 hr tablet, Take 1 tablet (30 mg total) by mouth daily., Disp: 90 tablet, Rfl: 11  ?  mirtazapine (REMERON) 15 MG tablet, Take 15 mg by mouth at bedtime as  needed.    , Disp: , Rfl:   ?  nitroglycerin (NITROSTAT) 0.4 MG SL tablet, Place 1 tablet (0.4 mg total) under the tongue every 5 (five) minutes as needed for chest pain., Disp: 1 Bottle, Rfl: 2  ?  omeprazole (PRILOSEC) 20 MG capsule, Take 20 mg by mouth daily as needed. Take when using NSAIDS (ibuprofen, naproxen, etc), Disp: , Rfl:   ?  pravastatin (PRAVACHOL) 40 MG tablet, Take 1 tablet by mouth daily., Disp: , Rfl:     No Known Allergies    Social History     Socioeconomic History   ? Marital status:      Spouse name: Not on file   ? Number of children: Not on file   ? Years of education: Not on file   ? Highest education level: Not on file   Occupational History   ? Not on file   Social Needs   ? Financial resource strain: Not on file   ? Food insecurity     Worry: Not on file     Inability: Not on file   ? Transportation needs     Medical: Not on file     Non-medical: Not on file   Tobacco Use   ? Smoking status: Never Smoker   ? Smokeless tobacco: Never Used   Substance and Sexual Activity   ? Alcohol use: No   ? Drug use: No   ? Sexual activity: Not Currently     Partners: Male     Birth control/protection: Post-menopausal   Lifestyle   ? Physical activity     Days per week: Not on file     Minutes per session: Not on file   ? Stress: Not on file   Relationships   ? Social connections     Talks on phone: Not on file     Gets together: Not on file     Attends [employer] service: Not on file     Active member of club or organization: Not on file     Attends meetings of clubs or organizations: Not on file     Relationship status: Not on file   ? Intimate partner violence     Fear of current or ex partner: Not on file     Emotionally abused: Not on file     Physically abused: Not on file     Forced sexual activity: Not on file   Other Topics Concern   ? Not on file   Social History Narrative   ? Not on file       Family History   Problem Relation Age of Onset   ? Hypertension Mother    ? Hypertension  Father    ? Stroke Father    ? Kidney disease Maternal Grandmother    ? Kidney disease Maternal Grandfather    ? Early death Maternal Grandfather    ? Heart disease Maternal Grandfather    ? Kidney disease Paternal Grandmother    ? Kidney disease Paternal Grandfather    ? Early death Paternal Grandfather    ? Heart disease Paternal Grandfather    ? Heart attack Sister    ? Heart attack Brother    ? Dementia Brother         disabled vet       Review of Systems:    See HPI    Imaging:    I personally reviewed the following imaging results today and those on care everywhere, if indicated    EXAM: US VENOUS LEG RIGHT  LOCATION: [facility]  DATE/TIME: 4/18/2019 1:38 PM     INDICATION: leg swelling and pain  COMPARISON: None.  TECHNIQUE: Routine exam without and with compression, augmentation, and duplex utilizing 2D gray-scale imaging, Doppler interrogation with color-flow and spectral waveform analysis.     FINDINGS: The common femoral, femoral, popliteal, and segmentally visualized calf veins were evaluated. The opposite CFV was also included in the evaluation.     Right leg veins are negative for deep venous thrombosis. No popliteal cysts.     IMPRESSION:   CONCLUSION:  1.  Right leg veins are negative for DVT.  EXAM: CTA CHEST PE RUN  LOCATION: [facility]  DATE/TIME: 12/9/2019 9:55 AM     INDICATION: Shortness of breath.  COMPARISON: None.  TECHNIQUE: CT angiogram chest during arterial phase injection IV contrast. 2D and 3D MIP reconstructions were performed by the CT technologist. Dose reduction techniques were used.   CONTRAST: Iohexol (Omni) 100 mL     FINDINGS:  ANGIOGRAM CHEST: Pulmonary arteries are normal caliber and negative for pulmonary emboli. Thoracic aorta is negative for dissection. No CT evidence of right heart strain.     LUNGS AND PLEURA: Normal.     MEDIASTINUM/AXILLAE: Normal.     UPPER ABDOMEN: Normal.     MUSCULOSKELETAL: Normal.     IMPRESSION:   1.  Negative CTA chest.      No  new results.    Labs:    I personally reviewed the following lab results today and those on care everywhere, if indicated    No results found for: SEDRATE      No results found for: CRP        Lab Results   Component Value Date    CREATININE 0.72 10/02/2020      No results found for: HGBA1C        Lab Results   Component Value Date    BUN 20 10/02/2020              Lab Results   Component Value Date    ALBUMIN 3.8 12/09/2019       Vitamin D, Total (25-Hydroxy)   Date Value Ref Range Status   09/11/2019 34.1 30.0 - 80.0 ng/mL Final       Lab Results   Component Value Date    TSH 2.83 09/11/2019     Lab Results   Component Value Date    WBC 7.5 12/09/2019    HGB 15.0 12/09/2019    HCT 44.3 12/09/2019    MCV 95 12/09/2019     12/09/2019         Physical Exam:  There were no vitals filed for this visit.     Circumferential measures:    Vasc Edema 6/1/2015 6/8/2015   Right just above MTP 21.3  20.5   Right Ankle 28.5 27.5   Right Widest Calf 39.5 39.4   Right Thigh Up 10cm - 43.5   Left - just above MTP 20.5 20.2   Left Ankle 25 23.3   Left Widest Calf 39.5 38.8   Left Thigh Up 10cm - 43.5       Incision 05/17/17 Surgical Chest Left;Anterior (front) (Active)        General:  89 y.o. female in no apparent distress.      Psych: Alert and oriented x 3.  Cooperative. Affect normal.    HEENT: Atraumatic, normocephalic    Integumentary: Skin of the legs is uniformly warm and dry. Per video.    Hailey Regalado MD, Founding Diplomate ABWMS, FACCWS, FAAPMR  Medical Director Wound Care and Lymphedema  [facility]  [phone]      This note was dictated using a voice recognition software.  Any grammatical or context distortion are unintentional and inherent to the software.

## 2021-06-30 NOTE — PROGRESS NOTES
Progress Notes by Rufino Rene MD at 3/24/2021 10:10 AM     Author: Rufino Rene MD Service: -- Author Type: Physician    Filed: 3/24/2021 10:47 AM Encounter Date: 3/24/2021 Status: Signed    : Rufino Rene MD (Physician)             [facility] Progress Note    Assessment:  1.  Coronary artery disease.  She endorses some left arm discomfort but has some difficulty describing the symptom.  It is short in duration and not clearly predictable with exertion.  She does have a history of coronary artery disease.  After discussion we are going to plan a nuclear stress test as follow-up to the study 2018.  We discussed the use of nitroglycerin.  We discussed when to seek more immediate medical attention.    2.  Heart failure with preserved ejection fraction.  She has chronic lymphedema and legs look stable today.  She is not describing orthopnea or PND.    3.  Sick sinus syndrome.  Most recent pacemaker check was March 5, 2021 normal pacemaker function.    4.  Dyslipidemia.  She had been off pravastatin but believes that she is now taking the medication.  However she did not bring in her medication list today.  She is going to confirm her medication list contacting my nurse this afternoon.  We are going to plan a lipid panel when she comes for the nuclear stress test.      Plan:Lexiscan nuclear stress test in the near future.  Fasting lipid panel and chemistry panel.  Follow-up pending the results and recommendations.    1. CAD (coronary artery disease)  NM MPI with Lexiscan    Lipid Profile    Comprehensive metabolic panel   2. Venous insufficiency of both lower extremities     3. SSS (sick sinus syndrome) (H)     4. Atherosclerotic heart disease of native coronary artery without angina pectoris           An After Visit Summary was printed and given to the patient.    Subjective:    Isamar Little is a 89 y.o. female who returned for a planned  follow up visit.he has a remote  history of a coronary intervention in 2010 to the diagonal and circumflex with stents at that time.  She has known occlusion of the LAD.  Nuclear stress test from October 2018 demonstrate a small area of inferoseptal ischemia with a medium sized area of transmural infarction involving the apical and anteroseptal segments with normal ejection fraction    She reports that overall she feels well.  However she mentions that she has an occasional left arm discomfort that she has some difficulty describing but states that it only lasts a minute in duration.  She was not certain whether this resulted from movement of her arm or if it just came on spontaneous but was thinking that it was more spontaneous.  The duration is only 1 minute so she is not utilize nitroglycerin and there is no associated chest discomfort, shortness of breath or other heart related symptoms.    Review of Systems:   General: WNL  Eyes: WNL  Ears/Nose/Throat: Hearing Loss  Lungs: WNL  Heart: Arm Pain, Leg Swelling  Stomach: WNL  Bladder: Frequent Urination at Night  Muscle/Joints: WNL  Skin: Rash  Nervous System: WNL  Mental Health: WNL     Blood: WNL      Problem List:    Patient Active Problem List   Diagnosis   ? Essential hypertension with goal blood pressure less than 140/90   ? Anxiety   ? Hyperlipidemia with target LDL less than 70   ? Adjustment disorder with mixed anxiety and depressed mood   ? Skin cancer   ? Atherosclerotic heart disease of native coronary artery without angina pectoris   ? Paroxysmal atrial fibrillation (H)   ? SSS (sick sinus syndrome) (H)   ? Cardiac pacemaker, dual, in situ   ? Chronic diastolic congestive heart failure (H)   ? Closed fracture of tooth with routine healing   ? Primary insomnia   ? Primary osteoarthritis   ? Osteoporosis without current pathological fracture, unspecified osteoporosis type   ? Gastroesophageal reflux disease   ? Chronic edema   ? Leg swelling   ? Venous hypertension of lower extremity,  bilateral   ? Venous insufficiency of both lower extremities   ? Acquired lymphedema of leg   ? Advanced age       Social History     Socioeconomic History   ? Marital status:      Spouse name: Not on file   ? Number of children: Not on file   ? Years of education: Not on file   ? Highest education level: Not on file   Occupational History   ? Not on file   Social Needs   ? Financial resource strain: Not on file   ? Food insecurity     Worry: Not on file     Inability: Not on file   ? Transportation needs     Medical: Not on file     Non-medical: Not on file   Tobacco Use   ? Smoking status: Never Smoker   ? Smokeless tobacco: Never Used   Substance and Sexual Activity   ? Alcohol use: No   ? Drug use: No   ? Sexual activity: Not Currently     Partners: Male     Birth control/protection: Post-menopausal   Lifestyle   ? Physical activity     Days per week: Not on file     Minutes per session: Not on file   ? Stress: Not on file   Relationships   ? Social connections     Talks on phone: Not on file     Gets together: Not on file     Attends Alevism service: Not on file     Active member of club or organization: Not on file     Attends meetings of clubs or organizations: Not on file     Relationship status: Not on file   ? Intimate partner violence     Fear of current or ex partner: Not on file     Emotionally abused: Not on file     Physically abused: Not on file     Forced sexual activity: Not on file   Other Topics Concern   ? Not on file   Social History Narrative   ? Not on file       Family History   Problem Relation Age of Onset   ? Hypertension Mother    ? Hypertension Father    ? Stroke Father    ? Kidney disease Maternal Grandmother    ? Kidney disease Maternal Grandfather    ? Early death Maternal Grandfather    ? Heart disease Maternal Grandfather    ? Kidney disease Paternal Grandmother    ? Kidney disease Paternal Grandfather    ? Early death Paternal Grandfather    ? Heart disease Paternal  "Grandfather    ? Heart attack Sister    ? Heart attack Brother    ? Dementia Brother         disabled vet       Current Outpatient Medications   Medication Sig Dispense Refill   ? acetaminophen (TYLENOL) 500 MG tablet Take 500-1,000 mg by mouth every 6 (six) hours as needed for pain.      ? aspirin 81 MG EC tablet Take 81 mg by mouth at bedtime.      ? atenoloL (TENORMIN) 25 MG tablet Take 1 tablet (25 mg total) by mouth daily. 90 tablet 3   ? calcium-vitamin D (CALCIUM-VITAMIN D) 500 mg(1,250mg) -200 unit per tablet Take 1 tablet by mouth daily.     ? furosemide (LASIX) 20 MG tablet Take 1 tablet (20 mg total) by mouth daily. 90 tablet 3   ? multivitamin therapeutic (THERAGRAN) tablet Take 1 tablet by mouth daily.     ? nitroglycerin (NITROSTAT) 0.4 MG SL tablet Place 1 tablet (0.4 mg total) under the tongue every 5 (five) minutes as needed for chest pain. 1 Bottle 2   ? OMEGA-3/DHA/EPA/FISH OIL (FISH OIL-OMEGA-3 FATTY ACIDS) 300-1,000 mg capsule Take 2 g by mouth daily.     ? peg 400-propylene glycol PF (SYSTANE) 0.4-0.3 % Dpet Administer 1 drop to both eyes 2 (two) times a day as needed.     ? pravastatin (PRAVACHOL) 40 MG tablet Take 1 tablet (40 mg total) by mouth daily. 90 tablet 4   ? sertraline (ZOLOFT) 50 MG tablet Take 1 tablet (50 mg total) by mouth daily. 90 tablet 1   ? mirtazapine (REMERON) 15 MG tablet Take 15 mg by mouth at bedtime as needed.            ? omeprazole (PRILOSEC) 20 MG capsule Take 20 mg by mouth daily as needed. Take when using NSAIDS (ibuprofen, naproxen, etc)       No current facility-administered medications for this visit.        Objective:     /70 (Patient Site: Right Arm, Patient Position: Sitting, Cuff Size: Adult Regular)   Pulse 72   Resp 16   Ht 4' 11\" (1.499 m)   Wt 140 lb (63.5 kg)   BMI 28.28 kg/m    140 lb (63.5 kg)   [unfilled]  Wt Readings from Last 3 Encounters:   03/24/21 140 lb (63.5 kg)   12/09/19 142 lb (64.4 kg)   08/01/19 140 lb (63.5 kg) "       Physical Exam:    GENERAL APPEARANCE: alert, no apparent distress  HEENT: no scleral icterus or xanthelasma  NECK: jugular venous pressure difficult to assess as she is examined in the chair as it was too difficult for her to get into the examining table.  CHEST: symmetric, the lungs are clear to auscultation  CARDIOVASCULAR: regular rhythm with soft systolic murmur; no carotid bruits  Abdomen: No Organomegaly, masses, bruits, or tenderness. Bowels sounds are present      EXTREMITIES: no cyanosis, clubbing, mild ankle edema with venous stasis changes    Cardiac Testing:   Echo Complete  Order# [id number]  Reading physician: Megha Mcneal MD Ordering physician: Rufino Rene MD Study date: 5/15/17   Performing Date Performing Department   May 15, 2017 [facility] [[id number]]   Patient Information    Patient Name   Isamar Little MRN   [id number] Sex   Female  1   1928 (89 y.o.)   Indications    Syncope   Summary      1.Left ventricle ejection fraction is normal. The calculated left ventricular ejection fraction is 63%.    2.Mild to moderate tricuspid and mild to moderate mitral regurgitation.    3.When compared to the report of the previous study dated 2008, there are changes noted. Images unavailable but apparently mild to moderate tricuspid and mitral regurgitations are new as is distal septal wall motion abnormality     Patient Information    Patient Name   Isamar Little MRN   [id number] Sex   Female  1   1928 (89 y.o.)   EKG Scan     Stress Test Data - Scan on 10/17/18 9:32 AM by    Indications    CAD (coronary artery disease)   Conclusion      The pharmacologic nuclear stress test is abnormal.    There is a small area of ischemia in the inferoseptal segment(s) of the left ventricle. There is a medium sized area of transmural infarction in the apical and anteroseptal segment(s) of the left ventricle.    The left ventricular ejection fraction is 70%.    When compared  to the images of 5/16/2017, on review of prior images there was a small area of inferoseptal ischemia. No change when directly compared to current study images.        Type: routine remote pacemaker check  Presenting Rhythm: AP-VS, 86bpm  Lead/Battery status: Stable lead and battery measurements.  Arrhythmias: since 11/30/2020, 1 mode switch <1 minute. No EGM. No VHR  Comments: Normal device function. YY    Lab Results:    Lab Results   Component Value Date     10/02/2020    K 4.4 10/02/2020     10/02/2020    CO2 26 10/02/2020    BUN 20 10/02/2020    CREATININE 0.72 10/02/2020    CALCIUM 9.2 10/02/2020     Lab Results   Component Value Date    CHOL 214 (H) 10/02/2020    TRIG 188 (H) 10/02/2020    HDL 48 (L) 10/02/2020     BNP (pg/mL)   Date Value   12/09/2019 68   04/18/2019 109   06/21/2018 99     Creatinine (mg/dL)   Date Value   10/02/2020 0.72   12/09/2019 0.78   09/11/2019 0.77   04/18/2019 0.83     LDL Calculated (mg/dL)   Date Value   10/02/2020 128   09/11/2019 91   08/13/2019 154 (H)     Lab Results   Component Value Date    WBC 7.5 12/09/2019    HGB 15.0 12/09/2019    HCT 44.3 12/09/2019    MCV 95 12/09/2019     12/09/2019         This note has been dictated using voice recognition software. Any grammatical or context distortions are unintentional and inherent to the software.

## 2021-06-30 NOTE — PROGRESS NOTES
"Progress Notes by Rufino Rene MD at 2/8/2021  1:30 PM     Author: Rufino Rene MD Service: -- Author Type: Physician    Filed: 2/8/2021  2:28 PM Encounter Date: 2/8/2021 Status: Signed    : Rufino Rene MD (Physician)           The patient has been notified of following:     \"This video visit will be conducted via a call between you and your physician/provider. We have found that certain health care needs can be provided without the need for an in-person physical exam.  This service lets us provide the care you need with a video conversation.  If a prescription is necessary we can send it directly to your pharmacy.  If lab work is needed we can place an order for that and you can then stop by our lab to have the test done at a later time.      Patient has given verbal consent to a Video visit? Yes    HEART CARE VIDEO ENCOUNTER        The patient has chosen to have the visit conducted as a video visit, to reduce risk of exposure given the current status of Coronavirus in our community. This video visit is being conducted via a call between the patient and physician/provider. Health care needs are being provided without a physical exam.     Assessment/Recommendations   Assessment/Plan:  1.  Coronary artery disease.  No complaints of anginal chest discomfort of use of nitroglycerin.  She has been off isosorbide mononitrate per review of her medication regimen at this point I did not reinitiate.  I did however give her a new prescription for sublingual nitroglycerin and instructed her in its use.  She will update us with any specific symptoms or questions.  2.  Heart failure with preserved ejection fraction.  She has some chronic lymphedema but no specific complaints of heart failure.  No orthopnea or PND.  Her legs by visual inspection via the video conference today demonstrated 1+ edema with some venous stasis changes but no significant skin breakdown.  2.  Sick sinus syndrome.  Most recent " pacemaker interrogation is from November at which time her pacemaker function was identified.  One 2-second episode of atrial tachycardia.  2 nonsustained ventricular high rate episodes.  3.  Dyslipidemia.  She reportedly has not been taking statins but for uncertain reasons not because she did not tolerate.  She is willing to go back on the pravastatin which I sent in a prescription asking her to monitor for any specific side effects.  We will plan to repeat the lipid panel AST and ALT when she returns to visit in approximately 6 to 8 weeks.  Cholesterol October 2, 2020 of 214 with an LDL of 128.  Her LDL was 90 1 September 2019 at which time I think she was taking her statin medications.  Follow Up Plan:  2 months  I have reviewed the note as documented.  This accurately captures the substance of my conversation with the patient.    Total time of video between patient and provider was 15 minutes   Start Time:200   Stop Time:215    Originating Location (pt. Location): Home    Distant Location (provider location):  [facility]     Mode of Communication:  Video Conference via [email or web address]       History of Present Illness/Subjective    Isamar Little is a 89 y.o. female who is being evaluated via a billable video visit and has consented to a video visit. Isamar Little has a history of coronary artery disease and hyperlipidemia.  She has a history of sick sinus syndrome and is status post pacemaker implantation.  She is seen with the use of a video conference with the help of one of her friends.  She denies chest pain or significant shortness of breath.  She denies dizziness or lightheadedness.  She has some mild lower extremity edema which she states is stable.  She does not weigh herself on a regular basis but believes her weights to be stable.  She does try to follow a low-salt diet.    She has a remote history of a coronary intervention in 2010 to the diagonal and circumflex with stents  at that time.  She has known occlusion of the LAD.  Nuclear stress test from October 2018 demonstrate a small area of inferoseptal ischemia with a medium sized area of transmural infarction involving the apical and anteroseptal segments with normal ejection fraction.  She has been on isosorbide mononitrate but has discontinued this medication for uncertain reasons.  She has not been back to see her primary care physician in some time.  In addition she apparently has discontinued the pravastatin which we had discussed in the past.  She is willing to go back on the pravastatin.  She is not able to provide me with any blood pressure readings today.      I have reviewed and updated the patient's Past Medical History, Social History, Family History and Medication List.     Physical Examination performed via live video encounter Review of Systems   General Appearance:   no distress, normal body habitus, upright.   ENT/Mouth: membranes moist, no nasal discharge or bleeding gums.  Normal head shape, no evidence of injury or laceration.     EYES:  no scleral icterus, normal conjunctivae   Neck: .  Supple   Chest/Lungs:   No audible wheezing equal chest wall expansion. Non labored breathing.  No cough.   Cardiovascular:      Abdomen:     Extremities:  1+ edema bilaterally with some venous stasis changes.   Skin:    Neurologic:    Psychiatric: alert and oriented x3, calm                                               Medical History  Surgical History Family History Social History   Past Medical History:   Diagnosis Date   ? Adjustment disorder with mixed anxiety and depressed mood    ? Anxiety     Previously on sertraline   ? Anxiety state, unspecified    ? Atherosclerotic heart disease of native coronary artery without angina pectoris 5/16/2017   ? Bereavement, uncomplicated    ? Cardiac pacemaker, dual, in situ 5/31/2017    [employer] Jaspal MEEKS DOI: 5/17/17 Dr. David Patel    ? Chronic diastolic congestive heart  failure (H) 6/28/2018   ? Chronic edema 7/13/2020   ? Closed fracture of tooth with routine healing 4/23/2019   ? Coronary atherosclerosis of native coronary artery    ? Depressive disorder, not elsewhere classified    ? Edema    ? Essential hypertension with goal blood pressure less than 140/90    ? Essential hypertension, benign    ? Gastroesophageal reflux disease 7/13/2020   ? Hyperlipidemia with target LDL less than 70    ? Osteoporosis without current pathological fracture, unspecified osteoporosis type 7/13/2020   ? Other and unspecified hyperlipidemia    ? Other malaise and fatigue    ? Paroxysmal atrial fibrillation (H)    ? Primary insomnia 7/13/2020   ? Primary osteoarthritis 7/13/2020   ? Skin cancer    ? SSS (sick sinus syndrome) (H)     Past Surgical History:   Procedure Laterality Date   ? ANGIOPLASTY  06/2008    stents x3   ? CAPSULOTOMY Left 1/2014   ? CARDIAC PACEMAKER PLACEMENT     ? CATARACT EXTRACTION, BILATERAL  1/2010   ? DILATION AND CURETTAGE OF UTERUS      X3   ? TONSILLECTOMY AND ADENOIDECTOMY      Family History   Problem Relation Age of Onset   ? Hypertension Mother    ? Hypertension Father    ? Stroke Father    ? Kidney disease Maternal Grandmother    ? Kidney disease Maternal Grandfather    ? Early death Maternal Grandfather    ? Heart disease Maternal Grandfather    ? Kidney disease Paternal Grandmother    ? Kidney disease Paternal Grandfather    ? Early death Paternal Grandfather    ? Heart disease Paternal Grandfather    ? Heart attack Sister    ? Heart attack Brother    ? Dementia Brother         disabled vet      Social History     Socioeconomic History   ? Marital status:      Spouse name: Not on file   ? Number of children: Not on file   ? Years of education: Not on file   ? Highest education level: Not on file   Occupational History   ? Not on file   Social Needs   ? Financial resource strain: Not on file   ? Food insecurity     Worry: Not on file     Inability: Not on  file   ? Transportation needs     Medical: Not on file     Non-medical: Not on file   Tobacco Use   ? Smoking status: Never Smoker   ? Smokeless tobacco: Never Used   Substance and Sexual Activity   ? Alcohol use: No   ? Drug use: No   ? Sexual activity: Not Currently     Partners: Male     Birth control/protection: Post-menopausal   Lifestyle   ? Physical activity     Days per week: Not on file     Minutes per session: Not on file   ? Stress: Not on file   Relationships   ? Social connections     Talks on phone: Not on file     Gets together: Not on file     Attends Mandaen service: Not on file     Active member of club or organization: Not on file     Attends meetings of clubs or organizations: Not on file     Relationship status: Not on file   ? Intimate partner violence     Fear of current or ex partner: Not on file     Emotionally abused: Not on file     Physically abused: Not on file     Forced sexual activity: Not on file   Other Topics Concern   ? Not on file   Social History Narrative   ? Not on file          Medications  Allergies   Current Outpatient Medications   Medication Sig Dispense Refill   ? acetaminophen (TYLENOL) 500 MG tablet Take 500-1,000 mg by mouth every 6 (six) hours as needed for pain.      ? aspirin 81 MG EC tablet Take 81 mg by mouth at bedtime.      ? atenoloL (TENORMIN) 25 MG tablet Take 1 tablet (25 mg total) by mouth daily. 90 tablet 3   ? furosemide (LASIX) 20 MG tablet Take 1 tablet (20 mg total) by mouth daily. 90 tablet 3   ? sertraline (ZOLOFT) 50 MG tablet Take 1 tablet (50 mg total) by mouth daily. 90 tablet 1   ? BIOTIN ORAL Take 1 capsule by mouth daily.     ? calcium-vitamin D (CALCIUM-VITAMIN D) 500 mg(1,250mg) -200 unit per tablet Take 1 tablet by mouth daily.     ? mirtazapine (REMERON) 15 MG tablet Take 15 mg by mouth at bedtime as needed.            ? multivitamin therapeutic (THERAGRAN) tablet Take 1 tablet by mouth daily.     ? naproxen sodium (ALEVE) 220 MG tablet  Take 220 mg by mouth every 12 (twelve) hours as needed for pain.      ? nitroglycerin (NITROSTAT) 0.4 MG SL tablet Place 1 tablet (0.4 mg total) under the tongue every 5 (five) minutes as needed for chest pain. 1 Bottle 2   ? OMEGA-3/DHA/EPA/FISH OIL (FISH OIL-OMEGA-3 FATTY ACIDS) 300-1,000 mg capsule Take 2 g by mouth daily.     ? omeprazole (PRILOSEC) 20 MG capsule Take 20 mg by mouth daily as needed. Take when using NSAIDS (ibuprofen, naproxen, etc)     ? peg 400-propylene glycol PF (SYSTANE) 0.4-0.3 % Dpet Administer 1 drop to both eyes 2 (two) times a day as needed.     ? PRAMOXINE/MENTHOL/PETROLATUM (SARNA ULTRA TOP) Apply 1 application topically daily as needed.            ? pravastatin (PRAVACHOL) 40 MG tablet Take 1 tablet (40 mg total) by mouth daily. 90 tablet 4     No current facility-administered medications for this visit.     No Known Allergies      Lab Results    Chemistry/lipid CBC Cardiac Enzymes/BNP/TSH/INR   Lab Results   Component Value Date    CHOL 214 (H) 10/02/2020    HDL 48 (L) 10/02/2020    LDLCALC 128 10/02/2020    TRIG 188 (H) 10/02/2020    CREATININE 0.72 10/02/2020    BUN 20 10/02/2020    K 4.4 10/02/2020     10/02/2020     10/02/2020    CO2 26 10/02/2020    Lab Results   Component Value Date    WBC 7.5 12/09/2019    HGB 15.0 12/09/2019    HCT 44.3 12/09/2019    MCV 95 12/09/2019     12/09/2019    Lab Results   Component Value Date    CKMB 1 02/09/2012    TROPONINI <0.01 05/15/2017    BNP 68 12/09/2019    TSH 2.83 09/11/2019    INR 1.07 04/18/2019        Rufino Rene

## 2021-07-04 NOTE — LETTER
Letter by Margi Marcial at      Author: Margi Marcial Service: -- Author Type: --    Filed:  Encounter Date: 6/7/2021 Status: (Other)         Isamar Little  [address]  [facility] [address]      June 7, 2021      Dear MsJimi Sidney,    RE: Remote Results    We are writing to you regarding your recent Remote Pacemaker check from home. Your transmission was received successfully. Battery status is satisfactory at this time.     Your results are showing no significant changes.    Your next device appointment will be a remote check on September 14, 2021.  You can choose the time of day you wish to transmit.    To schedule or reschedule, please call [phone] and press 1.    NOTE: If you would like to do an extra transmission, please call [phone] and press 3 to speak to a nurse BEFORE transmitting. This ensures that the Device Clinic staff is aware of the reason you are sending a transmission, and can follow-up with you after it has been reviewed.    We will be checking your implanted device from home (remotely) every three months unless otherwise instructed. We will need to see you in the clinic at least once a year. You may need to be seen in the clinic sooner depending on the results of your check.    Please be aware:    The follow-up schedule is like a Physician prescription.    Your remote monitor is paired to your specific implanted device.      Sincerely,    [facility]

## 2021-07-21 ENCOUNTER — RECORDS - HEALTHEAST (OUTPATIENT)
Dept: ADMINISTRATIVE | Facility: CLINIC | Age: 86
End: 2021-07-21

## 2021-09-14 ENCOUNTER — ANCILLARY PROCEDURE (OUTPATIENT)
Dept: CARDIOLOGY | Facility: CLINIC | Age: 86
End: 2021-09-14
Attending: INTERNAL MEDICINE
Payer: MEDICARE

## 2021-09-14 DIAGNOSIS — Z95.0 CARDIAC PACEMAKER IN SITU: ICD-10-CM

## 2021-09-14 DIAGNOSIS — I49.5 SSS (SICK SINUS SYNDROME) (H): Primary | ICD-10-CM

## 2021-09-14 LAB
MDC_IDC_EPISODE_DTM: NORMAL
MDC_IDC_EPISODE_DTM: NORMAL
MDC_IDC_EPISODE_DURATION: 1 S
MDC_IDC_EPISODE_DURATION: 1 S
MDC_IDC_EPISODE_ID: 8
MDC_IDC_EPISODE_ID: 9
MDC_IDC_EPISODE_TYPE: NORMAL
MDC_IDC_EPISODE_TYPE: NORMAL
MDC_IDC_LEAD_IMPLANT_DT: NORMAL
MDC_IDC_LEAD_IMPLANT_DT: NORMAL
MDC_IDC_LEAD_LOCATION: NORMAL
MDC_IDC_LEAD_LOCATION: NORMAL
MDC_IDC_LEAD_LOCATION_DETAIL_1: NORMAL
MDC_IDC_LEAD_LOCATION_DETAIL_1: NORMAL
MDC_IDC_LEAD_MFG: NORMAL
MDC_IDC_LEAD_MFG: NORMAL
MDC_IDC_LEAD_MODEL: NORMAL
MDC_IDC_LEAD_MODEL: NORMAL
MDC_IDC_LEAD_POLARITY_TYPE: NORMAL
MDC_IDC_LEAD_POLARITY_TYPE: NORMAL
MDC_IDC_LEAD_SERIAL: NORMAL
MDC_IDC_LEAD_SERIAL: NORMAL
MDC_IDC_LEAD_SPECIAL_FUNCTION: NORMAL
MDC_IDC_LEAD_SPECIAL_FUNCTION: NORMAL
MDC_IDC_MSMT_BATTERY_DTM: NORMAL
MDC_IDC_MSMT_BATTERY_REMAINING_LONGEVITY: 85 MO
MDC_IDC_MSMT_BATTERY_RRT_TRIGGER: 2.83
MDC_IDC_MSMT_BATTERY_STATUS: NORMAL
MDC_IDC_MSMT_BATTERY_VOLTAGE: 3.01 V
MDC_IDC_MSMT_LEADCHNL_RA_IMPEDANCE_VALUE: 342 OHM
MDC_IDC_MSMT_LEADCHNL_RA_IMPEDANCE_VALUE: 456 OHM
MDC_IDC_MSMT_LEADCHNL_RA_PACING_THRESHOLD_AMPLITUDE: 0.88 V
MDC_IDC_MSMT_LEADCHNL_RA_PACING_THRESHOLD_PULSEWIDTH: 0.4 MS
MDC_IDC_MSMT_LEADCHNL_RA_SENSING_INTR_AMPL: 3.38 MV
MDC_IDC_MSMT_LEADCHNL_RA_SENSING_INTR_AMPL: 3.38 MV
MDC_IDC_MSMT_LEADCHNL_RV_IMPEDANCE_VALUE: 361 OHM
MDC_IDC_MSMT_LEADCHNL_RV_IMPEDANCE_VALUE: 418 OHM
MDC_IDC_MSMT_LEADCHNL_RV_PACING_THRESHOLD_AMPLITUDE: 1 V
MDC_IDC_MSMT_LEADCHNL_RV_PACING_THRESHOLD_PULSEWIDTH: 0.4 MS
MDC_IDC_MSMT_LEADCHNL_RV_SENSING_INTR_AMPL: 7.62 MV
MDC_IDC_MSMT_LEADCHNL_RV_SENSING_INTR_AMPL: 7.62 MV
MDC_IDC_PG_IMPLANT_DTM: NORMAL
MDC_IDC_PG_MFG: NORMAL
MDC_IDC_PG_MODEL: NORMAL
MDC_IDC_PG_SERIAL: NORMAL
MDC_IDC_PG_TYPE: NORMAL
MDC_IDC_SESS_CLINIC_NAME: NORMAL
MDC_IDC_SESS_DTM: NORMAL
MDC_IDC_SESS_TYPE: NORMAL
MDC_IDC_SET_BRADY_AT_MODE_SWITCH_RATE: 171 {BEATS}/MIN
MDC_IDC_SET_BRADY_HYSTRATE: NORMAL
MDC_IDC_SET_BRADY_LOWRATE: 60 {BEATS}/MIN
MDC_IDC_SET_BRADY_MAX_SENSOR_RATE: 130 {BEATS}/MIN
MDC_IDC_SET_BRADY_MAX_TRACKING_RATE: 130 {BEATS}/MIN
MDC_IDC_SET_BRADY_MODE: NORMAL
MDC_IDC_SET_BRADY_PAV_DELAY_LOW: 180 MS
MDC_IDC_SET_BRADY_SAV_DELAY_LOW: 150 MS
MDC_IDC_SET_LEADCHNL_RA_PACING_AMPLITUDE: 1.5 V
MDC_IDC_SET_LEADCHNL_RA_PACING_ANODE_ELECTRODE_1: NORMAL
MDC_IDC_SET_LEADCHNL_RA_PACING_ANODE_LOCATION_1: NORMAL
MDC_IDC_SET_LEADCHNL_RA_PACING_CAPTURE_MODE: NORMAL
MDC_IDC_SET_LEADCHNL_RA_PACING_CATHODE_ELECTRODE_1: NORMAL
MDC_IDC_SET_LEADCHNL_RA_PACING_CATHODE_LOCATION_1: NORMAL
MDC_IDC_SET_LEADCHNL_RA_PACING_POLARITY: NORMAL
MDC_IDC_SET_LEADCHNL_RA_PACING_PULSEWIDTH: 0.4 MS
MDC_IDC_SET_LEADCHNL_RA_SENSING_ANODE_ELECTRODE_1: NORMAL
MDC_IDC_SET_LEADCHNL_RA_SENSING_ANODE_LOCATION_1: NORMAL
MDC_IDC_SET_LEADCHNL_RA_SENSING_CATHODE_ELECTRODE_1: NORMAL
MDC_IDC_SET_LEADCHNL_RA_SENSING_CATHODE_LOCATION_1: NORMAL
MDC_IDC_SET_LEADCHNL_RA_SENSING_POLARITY: NORMAL
MDC_IDC_SET_LEADCHNL_RA_SENSING_SENSITIVITY: 0.3 MV
MDC_IDC_SET_LEADCHNL_RV_PACING_AMPLITUDE: 1.5 V
MDC_IDC_SET_LEADCHNL_RV_PACING_ANODE_ELECTRODE_1: NORMAL
MDC_IDC_SET_LEADCHNL_RV_PACING_ANODE_LOCATION_1: NORMAL
MDC_IDC_SET_LEADCHNL_RV_PACING_CAPTURE_MODE: NORMAL
MDC_IDC_SET_LEADCHNL_RV_PACING_CATHODE_ELECTRODE_1: NORMAL
MDC_IDC_SET_LEADCHNL_RV_PACING_CATHODE_LOCATION_1: NORMAL
MDC_IDC_SET_LEADCHNL_RV_PACING_POLARITY: NORMAL
MDC_IDC_SET_LEADCHNL_RV_PACING_PULSEWIDTH: 0.4 MS
MDC_IDC_SET_LEADCHNL_RV_SENSING_ANODE_ELECTRODE_1: NORMAL
MDC_IDC_SET_LEADCHNL_RV_SENSING_ANODE_LOCATION_1: NORMAL
MDC_IDC_SET_LEADCHNL_RV_SENSING_CATHODE_ELECTRODE_1: NORMAL
MDC_IDC_SET_LEADCHNL_RV_SENSING_CATHODE_LOCATION_1: NORMAL
MDC_IDC_SET_LEADCHNL_RV_SENSING_POLARITY: NORMAL
MDC_IDC_SET_LEADCHNL_RV_SENSING_SENSITIVITY: 0.9 MV
MDC_IDC_SET_ZONE_DETECTION_INTERVAL: 350 MS
MDC_IDC_SET_ZONE_DETECTION_INTERVAL: 400 MS
MDC_IDC_SET_ZONE_TYPE: NORMAL
MDC_IDC_STAT_AT_BURDEN_PERCENT: 0 %
MDC_IDC_STAT_AT_DTM_END: NORMAL
MDC_IDC_STAT_AT_DTM_START: NORMAL
MDC_IDC_STAT_BRADY_AP_VP_PERCENT: 0.02 %
MDC_IDC_STAT_BRADY_AP_VS_PERCENT: 17.84 %
MDC_IDC_STAT_BRADY_AS_VP_PERCENT: 0.02 %
MDC_IDC_STAT_BRADY_AS_VS_PERCENT: 82.12 %
MDC_IDC_STAT_BRADY_DTM_END: NORMAL
MDC_IDC_STAT_BRADY_DTM_START: NORMAL
MDC_IDC_STAT_BRADY_RA_PERCENT_PACED: 17.82 %
MDC_IDC_STAT_BRADY_RV_PERCENT_PACED: 0.04 %
MDC_IDC_STAT_EPISODE_RECENT_COUNT: 0
MDC_IDC_STAT_EPISODE_RECENT_COUNT: 2
MDC_IDC_STAT_EPISODE_RECENT_COUNT_DTM_END: NORMAL
MDC_IDC_STAT_EPISODE_RECENT_COUNT_DTM_START: NORMAL
MDC_IDC_STAT_EPISODE_TOTAL_COUNT: 0
MDC_IDC_STAT_EPISODE_TOTAL_COUNT: 9
MDC_IDC_STAT_EPISODE_TOTAL_COUNT_DTM_END: NORMAL
MDC_IDC_STAT_EPISODE_TOTAL_COUNT_DTM_START: NORMAL
MDC_IDC_STAT_EPISODE_TYPE: NORMAL

## 2021-09-14 PROCEDURE — 93296 REM INTERROG EVL PM/IDS: CPT | Performed by: INTERNAL MEDICINE

## 2021-09-14 PROCEDURE — 93294 REM INTERROG EVL PM/LDLS PM: CPT | Performed by: INTERNAL MEDICINE

## 2021-10-07 ENCOUNTER — LAB REQUISITION (OUTPATIENT)
Dept: LAB | Facility: CLINIC | Age: 86
End: 2021-10-07
Payer: MEDICARE

## 2021-10-07 DIAGNOSIS — I10 ESSENTIAL (PRIMARY) HYPERTENSION: ICD-10-CM

## 2021-10-07 LAB
ANION GAP SERPL CALCULATED.3IONS-SCNC: 13 MMOL/L (ref 5–18)
BUN SERPL-MCNC: 17 MG/DL (ref 8–28)
CALCIUM SERPL-MCNC: 9.8 MG/DL (ref 8.5–10.5)
CHLORIDE BLD-SCNC: 105 MMOL/L (ref 98–107)
CO2 SERPL-SCNC: 25 MMOL/L (ref 22–31)
CREAT SERPL-MCNC: 0.82 MG/DL (ref 0.6–1.1)
GFR SERPL CREATININE-BSD FRML MDRD: 62 ML/MIN/1.73M2
GLUCOSE BLD-MCNC: 101 MG/DL (ref 70–125)
POTASSIUM BLD-SCNC: 4.5 MMOL/L (ref 3.5–5)
SODIUM SERPL-SCNC: 143 MMOL/L (ref 136–145)

## 2021-10-07 PROCEDURE — 80048 BASIC METABOLIC PNL TOTAL CA: CPT | Mod: ORL | Performed by: FAMILY MEDICINE

## 2021-12-23 ENCOUNTER — ANCILLARY PROCEDURE (OUTPATIENT)
Dept: CARDIOLOGY | Facility: CLINIC | Age: 86
End: 2021-12-23
Attending: INTERNAL MEDICINE
Payer: MEDICARE

## 2021-12-23 DIAGNOSIS — I49.5 SSS (SICK SINUS SYNDROME) (H): ICD-10-CM

## 2021-12-23 DIAGNOSIS — Z95.0 CARDIAC PACEMAKER IN SITU: ICD-10-CM

## 2021-12-23 LAB
MDC_IDC_EPISODE_DTM: NORMAL
MDC_IDC_EPISODE_DURATION: 0 S
MDC_IDC_EPISODE_DURATION: 0 S
MDC_IDC_EPISODE_DURATION: 1 S
MDC_IDC_EPISODE_DURATION: 8 S
MDC_IDC_EPISODE_ID: 10
MDC_IDC_EPISODE_ID: 11
MDC_IDC_EPISODE_ID: 12
MDC_IDC_EPISODE_ID: 13
MDC_IDC_EPISODE_TYPE: NORMAL
MDC_IDC_LEAD_IMPLANT_DT: NORMAL
MDC_IDC_LEAD_IMPLANT_DT: NORMAL
MDC_IDC_LEAD_LOCATION: NORMAL
MDC_IDC_LEAD_LOCATION: NORMAL
MDC_IDC_LEAD_LOCATION_DETAIL_1: NORMAL
MDC_IDC_LEAD_LOCATION_DETAIL_1: NORMAL
MDC_IDC_LEAD_MFG: NORMAL
MDC_IDC_LEAD_MFG: NORMAL
MDC_IDC_LEAD_MODEL: NORMAL
MDC_IDC_LEAD_MODEL: NORMAL
MDC_IDC_LEAD_POLARITY_TYPE: NORMAL
MDC_IDC_LEAD_POLARITY_TYPE: NORMAL
MDC_IDC_LEAD_SERIAL: NORMAL
MDC_IDC_LEAD_SERIAL: NORMAL
MDC_IDC_LEAD_SPECIAL_FUNCTION: NORMAL
MDC_IDC_LEAD_SPECIAL_FUNCTION: NORMAL
MDC_IDC_MSMT_BATTERY_DTM: NORMAL
MDC_IDC_MSMT_BATTERY_REMAINING_LONGEVITY: 77 MO
MDC_IDC_MSMT_BATTERY_RRT_TRIGGER: 2.83
MDC_IDC_MSMT_BATTERY_STATUS: NORMAL
MDC_IDC_MSMT_BATTERY_VOLTAGE: 3.01 V
MDC_IDC_MSMT_LEADCHNL_RA_IMPEDANCE_VALUE: 323 OHM
MDC_IDC_MSMT_LEADCHNL_RA_IMPEDANCE_VALUE: 456 OHM
MDC_IDC_MSMT_LEADCHNL_RA_PACING_THRESHOLD_AMPLITUDE: 0.88 V
MDC_IDC_MSMT_LEADCHNL_RA_PACING_THRESHOLD_PULSEWIDTH: 0.4 MS
MDC_IDC_MSMT_LEADCHNL_RA_SENSING_INTR_AMPL: 3.88 MV
MDC_IDC_MSMT_LEADCHNL_RA_SENSING_INTR_AMPL: 3.88 MV
MDC_IDC_MSMT_LEADCHNL_RV_IMPEDANCE_VALUE: 361 OHM
MDC_IDC_MSMT_LEADCHNL_RV_IMPEDANCE_VALUE: 399 OHM
MDC_IDC_MSMT_LEADCHNL_RV_PACING_THRESHOLD_AMPLITUDE: 1 V
MDC_IDC_MSMT_LEADCHNL_RV_PACING_THRESHOLD_PULSEWIDTH: 0.4 MS
MDC_IDC_MSMT_LEADCHNL_RV_SENSING_INTR_AMPL: 7.5 MV
MDC_IDC_MSMT_LEADCHNL_RV_SENSING_INTR_AMPL: 7.5 MV
MDC_IDC_PG_IMPLANT_DTM: NORMAL
MDC_IDC_PG_MFG: NORMAL
MDC_IDC_PG_MODEL: NORMAL
MDC_IDC_PG_SERIAL: NORMAL
MDC_IDC_PG_TYPE: NORMAL
MDC_IDC_SESS_CLINIC_NAME: NORMAL
MDC_IDC_SESS_DTM: NORMAL
MDC_IDC_SESS_TYPE: NORMAL
MDC_IDC_SET_BRADY_AT_MODE_SWITCH_RATE: 171 {BEATS}/MIN
MDC_IDC_SET_BRADY_HYSTRATE: NORMAL
MDC_IDC_SET_BRADY_LOWRATE: 60 {BEATS}/MIN
MDC_IDC_SET_BRADY_MAX_SENSOR_RATE: 130 {BEATS}/MIN
MDC_IDC_SET_BRADY_MAX_TRACKING_RATE: 130 {BEATS}/MIN
MDC_IDC_SET_BRADY_MODE: NORMAL
MDC_IDC_SET_BRADY_PAV_DELAY_LOW: 180 MS
MDC_IDC_SET_BRADY_SAV_DELAY_LOW: 150 MS
MDC_IDC_SET_LEADCHNL_RA_PACING_AMPLITUDE: 1.5 V
MDC_IDC_SET_LEADCHNL_RA_PACING_ANODE_ELECTRODE_1: NORMAL
MDC_IDC_SET_LEADCHNL_RA_PACING_ANODE_LOCATION_1: NORMAL
MDC_IDC_SET_LEADCHNL_RA_PACING_CAPTURE_MODE: NORMAL
MDC_IDC_SET_LEADCHNL_RA_PACING_CATHODE_ELECTRODE_1: NORMAL
MDC_IDC_SET_LEADCHNL_RA_PACING_CATHODE_LOCATION_1: NORMAL
MDC_IDC_SET_LEADCHNL_RA_PACING_POLARITY: NORMAL
MDC_IDC_SET_LEADCHNL_RA_PACING_PULSEWIDTH: 0.4 MS
MDC_IDC_SET_LEADCHNL_RA_SENSING_ANODE_ELECTRODE_1: NORMAL
MDC_IDC_SET_LEADCHNL_RA_SENSING_ANODE_LOCATION_1: NORMAL
MDC_IDC_SET_LEADCHNL_RA_SENSING_CATHODE_ELECTRODE_1: NORMAL
MDC_IDC_SET_LEADCHNL_RA_SENSING_CATHODE_LOCATION_1: NORMAL
MDC_IDC_SET_LEADCHNL_RA_SENSING_POLARITY: NORMAL
MDC_IDC_SET_LEADCHNL_RA_SENSING_SENSITIVITY: 0.3 MV
MDC_IDC_SET_LEADCHNL_RV_PACING_AMPLITUDE: 1.5 V
MDC_IDC_SET_LEADCHNL_RV_PACING_ANODE_ELECTRODE_1: NORMAL
MDC_IDC_SET_LEADCHNL_RV_PACING_ANODE_LOCATION_1: NORMAL
MDC_IDC_SET_LEADCHNL_RV_PACING_CAPTURE_MODE: NORMAL
MDC_IDC_SET_LEADCHNL_RV_PACING_CATHODE_ELECTRODE_1: NORMAL
MDC_IDC_SET_LEADCHNL_RV_PACING_CATHODE_LOCATION_1: NORMAL
MDC_IDC_SET_LEADCHNL_RV_PACING_POLARITY: NORMAL
MDC_IDC_SET_LEADCHNL_RV_PACING_PULSEWIDTH: 0.4 MS
MDC_IDC_SET_LEADCHNL_RV_SENSING_ANODE_ELECTRODE_1: NORMAL
MDC_IDC_SET_LEADCHNL_RV_SENSING_ANODE_LOCATION_1: NORMAL
MDC_IDC_SET_LEADCHNL_RV_SENSING_CATHODE_ELECTRODE_1: NORMAL
MDC_IDC_SET_LEADCHNL_RV_SENSING_CATHODE_LOCATION_1: NORMAL
MDC_IDC_SET_LEADCHNL_RV_SENSING_POLARITY: NORMAL
MDC_IDC_SET_LEADCHNL_RV_SENSING_SENSITIVITY: 0.9 MV
MDC_IDC_SET_ZONE_DETECTION_INTERVAL: 350 MS
MDC_IDC_SET_ZONE_DETECTION_INTERVAL: 400 MS
MDC_IDC_SET_ZONE_TYPE: NORMAL
MDC_IDC_STAT_AT_BURDEN_PERCENT: 0 %
MDC_IDC_STAT_AT_DTM_END: NORMAL
MDC_IDC_STAT_AT_DTM_START: NORMAL
MDC_IDC_STAT_BRADY_AP_VP_PERCENT: 0.02 %
MDC_IDC_STAT_BRADY_AP_VS_PERCENT: 21.25 %
MDC_IDC_STAT_BRADY_AS_VP_PERCENT: 0.02 %
MDC_IDC_STAT_BRADY_AS_VS_PERCENT: 78.71 %
MDC_IDC_STAT_BRADY_DTM_END: NORMAL
MDC_IDC_STAT_BRADY_DTM_START: NORMAL
MDC_IDC_STAT_BRADY_RA_PERCENT_PACED: 21.26 %
MDC_IDC_STAT_BRADY_RV_PERCENT_PACED: 0.04 %
MDC_IDC_STAT_EPISODE_RECENT_COUNT: 0
MDC_IDC_STAT_EPISODE_RECENT_COUNT: 3
MDC_IDC_STAT_EPISODE_RECENT_COUNT_DTM_END: NORMAL
MDC_IDC_STAT_EPISODE_RECENT_COUNT_DTM_START: NORMAL
MDC_IDC_STAT_EPISODE_TOTAL_COUNT: 0
MDC_IDC_STAT_EPISODE_TOTAL_COUNT: 12
MDC_IDC_STAT_EPISODE_TOTAL_COUNT_DTM_END: NORMAL
MDC_IDC_STAT_EPISODE_TOTAL_COUNT_DTM_START: NORMAL
MDC_IDC_STAT_EPISODE_TYPE: NORMAL

## 2021-12-23 PROCEDURE — 93294 REM INTERROG EVL PM/LDLS PM: CPT | Performed by: INTERNAL MEDICINE

## 2021-12-23 PROCEDURE — 93296 REM INTERROG EVL PM/IDS: CPT | Performed by: INTERNAL MEDICINE

## 2022-02-16 ENCOUNTER — OFFICE VISIT (OUTPATIENT)
Dept: CARDIOLOGY | Facility: CLINIC | Age: 87
End: 2022-02-16
Payer: MEDICARE

## 2022-02-16 VITALS
SYSTOLIC BLOOD PRESSURE: 156 MMHG | BODY MASS INDEX: 27.87 KG/M2 | HEART RATE: 88 BPM | RESPIRATION RATE: 16 BRPM | DIASTOLIC BLOOD PRESSURE: 84 MMHG | WEIGHT: 138 LBS

## 2022-02-16 DIAGNOSIS — I25.10 CORONARY ARTERY DISEASE DUE TO LIPID RICH PLAQUE: Primary | ICD-10-CM

## 2022-02-16 DIAGNOSIS — I25.83 CORONARY ARTERY DISEASE DUE TO LIPID RICH PLAQUE: Primary | ICD-10-CM

## 2022-02-16 PROCEDURE — 99214 OFFICE O/P EST MOD 30 MIN: CPT | Performed by: INTERNAL MEDICINE

## 2022-02-16 RX ORDER — FUROSEMIDE 20 MG
10 TABLET ORAL DAILY
COMMUNITY

## 2022-02-16 RX ORDER — NITROGLYCERIN 0.4 MG/1
0.4 TABLET SUBLINGUAL EVERY 5 MIN PRN
COMMUNITY
Start: 2021-02-08

## 2022-02-16 RX ORDER — CHLORAL HYDRATE 500 MG
1 CAPSULE ORAL DAILY
COMMUNITY
End: 2023-02-03

## 2022-02-16 NOTE — PATIENT INSTRUCTIONS
We are going to plan a heart ultrasound and some blood work in the next few weeks at [facility].I will be in touch with results.Please call my nurse Hortensia with any heart related concerns.Her number is [phone]

## 2022-02-16 NOTE — PROGRESS NOTES
HEART CARE ENCOUNTER CONSULTATON AUGUSTINA RUSS [facility]  [phone]      Assessment/Recommendations   Assessment/Plan:  1.  Coronary artery disease.  She is not describing any anginal chest discomfort.  She does have some chronic left arm discomfort which seems more musculoskeletal and improved after a steroid injection a number of months ago.  She denies shortness of breath, orthopnea or PND.  She is not interested in pursuing follow-up stress testing but we are going to plan follow-up echocardiography given her history and chronic pacemaker.  Currently she is only on furosemide and pravastatin and aspirin for her heart condition.  Her blood pressure is noted to be mildly elevated today.  Her blood pressure was 110/70 when she saw her primary care physician a number of months ago when asked that this be continue to monitor.    2.  Dyslipidemia.  She is back on pravastatin and had discontinued this in the past.  We will plan to check fasting lipid and liver panel when she comes for the echocardiogram.  In October 2020 her cholesterol was 214 and LDL was 128 compared to 91 in September 2019.    Follow-up 6 months with plans as outlined above with echocardiogram and laboratory studies.       History of Present Illness/Subjective    HPI: Isamar Little is a 89 year old female who is seen in follow up.She has a remote history of a coronary intervention in 2010 to the diagonal and circumflex with stents at that time.  She has known occlusion of the LAD.  Nuclear stress test from October 2018 demonstrate a small area of inferoseptal ischemia with a medium sized area of transmural infarction involving the apical and anteroseptal segments with normal ejection fraction.  She has her pacemaker checked every 3 months with normal function.  She reports no complaints of chest pain or shortness of breath.  She reports no orthopnea, significant dizziness or lightheadedness.  He has some mild persistent  lower extremity edema.  We talked about whether we would do follow-up stress testing which she prefers not to do but we are going to plan follow-up echocardiogram.    Recent Echocardiogram Results:  External Result Report    External Result Report       Echocardiogram Complete  Order: [id number]   Status: Final result       Visible to patient: No (inaccessible in MyChart)       Next appt: 02/23/2022 at 09:45 AM in Cardiology ([facility] ECHO OP 2)       0 Result Notes      Details    Reading Physician Reading Date Result Priority   Magdy Mcneal  [phone] 5/15/2017 Routine   Provider, Historical 5/15/2017      Narrative & Impression    1.Left ventricle ejection fraction is normal. The calculated left ventricular ejection fraction is 63%.    2.Mild to moderate tricuspid and mild to moderate mitral regurgitation.    3.When compared to the report of the previous study dated 7/1/2008, there are changes noted. Images unavailable but apparently mild to moderate tricuspid and mitral regurgitations are new as is distal septal wall motion abnormality.             Recent Coronary Angiogram Results:    Exam Information    Exam Date Exam Time Exam Date Exam Time Accession # Performing Department Results    12/23/21  9:03 AM 12/23/21  1:56 PM [id number] [facility]        Narrative & Impression    Type: Remote Pacemaker check  Presenting Rhythm: AS/VS 92 bpm, NSR.  Lead/Battery status: Stable lead and battery measurements. Battery estimates 6 years remaining.  Arrhythmias: 1 Fast A&V episode for 8 seconds,  bpm, EGM suggests SVT. 3 NSVT episodes with durations of 1 second and -182 bpm. EGMs show SVT with 1:1 conduction for 5-20 seconds.  Comments: Normal Device Function.  Patient will be due for clinic check in 3 months with Dr Rene clinic visit.  Janell Sethi, Device RN        I have reviewed and interpreted the device interrogation, settings, programming, and encounter summary.  The device is functioning within normal device parameters. I agree with the current findings, assessment and plan     10/17/18  7:49 AM 10/17/18 10:59 AM [id number] [facility]        [id number]         PACS Images     Show images for NM NURSE PHARM STRESS    Study Result    Narrative & Impression     The pharmacologic nuclear stress test is abnormal.    There is a small area of ischemia in the inferoseptal segment(s) of the left ventricle. There is a medium sized area of transmural infarction in the apical and anteroseptal segment(s) of the left ventricle.    The left ventricular ejection fraction is 70%.    When compared to the images of 5/16/2017, on review of prior images there was a small area of inferoseptal ischemia. No change when directly compared to current study images.               Physical Examination  Review of Systems   Vitals: 156/84, weight 138 pounds, heart rate 88 and regular  Wt Readings from Last 3 Encounters:   03/24/21 63.5 kg (140 lb)   08/01/19 63.5 kg (140 lb)   04/23/19 64.6 kg (142 lb 6 oz)       General Appearance:   no distress, normal body habitus   ENT/Mouth: membranes moist, no oral lesions or bleeding gums.      EYES:  no scleral icterus, normal conjunctivae   Neck: no carotid bruits or thyromegaly   Chest/Lungs:   lungs are clear to auscultation, no rales or wheezing, , equal chest wall expansion    Cardiovascular:   Regular. Normal first and second heart sounds with soft systolic murmur, rubs, or gallops; the carotid, radial and posterior tibial pulses are intact, Jugular venous pressure not obviously increased although she is examined in the chair.,  Mild dependent edema right leg greater than left.  Edema bilaterally    Abdomen:  no organomegaly, masses, bruits, or tenderness; bowel sounds are present   Extremities: no cyanosis or clubbing   Skin: no xanthelasma, warm.    Neurologic: normal  bilateral, no tremors     Psychiatric: alert and  oriented x3, calm        Please refer above for cardiac ROS details.        Medical History  Surgical History Family History Social History   Past Medical History:   Diagnosis Date     Adjustment disorder with mixed anxiety and depressed mood      Anxiety      Anxiety     Previously on sertraline     Anxiety state, unspecified      Arthritis      Atherosclerotic heart disease of native coronary artery without angina pectoris 5/16/2017     Bereavement, uncomplicated      Cardiac pacemaker in situ 5/31/2017    [employer] Jaspal MEEKS DOI: 5/17/17 Dr. David Patel      Chronic diastolic congestive heart failure (H) 6/28/2018     Chronic edema 7/13/2020     Closed fracture of tooth with routine healing 4/23/2019     Coronary artery disease      Coronary atherosclerosis of native coronary artery      Depressive disorder, not elsewhere classified      Edema      Essential hypertension with goal blood pressure less than 140/90      Essential hypertension, benign      Gastritis      Gastroesophageal reflux disease 7/13/2020     Hyperlipidemia      Hyperlipidemia with target LDL less than 70      Hypertension      Osteoporosis without current pathological fracture, unspecified osteoporosis type 7/13/2020     Other and unspecified hyperlipidemia      Other malaise and fatigue      Paroxysmal atrial fibrillation (H)      Primary insomnia 7/13/2020     Primary osteoarthritis 7/13/2020     Skin cancer      Skin cancer      SSS (sick sinus syndrome) (H)      Stented coronary artery     STENT X 2     Past Surgical History:   Procedure Laterality Date     ANGIOPLASTY  06/2008    stents x3     CAPSULOTOMY Left 1/2014     CARDIAC SURGERY  06/11/2008    angioplasty w/stent     CATARACT EXTRACTION, BILATERAL  1/2010     DILATION AND CURETTAGE      X3     ENT SURGERY      T&A     EYE SURGERY Bilateral     IOL     EYE SURGERY Left     YAG      GYN SURGERY      D&C X 3     IMPLANT PACEMAKER       [facility]  6[facility]11/[facility]  MICROGRAPHIC PROCEDURE Left 8/19/2014    Procedure: MOHS MICROGRAPHIC PROCEDURE;  Surgeon: Hector Manjarrez MD;  Location: [facility]     TONSILLECTOMY & ADENOIDECTOMY       Family History   Problem Relation Age of Onset     Hypertension Mother      Hypertension Father      Cerebrovascular Disease Father      Kidney Disease Maternal Grandmother      Kidney Disease Maternal Grandfather      Early Death Maternal Grandfather      Heart Disease Maternal Grandfather      Kidney Disease Paternal Grandmother      Kidney Disease Paternal Grandfather      Early Death Paternal Grandfather      Heart Disease Paternal Grandfather      Coronary Artery Disease Sister      Coronary Artery Disease Brother      Dementia Brother         disabled vet        Social History     Socioeconomic History     Marital status:      Spouse name: Not on file     Number of children: Not on file     Years of education: Not on file     Highest education level: Not on file   Occupational History     Not on file   Tobacco Use     Smoking status: Never Smoker     Smokeless tobacco: Never Used   Substance and Sexual Activity     Alcohol use: No     Drug use: No     Sexual activity: Not Currently     Partners: Male     Birth control/protection: Post-menopausal   Other Topics Concern     Not on file   Social History Narrative     Not on file     Social Determinants of Health     Financial Resource Strain: Not on file   Food Insecurity: Not on file   Transportation Needs: Not on file   Physical Activity: Not on file   Stress: Not on file   Social Connections: Not on file   Intimate Partner Violence: Not on file   Housing Stability: Not on file           Medications  Allergies   Current Outpatient Medications   Medication Sig Dispense Refill     Alendronate Sodium 70 MG TBEF Take 70 mg by mouth every 7 days       ALPRAZOLAM PO Take 0.5 mg by mouth 2 times daily as needed for anxiety       ATENOLOL PO Take 12.5 mg by mouth daily       Calcium  Carb-Cholecalciferol (CALCIUM-VITAMIN D3) 600-500 MG-UNIT CAPS Take 1 tablet by mouth daily       FUROSEMIDE PO Take 20 mg by mouth daily as needed       isosorbide mononitrate (IMDUR) 30 MG 24 hr tablet Take 15 mg by mouth daily       LISINOPRIL PO Take 5 mg by mouth daily       multivitamin, therapeutic with minerals (MULTI-VITAMIN) TABS Take 1 tablet by mouth daily       pravastatin (PRAVACHOL) 40 MG tablet Take 40 mg by mouth daily       SERTRALINE HCL PO Take 25 mg by mouth daily       spironolactone-hydrochlorothiazide (ALDACTAZIDE) 25-25 MG per tablet Take 1 tablet by mouth daily         Allergies   Allergen Reactions     Cephalexin Rash          Lab Results    Chemistry/lipid CBC Cardiac Enzymes/BNP/TSH/INR   Recent Labs   Lab Test 10/02/20  1135   CHOL 214*   HDL 48*      TRIG 188*     Recent Labs   Lab Test 10/02/20  1135 09/11/19  1018 08/13/19  0817    91 154*     Recent Labs   Lab Test 10/07/21  1604      POTASSIUM 4.5   CHLORIDE 105   CO2 25      BUN 17   CR 0.82   GFRESTIMATED 62   CHANTAL 9.8     Recent Labs   Lab Test 10/07/21  1604 10/02/20  1135 12/09/19  0814   CR 0.82 0.72 0.78     No results for input(s): A1C in the last 43089 hours.       Recent Labs   Lab Test 12/09/19  0815   WBC 7.5   HGB 15.0   HCT 44.3   MCV 95        Recent Labs   Lab Test 12/09/19  0815 09/11/19  1018 04/18/19  1308   HGB 15.0 14.4 14.1    No results for input(s): TROPONINI in the last 76081 hours.  Recent Labs   Lab Test 12/09/19  0814 04/18/19  1308 06/21/18  1628   BNP 68 109 99     Recent Labs   Lab Test 09/11/19  1018   TSH 2.83     Recent Labs   Lab Test 04/18/19  1308   INR 1.07        Rufino Rene MD

## 2022-02-16 NOTE — LETTER
2/16/2022    Reynaldo Kong MD  [facility] [address]  [place] [address]    RE: Isamar Little       Dear Colleague,     I had the pleasure of seeing Isamar Little in the [facility].    HEART CARE ENCOUNTER CONSULTATON NOTE      [facility]  [phone]      Assessment/Recommendations   Assessment/Plan:  1.  Coronary artery disease.  She is not describing any anginal chest discomfort.  She does have some chronic left arm discomfort which seems more musculoskeletal and improved after a steroid injection a number of months ago.  She denies shortness of breath, orthopnea or PND.  She is not interested in pursuing follow-up stress testing but we are going to plan follow-up echocardiography given her history and chronic pacemaker.  Currently she is only on furosemide and pravastatin and aspirin for her heart condition.  Her blood pressure is noted to be mildly elevated today.  Her blood pressure was 110/70 when she saw her primary care physician a number of months ago when asked that this be continue to monitor.    2.  Dyslipidemia.  She is back on pravastatin and had discontinued this in the past.  We will plan to check fasting lipid and liver panel when she comes for the echocardiogram.  In October 2020 her cholesterol was 214 and LDL was 128 compared to 91 in September 2019.    Follow-up 6 months with plans as outlined above with echocardiogram and laboratory studies.       History of Present Illness/Subjective    HPI: Isamar Little is a 89 year old female who is seen in follow up.She has a remote history of a coronary intervention in 2010 to the diagonal and circumflex with stents at that time.  She has known occlusion of the LAD.  Nuclear stress test from October 2018 demonstrate a small area of inferoseptal ischemia with a medium sized area of transmural infarction involving the apical and anteroseptal segments with normal ejection  fraction.  She has her pacemaker checked every 3 months with normal function.  She reports no complaints of chest pain or shortness of breath.  She reports no orthopnea, significant dizziness or lightheadedness.  He has some mild persistent lower extremity edema.  We talked about whether we would do follow-up stress testing which she prefers not to do but we are going to plan follow-up echocardiogram.    Recent Echocardiogram Results:  External Result Report    External Result Report       Echocardiogram Complete  Order: [id number]   Status: Final result       Visible to patient: No (inaccessible in MyChart)       Next appt: 02/23/2022 at 09:45 AM in Cardiology ([facility] ECHO OP 2)       0 Result Notes      Details    Reading Physician Reading Date Result Priority   Magdy Mcneal  [phone] 5/15/2017 Routine   Provider, Historical 5/15/2017      Narrative & Impression    1.Left ventricle ejection fraction is normal. The calculated left ventricular ejection fraction is 63%.    2.Mild to moderate tricuspid and mild to moderate mitral regurgitation.    3.When compared to the report of the previous study dated 7/1/2008, there are changes noted. Images unavailable but apparently mild to moderate tricuspid and mitral regurgitations are new as is distal septal wall motion abnormality.             Recent Coronary Angiogram Results:    Exam Information    Exam Date Exam Time Exam Date Exam Time Accession # Performing Department Results    12/23/21  9:03 AM 12/23/21  1:56 PM [id number] [facility]        Narrative & Impression    Type: Remote Pacemaker check  Presenting Rhythm: AS/VS 92 bpm, NSR.  Lead/Battery status: Stable lead and battery measurements. Battery estimates 6 years remaining.  Arrhythmias: 1 Fast A&V episode for 8 seconds,  bpm, EGM suggests SVT. 3 NSVT episodes with durations of 1 second and -182 bpm. EGMs show SVT with 1:1 conduction for 5-20  seconds.  Comments: Normal Device Function.  Patient will be due for clinic check in 3 months with Dr Rene clinic visit.  Janell Sethi, Device RN        I have reviewed and interpreted the device interrogation, settings, programming, and encounter summary. The device is functioning within normal device parameters. I agree with the current findings, assessment and plan     10/17/18  7:49 AM 10/17/18 10:59 AM [id number] [facility]        [id number]         PACS Images     Show images for NM NURSE PHARM STRESS    Study Result    Narrative & Impression     The pharmacologic nuclear stress test is abnormal.    There is a small area of ischemia in the inferoseptal segment(s) of the left ventricle. There is a medium sized area of transmural infarction in the apical and anteroseptal segment(s) of the left ventricle.    The left ventricular ejection fraction is 70%.    When compared to the images of 5/16/2017, on review of prior images there was a small area of inferoseptal ischemia. No change when directly compared to current study images.               Physical Examination  Review of Systems   Vitals: 156/84, weight 138 pounds, heart rate 88 and regular  Wt Readings from Last 3 Encounters:   03/24/21 63.5 kg (140 lb)   08/01/19 63.5 kg (140 lb)   04/23/19 64.6 kg (142 lb 6 oz)       General Appearance:   no distress, normal body habitus   ENT/Mouth: membranes moist, no oral lesions or bleeding gums.      EYES:  no scleral icterus, normal conjunctivae   Neck: no carotid bruits or thyromegaly   Chest/Lungs:   lungs are clear to auscultation, no rales or wheezing, , equal chest wall expansion    Cardiovascular:   Regular. Normal first and second heart sounds with soft systolic murmur, rubs, or gallops; the carotid, radial and posterior tibial pulses are intact, Jugular venous pressure not obviously increased although she is examined in the chair.,  Mild dependent edema right leg greater  than left.  Edema bilaterally    Abdomen:  no organomegaly, masses, bruits, or tenderness; bowel sounds are present   Extremities: no cyanosis or clubbing   Skin: no xanthelasma, warm.    Neurologic: normal  bilateral, no tremors     Psychiatric: alert and oriented x3, calm        Please refer above for cardiac ROS details.        Medical History  Surgical History Family History Social History   Past Medical History:   Diagnosis Date     Adjustment disorder with mixed anxiety and depressed mood      Anxiety      Anxiety     Previously on sertraline     Anxiety state, unspecified      Arthritis      Atherosclerotic heart disease of native coronary artery without angina pectoris 5/16/2017     Bereavement, uncomplicated      Cardiac pacemaker in situ 5/31/2017    [employer] Advisyamil PATEL MRI DOI: 5/17/17 Dr. David Patel      Chronic diastolic congestive heart failure (H) 6/28/2018     Chronic edema 7/13/2020     Closed fracture of tooth with routine healing 4/23/2019     Coronary artery disease      Coronary atherosclerosis of native coronary artery      Depressive disorder, not elsewhere classified      Edema      Essential hypertension with goal blood pressure less than 140/90      Essential hypertension, benign      Gastritis      Gastroesophageal reflux disease 7/13/2020     Hyperlipidemia      Hyperlipidemia with target LDL less than 70      Hypertension      Osteoporosis without current pathological fracture, unspecified osteoporosis type 7/13/2020     Other and unspecified hyperlipidemia      Other malaise and fatigue      Paroxysmal atrial fibrillation (H)      Primary insomnia 7/13/2020     Primary osteoarthritis 7/13/2020     Skin cancer      Skin cancer      SSS (sick sinus syndrome) (H)      Stented coronary artery     STENT X 2     Past Surgical History:   Procedure Laterality Date     ANGIOPLASTY  06/2008    stents x3     CAPSULOTOMY Left 1/2014     CARDIAC SURGERY  06/11/2008    angioplasty w/stent      CATARACT EXTRACTION, BILATERAL  1/2010     DILATION AND CURETTAGE      X3     ENT SURGERY      T&A     EYE SURGERY Bilateral     IOL     EYE SURGERY Left     YAG      GYN SURGERY      D&C X 3     IMPLANT PACEMAKER       IR ATHERECTOMY  6/11/2008     MOHS MICROGRAPHIC PROCEDURE Left 8/19/2014    Procedure: MOHS MICROGRAPHIC PROCEDURE;  Surgeon: Hector Manjarrez MD;  Location: [facility]     TONSILLECTOMY & ADENOIDECTOMY       Family History   Problem Relation Age of Onset     Hypertension Mother      Hypertension Father      Cerebrovascular Disease Father      Kidney Disease Maternal Grandmother      Kidney Disease Maternal Grandfather      Early Death Maternal Grandfather      Heart Disease Maternal Grandfather      Kidney Disease Paternal Grandmother      Kidney Disease Paternal Grandfather      Early Death Paternal Grandfather      Heart Disease Paternal Grandfather      Coronary Artery Disease Sister      Coronary Artery Disease Brother      Dementia Brother         disabled vet        Social History     Socioeconomic History     Marital status:      Spouse name: Not on file     Number of children: Not on file     Years of education: Not on file     Highest education level: Not on file   Occupational History     Not on file   Tobacco Use     Smoking status: Never Smoker     Smokeless tobacco: Never Used   Substance and Sexual Activity     Alcohol use: No     Drug use: No     Sexual activity: Not Currently     Partners: Male     Birth control/protection: Post-menopausal   Other Topics Concern     Not on file   Social History Narrative     Not on file     Social Determinants of Health     Financial Resource Strain: Not on file   Food Insecurity: Not on file   Transportation Needs: Not on file   Physical Activity: Not on file   Stress: Not on file   Social Connections: Not on file   Intimate Partner Violence: Not on file   Housing Stability: Not on file           Medications  Allergies   Current  Outpatient Medications   Medication Sig Dispense Refill     Alendronate Sodium 70 MG TBEF Take 70 mg by mouth every 7 days       ALPRAZOLAM PO Take 0.5 mg by mouth 2 times daily as needed for anxiety       ATENOLOL PO Take 12.5 mg by mouth daily       Calcium Carb-Cholecalciferol (CALCIUM-VITAMIN D3) 600-500 MG-UNIT CAPS Take 1 tablet by mouth daily       FUROSEMIDE PO Take 20 mg by mouth daily as needed       isosorbide mononitrate (IMDUR) 30 MG 24 hr tablet Take 15 mg by mouth daily       LISINOPRIL PO Take 5 mg by mouth daily       multivitamin, therapeutic with minerals (MULTI-VITAMIN) TABS Take 1 tablet by mouth daily       pravastatin (PRAVACHOL) 40 MG tablet Take 40 mg by mouth daily       SERTRALINE HCL PO Take 25 mg by mouth daily       spironolactone-hydrochlorothiazide (ALDACTAZIDE) 25-25 MG per tablet Take 1 tablet by mouth daily         Allergies   Allergen Reactions     Cephalexin Rash          Lab Results    Chemistry/lipid CBC Cardiac Enzymes/BNP/TSH/INR   Recent Labs   Lab Test 10/02/20  1135   CHOL 214*   HDL 48*      TRIG 188*     Recent Labs   Lab Test 10/02/20  1135 09/11/19  1018 08/13/19  0817    91 154*     Recent Labs   Lab Test 10/07/21  1604      POTASSIUM 4.5   CHLORIDE 105   CO2 25      BUN 17   CR 0.82   GFRESTIMATED 62   CHANTAL 9.8     Recent Labs   Lab Test 10/07/21  1604 10/02/20  1135 12/09/19  0814   CR 0.82 0.72 0.78     No results for input(s): A1C in the last 72544 hours.       Recent Labs   Lab Test 12/09/19  0815   WBC 7.5   HGB 15.0   HCT 44.3   MCV 95        Recent Labs   Lab Test 12/09/19  0815 09/11/19  1018 04/18/19  1308   HGB 15.0 14.4 14.1    No results for input(s): TROPONINI in the last 27252 hours.  Recent Labs   Lab Test 12/09/19  0814 04/18/19  1308 06/21/18  1628   BNP 68 109 99     Recent Labs   Lab Test 09/11/19  1018   TSH 2.83     Recent Labs   Lab Test 04/18/19  1308   INR 1.07        Rufino Rene,  MD                                        Thank you for allowing me to participate in the care of your patient.      Sincerely,     Rufino Rene MD     [facility]  cc:   Referred Self  No address on file

## 2022-02-23 ENCOUNTER — HOSPITAL ENCOUNTER (OUTPATIENT)
Dept: CARDIOLOGY | Facility: CLINIC | Age: 87
Discharge: HOME | End: 2022-02-23
Attending: INTERNAL MEDICINE | Admitting: INTERNAL MEDICINE
Payer: MEDICARE

## 2022-02-23 ENCOUNTER — LAB (OUTPATIENT)
Dept: CARDIOLOGY | Facility: CLINIC | Age: 87
End: 2022-02-23
Payer: MEDICARE

## 2022-02-23 DIAGNOSIS — I25.10 CORONARY ARTERY DISEASE DUE TO LIPID RICH PLAQUE: ICD-10-CM

## 2022-02-23 DIAGNOSIS — I25.83 CORONARY ARTERY DISEASE DUE TO LIPID RICH PLAQUE: ICD-10-CM

## 2022-02-23 LAB
ALBUMIN SERPL-MCNC: 3.7 G/DL (ref 3.5–5)
ALP SERPL-CCNC: 86 U/L (ref 45–120)
ALT SERPL W P-5'-P-CCNC: 15 U/L (ref 0–45)
ANION GAP SERPL CALCULATED.3IONS-SCNC: 13 MMOL/L (ref 5–18)
AST SERPL W P-5'-P-CCNC: 22 U/L (ref 0–40)
BILIRUB SERPL-MCNC: 0.6 MG/DL (ref 0–1)
BUN SERPL-MCNC: 12 MG/DL (ref 8–28)
CALCIUM SERPL-MCNC: 9.3 MG/DL (ref 8.5–10.5)
CHLORIDE BLD-SCNC: 106 MMOL/L (ref 98–107)
CHOLEST SERPL-MCNC: 183 MG/DL
CO2 SERPL-SCNC: 25 MMOL/L (ref 22–31)
CREAT SERPL-MCNC: 0.75 MG/DL (ref 0.6–1.1)
FASTING STATUS PATIENT QL REPORTED: YES
GFR SERPL CREATININE-BSD FRML MDRD: 74 ML/MIN/1.73M2
GLUCOSE BLD-MCNC: 113 MG/DL (ref 70–125)
HDLC SERPL-MCNC: 51 MG/DL
LDLC SERPL CALC-MCNC: 107 MG/DL
LVEF ECHO: NORMAL
POTASSIUM BLD-SCNC: 4.3 MMOL/L (ref 3.5–5)
PROT SERPL-MCNC: 6.9 G/DL (ref 6–8)
SODIUM SERPL-SCNC: 144 MMOL/L (ref 136–145)
TRIGL SERPL-MCNC: 126 MG/DL

## 2022-02-23 PROCEDURE — 36415 COLL VENOUS BLD VENIPUNCTURE: CPT

## 2022-02-23 PROCEDURE — 80053 COMPREHEN METABOLIC PANEL: CPT

## 2022-02-23 PROCEDURE — 93306 TTE W/DOPPLER COMPLETE: CPT | Mod: 26 | Performed by: INTERNAL MEDICINE

## 2022-02-23 PROCEDURE — 93306 TTE W/DOPPLER COMPLETE: CPT

## 2022-02-23 PROCEDURE — 80061 LIPID PANEL: CPT

## 2022-03-04 DIAGNOSIS — E78.5 HYPERLIPIDEMIA WITH TARGET LDL LESS THAN 70: ICD-10-CM

## 2022-03-04 RX ORDER — PRAVASTATIN SODIUM 40 MG
60 TABLET ORAL AT BEDTIME
Qty: 90 TABLET | Refills: 1 | Status: SHIPPED | OUTPATIENT
Start: 2022-03-04 | End: 2023-03-06

## 2022-03-04 NOTE — PROGRESS NOTES
Rufino Rene MD   2/27/2022 12:46 PM CST         Lipid are better but above ideal goal,she is 89 but if willing we can try to cautiously increase the pravastatin to 60mg daily, watch for any muscle symptoms and recheck in 2 months.recent liver tests ok  mdg     Pravastatin dose updated.  Lipid profile, ALT, and AST ordered.  -[facility]

## 2022-04-06 ENCOUNTER — LAB REQUISITION (OUTPATIENT)
Dept: LAB | Facility: CLINIC | Age: 87
End: 2022-04-06
Payer: MEDICARE

## 2022-04-06 DIAGNOSIS — I10 ESSENTIAL (PRIMARY) HYPERTENSION: ICD-10-CM

## 2022-04-06 DIAGNOSIS — Z00.00 ENCOUNTER FOR GENERAL ADULT MEDICAL EXAMINATION WITHOUT ABNORMAL FINDINGS: ICD-10-CM

## 2022-04-06 LAB
ALBUMIN SERPL-MCNC: 3.8 G/DL (ref 3.5–5)
ALP SERPL-CCNC: 80 U/L (ref 45–120)
ALT SERPL W P-5'-P-CCNC: 18 U/L (ref 0–45)
ANION GAP SERPL CALCULATED.3IONS-SCNC: 12 MMOL/L (ref 5–18)
AST SERPL W P-5'-P-CCNC: 24 U/L (ref 0–40)
BILIRUB SERPL-MCNC: 0.6 MG/DL (ref 0–1)
BUN SERPL-MCNC: 15 MG/DL (ref 8–28)
CALCIUM SERPL-MCNC: 9.5 MG/DL (ref 8.5–10.5)
CHLORIDE BLD-SCNC: 104 MMOL/L (ref 98–107)
CHOLEST SERPL-MCNC: 175 MG/DL
CO2 SERPL-SCNC: 26 MMOL/L (ref 22–31)
CREAT SERPL-MCNC: 0.76 MG/DL (ref 0.6–1.1)
GFR SERPL CREATININE-BSD FRML MDRD: 73 ML/MIN/1.73M2
GLUCOSE BLD-MCNC: 89 MG/DL (ref 70–125)
HDLC SERPL-MCNC: 57 MG/DL
LDLC SERPL CALC-MCNC: 99 MG/DL
POTASSIUM BLD-SCNC: 4.4 MMOL/L (ref 3.5–5)
PROT SERPL-MCNC: 6.6 G/DL (ref 6–8)
SODIUM SERPL-SCNC: 142 MMOL/L (ref 136–145)
TRIGL SERPL-MCNC: 95 MG/DL

## 2022-04-06 PROCEDURE — 80053 COMPREHEN METABOLIC PANEL: CPT | Mod: ORL | Performed by: STUDENT IN AN ORGANIZED HEALTH CARE EDUCATION/TRAINING PROGRAM

## 2022-04-06 PROCEDURE — 80061 LIPID PANEL: CPT | Mod: ORL | Performed by: STUDENT IN AN ORGANIZED HEALTH CARE EDUCATION/TRAINING PROGRAM

## 2022-04-26 NOTE — PROGRESS NOTES
Review of systems are within normal limits.   Finasteride Counseling:  I discussed with the patient the risks of use of finasteride including but not limited to decreased libido, decreased ejaculate volume, gynecomastia, and depression. Women should not handle medication.  All of the patient's questions and concerns were addressed. Finasteride Male Counseling: Finasteride Counseling:  I discussed with the patient the risks of use of finasteride including but not limited to decreased libido, decreased ejaculate volume, gynecomastia, and depression. Women should not handle medication.  All of the patient's questions and concerns were addressed.

## 2022-05-09 ENCOUNTER — TELEPHONE (OUTPATIENT)
Dept: CARDIOLOGY | Facility: CLINIC | Age: 87
End: 2022-05-09
Payer: MEDICARE

## 2022-05-09 NOTE — TELEPHONE ENCOUNTER
5/9/22 Called Amy to review device check status.  Isamar, patient- was supposed to see us in clinic when she got scheduled for Dr Rene, but was not scheduled to have device check. Asked them to do remote any time this week that it works for them.'  Janell Sethi, Device RN

## 2022-05-09 NOTE — TELEPHONE ENCOUNTER
----- Message from Hortensia Velez RN sent at 5/9/2022  2:57 PM CDT -----  Regarding: time for device check  Friend Amy called on behalf of pt - they are asking if she is due for device check.    Thanks,    Hortensia Ford RN

## 2022-05-10 ENCOUNTER — ANCILLARY PROCEDURE (OUTPATIENT)
Dept: CARDIOLOGY | Facility: CLINIC | Age: 87
End: 2022-05-10
Attending: INTERNAL MEDICINE
Payer: MEDICARE

## 2022-05-10 DIAGNOSIS — I49.5 SSS (SICK SINUS SYNDROME) (H): ICD-10-CM

## 2022-05-10 DIAGNOSIS — Z95.0 CARDIAC PACEMAKER IN SITU: ICD-10-CM

## 2022-05-10 PROCEDURE — 93294 REM INTERROG EVL PM/LDLS PM: CPT | Performed by: INTERNAL MEDICINE

## 2022-05-10 PROCEDURE — 93296 REM INTERROG EVL PM/IDS: CPT | Performed by: INTERNAL MEDICINE

## 2022-05-11 LAB
MDC_IDC_EPISODE_DTM: NORMAL
MDC_IDC_EPISODE_DURATION: 1 S
MDC_IDC_EPISODE_DURATION: 3 S
MDC_IDC_EPISODE_DURATION: 3 S
MDC_IDC_EPISODE_DURATION: 7 S
MDC_IDC_EPISODE_ID: 14
MDC_IDC_EPISODE_ID: 15
MDC_IDC_EPISODE_ID: 16
MDC_IDC_EPISODE_ID: 17
MDC_IDC_EPISODE_ID: 18
MDC_IDC_EPISODE_ID: 19
MDC_IDC_EPISODE_ID: 20
MDC_IDC_EPISODE_ID: 21
MDC_IDC_EPISODE_TYPE: NORMAL
MDC_IDC_LEAD_IMPLANT_DT: NORMAL
MDC_IDC_LEAD_IMPLANT_DT: NORMAL
MDC_IDC_LEAD_LOCATION: NORMAL
MDC_IDC_LEAD_LOCATION: NORMAL
MDC_IDC_LEAD_LOCATION_DETAIL_1: NORMAL
MDC_IDC_LEAD_LOCATION_DETAIL_1: NORMAL
MDC_IDC_LEAD_MFG: NORMAL
MDC_IDC_LEAD_MFG: NORMAL
MDC_IDC_LEAD_MODEL: NORMAL
MDC_IDC_LEAD_MODEL: NORMAL
MDC_IDC_LEAD_POLARITY_TYPE: NORMAL
MDC_IDC_LEAD_POLARITY_TYPE: NORMAL
MDC_IDC_LEAD_SERIAL: NORMAL
MDC_IDC_LEAD_SERIAL: NORMAL
MDC_IDC_LEAD_SPECIAL_FUNCTION: NORMAL
MDC_IDC_LEAD_SPECIAL_FUNCTION: NORMAL
MDC_IDC_MSMT_BATTERY_DTM: NORMAL
MDC_IDC_MSMT_BATTERY_REMAINING_LONGEVITY: 78 MO
MDC_IDC_MSMT_BATTERY_RRT_TRIGGER: 2.83
MDC_IDC_MSMT_BATTERY_STATUS: NORMAL
MDC_IDC_MSMT_BATTERY_VOLTAGE: 3 V
MDC_IDC_MSMT_LEADCHNL_RA_IMPEDANCE_VALUE: 342 OHM
MDC_IDC_MSMT_LEADCHNL_RA_IMPEDANCE_VALUE: 456 OHM
MDC_IDC_MSMT_LEADCHNL_RA_PACING_THRESHOLD_AMPLITUDE: 0.88 V
MDC_IDC_MSMT_LEADCHNL_RA_PACING_THRESHOLD_PULSEWIDTH: 0.4 MS
MDC_IDC_MSMT_LEADCHNL_RA_SENSING_INTR_AMPL: 2.38 MV
MDC_IDC_MSMT_LEADCHNL_RA_SENSING_INTR_AMPL: 2.38 MV
MDC_IDC_MSMT_LEADCHNL_RV_IMPEDANCE_VALUE: 380 OHM
MDC_IDC_MSMT_LEADCHNL_RV_IMPEDANCE_VALUE: 437 OHM
MDC_IDC_MSMT_LEADCHNL_RV_PACING_THRESHOLD_AMPLITUDE: 1.12 V
MDC_IDC_MSMT_LEADCHNL_RV_PACING_THRESHOLD_PULSEWIDTH: 0.4 MS
MDC_IDC_MSMT_LEADCHNL_RV_SENSING_INTR_AMPL: 4.5 MV
MDC_IDC_MSMT_LEADCHNL_RV_SENSING_INTR_AMPL: 4.5 MV
MDC_IDC_PG_IMPLANT_DTM: NORMAL
MDC_IDC_PG_MFG: NORMAL
MDC_IDC_PG_MODEL: NORMAL
MDC_IDC_PG_SERIAL: NORMAL
MDC_IDC_PG_TYPE: NORMAL
MDC_IDC_SESS_CLINIC_NAME: NORMAL
MDC_IDC_SESS_DTM: NORMAL
MDC_IDC_SESS_TYPE: NORMAL
MDC_IDC_SET_BRADY_AT_MODE_SWITCH_RATE: 171 {BEATS}/MIN
MDC_IDC_SET_BRADY_HYSTRATE: NORMAL
MDC_IDC_SET_BRADY_LOWRATE: 60 {BEATS}/MIN
MDC_IDC_SET_BRADY_MAX_SENSOR_RATE: 130 {BEATS}/MIN
MDC_IDC_SET_BRADY_MAX_TRACKING_RATE: 130 {BEATS}/MIN
MDC_IDC_SET_BRADY_MODE: NORMAL
MDC_IDC_SET_BRADY_PAV_DELAY_LOW: 180 MS
MDC_IDC_SET_BRADY_SAV_DELAY_LOW: 150 MS
MDC_IDC_SET_LEADCHNL_RA_PACING_AMPLITUDE: 1.5 V
MDC_IDC_SET_LEADCHNL_RA_PACING_ANODE_ELECTRODE_1: NORMAL
MDC_IDC_SET_LEADCHNL_RA_PACING_ANODE_LOCATION_1: NORMAL
MDC_IDC_SET_LEADCHNL_RA_PACING_CAPTURE_MODE: NORMAL
MDC_IDC_SET_LEADCHNL_RA_PACING_CATHODE_ELECTRODE_1: NORMAL
MDC_IDC_SET_LEADCHNL_RA_PACING_CATHODE_LOCATION_1: NORMAL
MDC_IDC_SET_LEADCHNL_RA_PACING_POLARITY: NORMAL
MDC_IDC_SET_LEADCHNL_RA_PACING_PULSEWIDTH: 0.4 MS
MDC_IDC_SET_LEADCHNL_RA_SENSING_ANODE_ELECTRODE_1: NORMAL
MDC_IDC_SET_LEADCHNL_RA_SENSING_ANODE_LOCATION_1: NORMAL
MDC_IDC_SET_LEADCHNL_RA_SENSING_CATHODE_ELECTRODE_1: NORMAL
MDC_IDC_SET_LEADCHNL_RA_SENSING_CATHODE_LOCATION_1: NORMAL
MDC_IDC_SET_LEADCHNL_RA_SENSING_POLARITY: NORMAL
MDC_IDC_SET_LEADCHNL_RA_SENSING_SENSITIVITY: 0.3 MV
MDC_IDC_SET_LEADCHNL_RV_PACING_AMPLITUDE: 1.75 V
MDC_IDC_SET_LEADCHNL_RV_PACING_ANODE_ELECTRODE_1: NORMAL
MDC_IDC_SET_LEADCHNL_RV_PACING_ANODE_LOCATION_1: NORMAL
MDC_IDC_SET_LEADCHNL_RV_PACING_CAPTURE_MODE: NORMAL
MDC_IDC_SET_LEADCHNL_RV_PACING_CATHODE_ELECTRODE_1: NORMAL
MDC_IDC_SET_LEADCHNL_RV_PACING_CATHODE_LOCATION_1: NORMAL
MDC_IDC_SET_LEADCHNL_RV_PACING_POLARITY: NORMAL
MDC_IDC_SET_LEADCHNL_RV_PACING_PULSEWIDTH: 0.4 MS
MDC_IDC_SET_LEADCHNL_RV_SENSING_ANODE_ELECTRODE_1: NORMAL
MDC_IDC_SET_LEADCHNL_RV_SENSING_ANODE_LOCATION_1: NORMAL
MDC_IDC_SET_LEADCHNL_RV_SENSING_CATHODE_ELECTRODE_1: NORMAL
MDC_IDC_SET_LEADCHNL_RV_SENSING_CATHODE_LOCATION_1: NORMAL
MDC_IDC_SET_LEADCHNL_RV_SENSING_POLARITY: NORMAL
MDC_IDC_SET_LEADCHNL_RV_SENSING_SENSITIVITY: 0.9 MV
MDC_IDC_SET_ZONE_DETECTION_INTERVAL: 350 MS
MDC_IDC_SET_ZONE_DETECTION_INTERVAL: 400 MS
MDC_IDC_SET_ZONE_TYPE: NORMAL
MDC_IDC_STAT_AT_BURDEN_PERCENT: 0 %
MDC_IDC_STAT_AT_DTM_END: NORMAL
MDC_IDC_STAT_AT_DTM_START: NORMAL
MDC_IDC_STAT_BRADY_AP_VP_PERCENT: 0.02 %
MDC_IDC_STAT_BRADY_AP_VS_PERCENT: 20.72 %
MDC_IDC_STAT_BRADY_AS_VP_PERCENT: 0.02 %
MDC_IDC_STAT_BRADY_AS_VS_PERCENT: 79.24 %
MDC_IDC_STAT_BRADY_DTM_END: NORMAL
MDC_IDC_STAT_BRADY_DTM_START: NORMAL
MDC_IDC_STAT_BRADY_RA_PERCENT_PACED: 20.73 %
MDC_IDC_STAT_BRADY_RV_PERCENT_PACED: 0.04 %
MDC_IDC_STAT_EPISODE_RECENT_COUNT: 0
MDC_IDC_STAT_EPISODE_RECENT_COUNT: 7
MDC_IDC_STAT_EPISODE_RECENT_COUNT_DTM_END: NORMAL
MDC_IDC_STAT_EPISODE_RECENT_COUNT_DTM_START: NORMAL
MDC_IDC_STAT_EPISODE_TOTAL_COUNT: 0
MDC_IDC_STAT_EPISODE_TOTAL_COUNT: 19
MDC_IDC_STAT_EPISODE_TOTAL_COUNT_DTM_END: NORMAL
MDC_IDC_STAT_EPISODE_TOTAL_COUNT_DTM_START: NORMAL
MDC_IDC_STAT_EPISODE_TYPE: NORMAL

## 2022-06-24 ENCOUNTER — LAB REQUISITION (OUTPATIENT)
Dept: LAB | Facility: CLINIC | Age: 87
End: 2022-06-24
Payer: MEDICARE

## 2022-06-24 DIAGNOSIS — M25.471 EFFUSION, RIGHT ANKLE: ICD-10-CM

## 2022-06-24 LAB — D DIMER PPP FEU-MCNC: 1.13 UG/ML FEU (ref 0–0.5)

## 2022-06-24 PROCEDURE — 85379 FIBRIN DEGRADATION QUANT: CPT | Mod: ORL | Performed by: STUDENT IN AN ORGANIZED HEALTH CARE EDUCATION/TRAINING PROGRAM

## 2022-08-31 ENCOUNTER — ANCILLARY PROCEDURE (OUTPATIENT)
Dept: CARDIOLOGY | Facility: CLINIC | Age: 87
End: 2022-08-31
Attending: INTERNAL MEDICINE
Payer: MEDICARE

## 2022-08-31 DIAGNOSIS — Z95.0 CARDIAC PACEMAKER IN SITU: ICD-10-CM

## 2022-08-31 DIAGNOSIS — I49.5 SSS (SICK SINUS SYNDROME) (H): ICD-10-CM

## 2022-09-04 LAB
MDC_IDC_EPISODE_DTM: NORMAL
MDC_IDC_EPISODE_DURATION: 1 S
MDC_IDC_EPISODE_DURATION: 3 S
MDC_IDC_EPISODE_DURATION: 3 S
MDC_IDC_EPISODE_ID: 22
MDC_IDC_EPISODE_ID: 23
MDC_IDC_EPISODE_ID: 24
MDC_IDC_EPISODE_TYPE: NORMAL
MDC_IDC_LEAD_IMPLANT_DT: NORMAL
MDC_IDC_LEAD_IMPLANT_DT: NORMAL
MDC_IDC_LEAD_LOCATION: NORMAL
MDC_IDC_LEAD_LOCATION: NORMAL
MDC_IDC_LEAD_LOCATION_DETAIL_1: NORMAL
MDC_IDC_LEAD_LOCATION_DETAIL_1: NORMAL
MDC_IDC_LEAD_MFG: NORMAL
MDC_IDC_LEAD_MFG: NORMAL
MDC_IDC_LEAD_MODEL: NORMAL
MDC_IDC_LEAD_MODEL: NORMAL
MDC_IDC_LEAD_POLARITY_TYPE: NORMAL
MDC_IDC_LEAD_POLARITY_TYPE: NORMAL
MDC_IDC_LEAD_SERIAL: NORMAL
MDC_IDC_LEAD_SERIAL: NORMAL
MDC_IDC_LEAD_SPECIAL_FUNCTION: NORMAL
MDC_IDC_LEAD_SPECIAL_FUNCTION: NORMAL
MDC_IDC_MSMT_BATTERY_DTM: NORMAL
MDC_IDC_MSMT_BATTERY_REMAINING_LONGEVITY: 68 MO
MDC_IDC_MSMT_BATTERY_RRT_TRIGGER: 2.83
MDC_IDC_MSMT_BATTERY_STATUS: NORMAL
MDC_IDC_MSMT_BATTERY_VOLTAGE: 3 V
MDC_IDC_MSMT_LEADCHNL_RA_IMPEDANCE_VALUE: 342 OHM
MDC_IDC_MSMT_LEADCHNL_RA_IMPEDANCE_VALUE: 456 OHM
MDC_IDC_MSMT_LEADCHNL_RA_PACING_THRESHOLD_AMPLITUDE: 0.75 V
MDC_IDC_MSMT_LEADCHNL_RA_PACING_THRESHOLD_PULSEWIDTH: 0.4 MS
MDC_IDC_MSMT_LEADCHNL_RA_SENSING_INTR_AMPL: 3.5 MV
MDC_IDC_MSMT_LEADCHNL_RA_SENSING_INTR_AMPL: 3.5 MV
MDC_IDC_MSMT_LEADCHNL_RV_IMPEDANCE_VALUE: 380 OHM
MDC_IDC_MSMT_LEADCHNL_RV_IMPEDANCE_VALUE: 418 OHM
MDC_IDC_MSMT_LEADCHNL_RV_PACING_THRESHOLD_AMPLITUDE: 1 V
MDC_IDC_MSMT_LEADCHNL_RV_PACING_THRESHOLD_PULSEWIDTH: 0.4 MS
MDC_IDC_MSMT_LEADCHNL_RV_SENSING_INTR_AMPL: 4.38 MV
MDC_IDC_MSMT_LEADCHNL_RV_SENSING_INTR_AMPL: 4.38 MV
MDC_IDC_PG_IMPLANT_DTM: NORMAL
MDC_IDC_PG_MFG: NORMAL
MDC_IDC_PG_MODEL: NORMAL
MDC_IDC_PG_SERIAL: NORMAL
MDC_IDC_PG_TYPE: NORMAL
MDC_IDC_SESS_CLINIC_NAME: NORMAL
MDC_IDC_SESS_DTM: NORMAL
MDC_IDC_SESS_TYPE: NORMAL
MDC_IDC_SET_BRADY_AT_MODE_SWITCH_RATE: 171 {BEATS}/MIN
MDC_IDC_SET_BRADY_HYSTRATE: NORMAL
MDC_IDC_SET_BRADY_LOWRATE: 60 {BEATS}/MIN
MDC_IDC_SET_BRADY_MAX_SENSOR_RATE: 130 {BEATS}/MIN
MDC_IDC_SET_BRADY_MAX_TRACKING_RATE: 130 {BEATS}/MIN
MDC_IDC_SET_BRADY_MODE: NORMAL
MDC_IDC_SET_BRADY_PAV_DELAY_LOW: 180 MS
MDC_IDC_SET_BRADY_SAV_DELAY_LOW: 150 MS
MDC_IDC_SET_LEADCHNL_RA_PACING_AMPLITUDE: 1.5 V
MDC_IDC_SET_LEADCHNL_RA_PACING_ANODE_ELECTRODE_1: NORMAL
MDC_IDC_SET_LEADCHNL_RA_PACING_ANODE_LOCATION_1: NORMAL
MDC_IDC_SET_LEADCHNL_RA_PACING_CAPTURE_MODE: NORMAL
MDC_IDC_SET_LEADCHNL_RA_PACING_CATHODE_ELECTRODE_1: NORMAL
MDC_IDC_SET_LEADCHNL_RA_PACING_CATHODE_LOCATION_1: NORMAL
MDC_IDC_SET_LEADCHNL_RA_PACING_POLARITY: NORMAL
MDC_IDC_SET_LEADCHNL_RA_PACING_PULSEWIDTH: 0.4 MS
MDC_IDC_SET_LEADCHNL_RA_SENSING_ANODE_ELECTRODE_1: NORMAL
MDC_IDC_SET_LEADCHNL_RA_SENSING_ANODE_LOCATION_1: NORMAL
MDC_IDC_SET_LEADCHNL_RA_SENSING_CATHODE_ELECTRODE_1: NORMAL
MDC_IDC_SET_LEADCHNL_RA_SENSING_CATHODE_LOCATION_1: NORMAL
MDC_IDC_SET_LEADCHNL_RA_SENSING_POLARITY: NORMAL
MDC_IDC_SET_LEADCHNL_RA_SENSING_SENSITIVITY: 0.3 MV
MDC_IDC_SET_LEADCHNL_RV_PACING_AMPLITUDE: 1.5 V
MDC_IDC_SET_LEADCHNL_RV_PACING_ANODE_ELECTRODE_1: NORMAL
MDC_IDC_SET_LEADCHNL_RV_PACING_ANODE_LOCATION_1: NORMAL
MDC_IDC_SET_LEADCHNL_RV_PACING_CAPTURE_MODE: NORMAL
MDC_IDC_SET_LEADCHNL_RV_PACING_CATHODE_ELECTRODE_1: NORMAL
MDC_IDC_SET_LEADCHNL_RV_PACING_CATHODE_LOCATION_1: NORMAL
MDC_IDC_SET_LEADCHNL_RV_PACING_POLARITY: NORMAL
MDC_IDC_SET_LEADCHNL_RV_PACING_PULSEWIDTH: 0.4 MS
MDC_IDC_SET_LEADCHNL_RV_SENSING_ANODE_ELECTRODE_1: NORMAL
MDC_IDC_SET_LEADCHNL_RV_SENSING_ANODE_LOCATION_1: NORMAL
MDC_IDC_SET_LEADCHNL_RV_SENSING_CATHODE_ELECTRODE_1: NORMAL
MDC_IDC_SET_LEADCHNL_RV_SENSING_CATHODE_LOCATION_1: NORMAL
MDC_IDC_SET_LEADCHNL_RV_SENSING_POLARITY: NORMAL
MDC_IDC_SET_LEADCHNL_RV_SENSING_SENSITIVITY: 0.9 MV
MDC_IDC_SET_ZONE_DETECTION_INTERVAL: 350 MS
MDC_IDC_SET_ZONE_DETECTION_INTERVAL: 400 MS
MDC_IDC_SET_ZONE_TYPE: NORMAL
MDC_IDC_STAT_AT_BURDEN_PERCENT: 0 %
MDC_IDC_STAT_AT_DTM_END: NORMAL
MDC_IDC_STAT_AT_DTM_START: NORMAL
MDC_IDC_STAT_BRADY_AP_VP_PERCENT: 0.02 %
MDC_IDC_STAT_BRADY_AP_VS_PERCENT: 22.06 %
MDC_IDC_STAT_BRADY_AS_VP_PERCENT: 0.02 %
MDC_IDC_STAT_BRADY_AS_VS_PERCENT: 77.9 %
MDC_IDC_STAT_BRADY_DTM_END: NORMAL
MDC_IDC_STAT_BRADY_DTM_START: NORMAL
MDC_IDC_STAT_BRADY_RA_PERCENT_PACED: 22.07 %
MDC_IDC_STAT_BRADY_RV_PERCENT_PACED: 0.04 %
MDC_IDC_STAT_EPISODE_RECENT_COUNT: 0
MDC_IDC_STAT_EPISODE_RECENT_COUNT: 3
MDC_IDC_STAT_EPISODE_RECENT_COUNT_DTM_END: NORMAL
MDC_IDC_STAT_EPISODE_RECENT_COUNT_DTM_START: NORMAL
MDC_IDC_STAT_EPISODE_TOTAL_COUNT: 0
MDC_IDC_STAT_EPISODE_TOTAL_COUNT: 22
MDC_IDC_STAT_EPISODE_TOTAL_COUNT_DTM_END: NORMAL
MDC_IDC_STAT_EPISODE_TOTAL_COUNT_DTM_START: NORMAL
MDC_IDC_STAT_EPISODE_TYPE: NORMAL

## 2022-09-04 PROCEDURE — 93294 REM INTERROG EVL PM/LDLS PM: CPT | Performed by: INTERNAL MEDICINE

## 2022-09-04 PROCEDURE — 93296 REM INTERROG EVL PM/IDS: CPT | Performed by: INTERNAL MEDICINE

## 2022-12-07 ENCOUNTER — ANCILLARY PROCEDURE (OUTPATIENT)
Dept: CARDIOLOGY | Facility: CLINIC | Age: 87
End: 2022-12-07
Attending: INTERNAL MEDICINE
Payer: MEDICARE

## 2022-12-07 DIAGNOSIS — Z95.0 CARDIAC PACEMAKER IN SITU: ICD-10-CM

## 2022-12-07 DIAGNOSIS — I49.5 SSS (SICK SINUS SYNDROME) (H): ICD-10-CM

## 2022-12-07 LAB
MDC_IDC_EPISODE_DTM: NORMAL
MDC_IDC_EPISODE_DTM: NORMAL
MDC_IDC_EPISODE_DURATION: 1 S
MDC_IDC_EPISODE_DURATION: 1 S
MDC_IDC_EPISODE_ID: 25
MDC_IDC_EPISODE_ID: 26
MDC_IDC_EPISODE_TYPE: NORMAL
MDC_IDC_EPISODE_TYPE: NORMAL
MDC_IDC_LEAD_IMPLANT_DT: NORMAL
MDC_IDC_LEAD_IMPLANT_DT: NORMAL
MDC_IDC_LEAD_LOCATION: NORMAL
MDC_IDC_LEAD_LOCATION: NORMAL
MDC_IDC_LEAD_LOCATION_DETAIL_1: NORMAL
MDC_IDC_LEAD_LOCATION_DETAIL_1: NORMAL
MDC_IDC_LEAD_MFG: NORMAL
MDC_IDC_LEAD_MFG: NORMAL
MDC_IDC_LEAD_MODEL: NORMAL
MDC_IDC_LEAD_MODEL: NORMAL
MDC_IDC_LEAD_POLARITY_TYPE: NORMAL
MDC_IDC_LEAD_POLARITY_TYPE: NORMAL
MDC_IDC_LEAD_SERIAL: NORMAL
MDC_IDC_LEAD_SERIAL: NORMAL
MDC_IDC_LEAD_SPECIAL_FUNCTION: NORMAL
MDC_IDC_LEAD_SPECIAL_FUNCTION: NORMAL
MDC_IDC_MSMT_BATTERY_DTM: NORMAL
MDC_IDC_MSMT_BATTERY_REMAINING_LONGEVITY: 63 MO
MDC_IDC_MSMT_BATTERY_RRT_TRIGGER: 2.83
MDC_IDC_MSMT_BATTERY_STATUS: NORMAL
MDC_IDC_MSMT_BATTERY_VOLTAGE: 3 V
MDC_IDC_MSMT_LEADCHNL_RA_IMPEDANCE_VALUE: 342 OHM
MDC_IDC_MSMT_LEADCHNL_RA_IMPEDANCE_VALUE: 437 OHM
MDC_IDC_MSMT_LEADCHNL_RA_PACING_THRESHOLD_AMPLITUDE: 0.88 V
MDC_IDC_MSMT_LEADCHNL_RA_PACING_THRESHOLD_PULSEWIDTH: 0.4 MS
MDC_IDC_MSMT_LEADCHNL_RA_SENSING_INTR_AMPL: 2.25 MV
MDC_IDC_MSMT_LEADCHNL_RA_SENSING_INTR_AMPL: 2.25 MV
MDC_IDC_MSMT_LEADCHNL_RV_IMPEDANCE_VALUE: 380 OHM
MDC_IDC_MSMT_LEADCHNL_RV_IMPEDANCE_VALUE: 437 OHM
MDC_IDC_MSMT_LEADCHNL_RV_PACING_THRESHOLD_AMPLITUDE: 1.12 V
MDC_IDC_MSMT_LEADCHNL_RV_PACING_THRESHOLD_PULSEWIDTH: 0.4 MS
MDC_IDC_MSMT_LEADCHNL_RV_SENSING_INTR_AMPL: 5.75 MV
MDC_IDC_MSMT_LEADCHNL_RV_SENSING_INTR_AMPL: 5.75 MV
MDC_IDC_PG_IMPLANT_DTM: NORMAL
MDC_IDC_PG_MFG: NORMAL
MDC_IDC_PG_MODEL: NORMAL
MDC_IDC_PG_SERIAL: NORMAL
MDC_IDC_PG_TYPE: NORMAL
MDC_IDC_SESS_CLINIC_NAME: NORMAL
MDC_IDC_SESS_DTM: NORMAL
MDC_IDC_SESS_TYPE: NORMAL
MDC_IDC_SET_BRADY_AT_MODE_SWITCH_RATE: 171 {BEATS}/MIN
MDC_IDC_SET_BRADY_HYSTRATE: NORMAL
MDC_IDC_SET_BRADY_LOWRATE: 60 {BEATS}/MIN
MDC_IDC_SET_BRADY_MAX_SENSOR_RATE: 130 {BEATS}/MIN
MDC_IDC_SET_BRADY_MAX_TRACKING_RATE: 130 {BEATS}/MIN
MDC_IDC_SET_BRADY_MODE: NORMAL
MDC_IDC_SET_BRADY_PAV_DELAY_LOW: 180 MS
MDC_IDC_SET_BRADY_SAV_DELAY_LOW: 150 MS
MDC_IDC_SET_LEADCHNL_RA_PACING_AMPLITUDE: 1.5 V
MDC_IDC_SET_LEADCHNL_RA_PACING_ANODE_ELECTRODE_1: NORMAL
MDC_IDC_SET_LEADCHNL_RA_PACING_ANODE_LOCATION_1: NORMAL
MDC_IDC_SET_LEADCHNL_RA_PACING_CAPTURE_MODE: NORMAL
MDC_IDC_SET_LEADCHNL_RA_PACING_CATHODE_ELECTRODE_1: NORMAL
MDC_IDC_SET_LEADCHNL_RA_PACING_CATHODE_LOCATION_1: NORMAL
MDC_IDC_SET_LEADCHNL_RA_PACING_POLARITY: NORMAL
MDC_IDC_SET_LEADCHNL_RA_PACING_PULSEWIDTH: 0.4 MS
MDC_IDC_SET_LEADCHNL_RA_SENSING_ANODE_ELECTRODE_1: NORMAL
MDC_IDC_SET_LEADCHNL_RA_SENSING_ANODE_LOCATION_1: NORMAL
MDC_IDC_SET_LEADCHNL_RA_SENSING_CATHODE_ELECTRODE_1: NORMAL
MDC_IDC_SET_LEADCHNL_RA_SENSING_CATHODE_LOCATION_1: NORMAL
MDC_IDC_SET_LEADCHNL_RA_SENSING_POLARITY: NORMAL
MDC_IDC_SET_LEADCHNL_RA_SENSING_SENSITIVITY: 0.3 MV
MDC_IDC_SET_LEADCHNL_RV_PACING_AMPLITUDE: 1.75 V
MDC_IDC_SET_LEADCHNL_RV_PACING_ANODE_ELECTRODE_1: NORMAL
MDC_IDC_SET_LEADCHNL_RV_PACING_ANODE_LOCATION_1: NORMAL
MDC_IDC_SET_LEADCHNL_RV_PACING_CAPTURE_MODE: NORMAL
MDC_IDC_SET_LEADCHNL_RV_PACING_CATHODE_ELECTRODE_1: NORMAL
MDC_IDC_SET_LEADCHNL_RV_PACING_CATHODE_LOCATION_1: NORMAL
MDC_IDC_SET_LEADCHNL_RV_PACING_POLARITY: NORMAL
MDC_IDC_SET_LEADCHNL_RV_PACING_PULSEWIDTH: 0.4 MS
MDC_IDC_SET_LEADCHNL_RV_SENSING_ANODE_ELECTRODE_1: NORMAL
MDC_IDC_SET_LEADCHNL_RV_SENSING_ANODE_LOCATION_1: NORMAL
MDC_IDC_SET_LEADCHNL_RV_SENSING_CATHODE_ELECTRODE_1: NORMAL
MDC_IDC_SET_LEADCHNL_RV_SENSING_CATHODE_LOCATION_1: NORMAL
MDC_IDC_SET_LEADCHNL_RV_SENSING_POLARITY: NORMAL
MDC_IDC_SET_LEADCHNL_RV_SENSING_SENSITIVITY: 0.9 MV
MDC_IDC_SET_ZONE_DETECTION_INTERVAL: 350 MS
MDC_IDC_SET_ZONE_DETECTION_INTERVAL: 400 MS
MDC_IDC_SET_ZONE_TYPE: NORMAL
MDC_IDC_STAT_AT_BURDEN_PERCENT: 0 %
MDC_IDC_STAT_AT_DTM_END: NORMAL
MDC_IDC_STAT_AT_DTM_START: NORMAL
MDC_IDC_STAT_BRADY_AP_VP_PERCENT: 0.02 %
MDC_IDC_STAT_BRADY_AP_VS_PERCENT: 24.93 %
MDC_IDC_STAT_BRADY_AS_VP_PERCENT: 0.02 %
MDC_IDC_STAT_BRADY_AS_VS_PERCENT: 75.03 %
MDC_IDC_STAT_BRADY_DTM_END: NORMAL
MDC_IDC_STAT_BRADY_DTM_START: NORMAL
MDC_IDC_STAT_BRADY_RA_PERCENT_PACED: 24.94 %
MDC_IDC_STAT_BRADY_RV_PERCENT_PACED: 0.04 %
MDC_IDC_STAT_EPISODE_RECENT_COUNT: 0
MDC_IDC_STAT_EPISODE_RECENT_COUNT: 2
MDC_IDC_STAT_EPISODE_RECENT_COUNT_DTM_END: NORMAL
MDC_IDC_STAT_EPISODE_RECENT_COUNT_DTM_START: NORMAL
MDC_IDC_STAT_EPISODE_TOTAL_COUNT: 0
MDC_IDC_STAT_EPISODE_TOTAL_COUNT: 24
MDC_IDC_STAT_EPISODE_TOTAL_COUNT_DTM_END: NORMAL
MDC_IDC_STAT_EPISODE_TOTAL_COUNT_DTM_START: NORMAL
MDC_IDC_STAT_EPISODE_TYPE: NORMAL

## 2022-12-07 PROCEDURE — 93296 REM INTERROG EVL PM/IDS: CPT | Performed by: INTERNAL MEDICINE

## 2022-12-07 PROCEDURE — 93294 REM INTERROG EVL PM/LDLS PM: CPT | Performed by: INTERNAL MEDICINE

## 2023-01-06 ENCOUNTER — TRANSFERRED RECORDS (OUTPATIENT)
Dept: HEALTH INFORMATION MANAGEMENT | Facility: CLINIC | Age: 88
End: 2023-01-06

## 2023-02-02 ENCOUNTER — TELEPHONE (OUTPATIENT)
Dept: CARDIOLOGY | Facility: CLINIC | Age: 88
End: 2023-02-02
Payer: MEDICARE

## 2023-02-02 DIAGNOSIS — R42 DIZZINESS: Primary | ICD-10-CM

## 2023-02-02 NOTE — TELEPHONE ENCOUNTER
Received verbal communication from pt to speak to Amy.  Pt has been having worsening dizziness, and rare chest pain.  BP has been 120-130 / 80-90.  Pt saw PCP last month.  Call transferred to scheduling for [facility] appt.  cole

## 2023-02-02 NOTE — TELEPHONE ENCOUNTER
Health Call Center    Phone Message    May a detailed message be left on voicemail: yes     Reason for Call: Symptoms or Concerns     If patient has red-flag symptoms, warm transfer to triage line    Current symptom or concern: Dizzy and unsteady - periodically.    Symptoms have been present for:  one month(s)    Has patient previously been seen for this? No    Are there any new or worsening symptoms? Yes: New / present on occasion    Patient sees Dr Rene and she has a device. She has had remote device checks but I don't see an in clinic device check. Please review; patients friend Holger would like the patient scheduled to see Dr Rene and have any needed testing done due to her symptoms.      Action Taken: Other: Cardiology    Travel Screening: Not Applicable

## 2023-02-03 ENCOUNTER — OFFICE VISIT (OUTPATIENT)
Dept: CARDIOLOGY | Facility: CLINIC | Age: 88
End: 2023-02-03
Payer: MEDICARE

## 2023-02-03 VITALS
BODY MASS INDEX: 27.72 KG/M2 | HEIGHT: 59 IN | SYSTOLIC BLOOD PRESSURE: 122 MMHG | WEIGHT: 137.5 LBS | DIASTOLIC BLOOD PRESSURE: 72 MMHG | RESPIRATION RATE: 16 BRPM | HEART RATE: 96 BPM

## 2023-02-03 DIAGNOSIS — R42 ORTHOSTATIC LIGHTHEADEDNESS: Primary | ICD-10-CM

## 2023-02-03 DIAGNOSIS — I49.5 SSS (SICK SINUS SYNDROME) (H): ICD-10-CM

## 2023-02-03 DIAGNOSIS — I50.32 CHRONIC HEART FAILURE WITH PRESERVED EJECTION FRACTION (HFPEF) (H): ICD-10-CM

## 2023-02-03 PROCEDURE — 99214 OFFICE O/P EST MOD 30 MIN: CPT | Performed by: INTERNAL MEDICINE

## 2023-02-03 RX ORDER — IMIQUIMOD 12.5 MG/.25G
CREAM TOPICAL
COMMUNITY
Start: 2023-01-20 | End: 2023-02-18

## 2023-02-03 RX ORDER — ESCITALOPRAM OXALATE 10 MG/1
10 TABLET ORAL DAILY
COMMUNITY
Start: 2023-01-09

## 2023-02-03 RX ORDER — LANOLIN ALCOHOL/MO/W.PET/CERES
3 CREAM (GRAM) TOPICAL
COMMUNITY
Start: 2023-01-09 | End: 2023-03-03

## 2023-02-03 NOTE — PROGRESS NOTES
"  HEART CARE ENCOUNTER CONSULTATON [facility]  [phone]      Assessment/Recommendations   Assessment:   1.  Orthostatic lightheadedness, likely related to drop in blood pressure with standing and changing position  2.  Chronic congestive heart failure with preserved ejection fraction  3.  Tricuspid regurgitation, +2 on echo  4.  Permanent pacemaker related to sick sinus syndrome    Plan:   1.  Decrease Lasix to 10 mg daily  2.  Continue aspirin  3.  Advised patient to stand up slowly.  4.  Continue with increased protein intake    Outpatient cardiologist Dr. Rene       History of Present Illness/Subjective    HPI: Isamar Little is a 89 year old female who presents to cardiology rapid access clinic for a few months of lightheadedness symptoms.    According to the patient and her daughter she has been noticing lightheadedness when she sits up in the morning and also when she stands up from a seated position.  Blood pressure today was 120/72.  She is on Lasix therapy.  She does try to hydrate throughout the day.  She does not feel any lightheadedness symptoms when she is laying flat in bed.  She is not having lightheadedness symptoms when she sitting.  She does note some lightheadedness symptoms when she has prolonged standing.  Her symptoms are consistent with orthostasis.  Likely secondary to drop in blood pressure with prolonged standing.  This could be secondary to autonomic dysfunction related aging along with medications.  We will decrease Lasix.  She is chronic lower extremity edema which is 1-2+.  Edema is likely secondary to her tricuspid regurgitation as she has a RV lead.    Most recent note by Dr. Rene was reviewed.       Physical Examination  Review of Systems   Vitals: /72 (BP Location: Left arm, Patient Position: Sitting, Cuff Size: Adult Regular)   Pulse 96   Resp 16   Ht 1.499 m (4' 11\")   Wt 62.4 kg (137 lb 8 oz)   BMI 27.77 kg/m    BMI= Body mass index " is 27.77 kg/m .  Wt Readings from Last 3 Encounters:   02/03/23 62.4 kg (137 lb 8 oz)   02/16/22 62.6 kg (138 lb)   03/24/21 63.5 kg (140 lb)        Pleasant   ENT/Mouth: membranes moist, no oral lesions or bleeding gums.      EYES:  no scleral icterus, normal conjunctivae       Chest/Lungs:   lungs are clear to auscultation, no rales or wheezing, no sternal scar, equal chest wall expansion, device in left upper chest wall   Cardiovascular:   Regular. Normal first and second heart sounds with faint systolic murmur. No rubs, or gallops; the carotid, radial and posterior tibial pulses are intact, Jugular venous pressure noraml, mild edema bilaterally    Abdomen:  no tenderness; bowel sounds are present   Extremities: no cyanosis or clubbing   Skin: no xanthelasma, warm.    Neurologic: normal  bilateral, no tremors     Psychiatric: alert and oriented x3, calm        Please refer above for cardiac ROS details.        Medical History  Surgical History Family History Social History   Past Medical History:   Diagnosis Date     Adjustment disorder with mixed anxiety and depressed mood      Anxiety      Anxiety     Previously on sertraline     Anxiety state, unspecified      Arthritis      Atherosclerotic heart disease of native coronary artery without angina pectoris 5/16/2017     Bereavement, uncomplicated      Cardiac pacemaker in situ 5/31/2017    [employer] Jaspal MEEKS DOI: 5/17/17 Dr. David Patel      Chronic diastolic congestive heart failure (H) 6/28/2018     Chronic edema 7/13/2020     Closed fracture of tooth with routine healing 4/23/2019     Coronary artery disease      Coronary atherosclerosis of native coronary artery      Depressive disorder, not elsewhere classified      Edema      Essential hypertension with goal blood pressure less than 140/90      Essential hypertension, benign      Gastritis      Gastroesophageal reflux disease 7/13/2020     Hyperlipidemia      Hyperlipidemia with target LDL less  than 70      Hypertension      Osteoporosis without current pathological fracture, unspecified osteoporosis type 7/13/2020     Other and unspecified hyperlipidemia      Other malaise and fatigue      Paroxysmal atrial fibrillation (H)      Primary insomnia 7/13/2020     Primary osteoarthritis 7/13/2020     Skin cancer      Skin cancer      SSS (sick sinus syndrome) (H)      Stented coronary artery     STENT X 2     Past Surgical History:   Procedure Laterality Date     ANGIOPLASTY  06/2008    stents x3     CAPSULOTOMY Left 1/2014     CARDIAC SURGERY  06/11/2008    angioplasty w/stent     CATARACT EXTRACTION, BILATERAL  1/2010     DILATION AND CURETTAGE      X3     ENT SURGERY      T&A     EYE SURGERY Bilateral     IOL     EYE SURGERY Left     YAG      GYN SURGERY      D&C X 3     IMPLANT PACEMAKER       IR ATHERECTOMY  6/11/2008     MOHS MICROGRAPHIC PROCEDURE Left 8/19/2014    Procedure: MOHS MICROGRAPHIC PROCEDURE;  Surgeon: Hector Manjarrze MD;  Location: [facility]     TONSILLECTOMY & ADENOIDECTOMY       Family History   Problem Relation Age of Onset     Hypertension Mother      Hypertension Father      Cerebrovascular Disease Father      Kidney Disease Maternal Grandmother      Kidney Disease Maternal Grandfather      Early Death Maternal Grandfather      Heart Disease Maternal Grandfather      Kidney Disease Paternal Grandmother      Kidney Disease Paternal Grandfather      Early Death Paternal Grandfather      Heart Disease Paternal Grandfather      Coronary Artery Disease Sister      Coronary Artery Disease Brother      Dementia Brother         disabled vet        Social History     Socioeconomic History     Marital status:      Spouse name: Not on file     Number of children: Not on file     Years of education: Not on file     Highest education level: Not on file   Occupational History     Not on file   Tobacco Use     Smoking status: Never     Smokeless tobacco: Never   Substance and Sexual Activity      Alcohol use: No     Drug use: No     Sexual activity: Not Currently     Partners: Male     Birth control/protection: Post-menopausal   Other Topics Concern     Not on file   Social History Narrative     Not on file     Social Determinants of Health     Financial Resource Strain: Not on file   Food Insecurity: Not on file   Transportation Needs: Not on file   Physical Activity: Not on file   Stress: Not on file   Social Connections: Not on file   Intimate Partner Violence: Not on file   Housing Stability: Not on file           Medications  Allergies   Current Outpatient Medications   Medication Sig Dispense Refill     escitalopram (LEXAPRO) 10 MG tablet Take 1 tablet by mouth daily at 2 pm       furosemide (LASIX) 20 MG tablet Take 1 tablet by mouth daily       imiquimod (ALDARA) 5 % external cream Apply topically to affected areas 5 days per week for 6 weeks. Treat nose first.       melatonin 3 MG tablet TAKE 1 TABLET BY MOUTH ONCE DAILY AT BEDTIME AS NEEDED.       pravastatin (PRAVACHOL) 40 MG tablet Take 1.5 tablets (60 mg) by mouth At Bedtime 90 tablet 1     aspirin (ASA) 81 MG EC tablet Take 81 mg by mouth daily (Patient not taking: Reported on 2/3/2023)       nitroGLYcerin (NITROSTAT) 0.4 MG sublingual tablet Place 0.4 mg under the tongue as needed (Patient not taking: Reported on 2/3/2023)         Allergies   Allergen Reactions     Cephalexin Rash          Lab Results    Chemistry/lipid CBC Cardiac Enzymes/BNP/TSH/INR   Recent Labs   Lab Test 04/06/22  1115   CHOL 175   HDL 57   LDL 99   TRIG 95     Recent Labs   Lab Test 04/06/22  1115 02/23/22  0903 10/02/20  1135   LDL 99 107 128     Recent Labs   Lab Test 04/06/22  1115      POTASSIUM 4.4   CHLORIDE 104   CO2 26   GLC 89   BUN 15   CR 0.76   GFRESTIMATED 73   CHANTAL 9.5     Recent Labs   Lab Test 04/06/22  1115 02/23/22  0903 10/07/21  1604   CR 0.76 0.75 0.82     No results for input(s): A1C in the last 48938 hours.       Recent Labs   Lab Test  12/09/19  0815   WBC 7.5   HGB 15.0   HCT 44.3   MCV 95        Recent Labs   Lab Test 12/09/19  0815 09/11/19  1018 04/18/19  1308   HGB 15.0 14.4 14.1    No results for input(s): TROPONINI in the last 81676 hours.  Recent Labs   Lab Test 12/09/19  0814 04/18/19  1308 06/21/18  1628   BNP 68 109 99     Recent Labs   Lab Test 09/11/19  1018   TSH 2.83     Recent Labs   Lab Test 04/18/19  1308   INR 1.07        Brock Ramirez DO      Today's clinic visit entailed:  34 minutes spent on the date of the encounter doing chart review, history and exam, documentation and further activities per the note.

## 2023-02-03 NOTE — LETTER
2/3/2023    Reynaldo Kong MD  [address]  [place] [address]    RE: Isamar Little       Dear Colleague,     [facility] the [facility].    HEART CARE ENCOUNTER CONSULTATON NOTE      [facility]  [phone]      Assessment/Recommendations   Assessment:   1.  Orthostatic lightheadedness, likely related to drop in blood pressure with standing and changing position  2.  Chronic congestive heart failure with preserved ejection fraction  3.  Tricuspid regurgitation, +2 on echo  4.  Permanent pacemaker related to sick sinus syndrome    Plan:   1.  Decrease Lasix to 10 mg daily  2.  Continue aspirin  3.  Advised patient to stand up slowly.  4.  Continue with increased protein intake    Outpatient cardiologist Dr. Rnee       History of Present Illness/Subjective    HPI: Isamar Little is a 89 year old female who presents to cardiology rapid access clinic for a few months of lightheadedness symptoms.    According to the patient and her daughter she has been noticing lightheadedness when she sits up in the morning and also when she stands up from a seated position.  Blood pressure today was 120/72.  She is on Lasix therapy.  She does try to hydrate throughout the day.  She does not feel any lightheadedness symptoms when she is laying flat in bed.  She is not having lightheadedness symptoms when she sitting.  She does note some lightheadedness symptoms when she has prolonged standing.  Her symptoms are consistent with orthostasis.  Likely secondary to drop in blood pressure with prolonged standing.  This could be secondary to autonomic dysfunction related aging along with medications.  We will decrease Lasix.  She is chronic lower extremity edema which is 1-2+.  Edema is likely secondary to her tricuspid regurgitation as she has a RV lead.    Most recent note by Dr. Rene was reviewed.       Physical Examination  Review of  "Systems   Vitals: /72 (BP Location: Left arm, Patient Position: Sitting, Cuff Size: Adult Regular)   Pulse 96   Resp 16   Ht 1.499 m (4' 11\")   Wt 62.4 kg (137 lb 8 oz)   BMI 27.77 kg/m    BMI= Body mass index is 27.77 kg/m .  Wt Readings from Last 3 Encounters:   02/03/23 62.4 kg (137 lb 8 oz)   02/16/22 62.6 kg (138 lb)   03/24/21 63.5 kg (140 lb)        Pleasant   ENT/Mouth: membranes moist, no oral lesions or bleeding gums.      EYES:  no scleral icterus, normal conjunctivae       Chest/Lungs:   lungs are clear to auscultation, no rales or wheezing, no sternal scar, equal chest wall expansion, device in left upper chest wall   Cardiovascular:   Regular. Normal first and second heart sounds with faint systolic murmur. No rubs, or gallops; the carotid, radial and posterior tibial pulses are intact, Jugular venous pressure noraml, mild edema bilaterally    Abdomen:  no tenderness; bowel sounds are present   Extremities: no cyanosis or clubbing   Skin: no xanthelasma, warm.    Neurologic: normal  bilateral, no tremors     Psychiatric: alert and oriented x3, calm        Please refer above for cardiac ROS details.        Medical History  Surgical History Family History Social History   Past Medical History:   Diagnosis Date     Adjustment disorder with mixed anxiety and depressed mood      Anxiety      Anxiety     Previously on sertraline     Anxiety state, unspecified      Arthritis      Atherosclerotic heart disease of native coronary artery without angina pectoris 5/16/2017     Bereavement, uncomplicated      Cardiac pacemaker in situ 5/31/2017    [employer] Jaspal MEEKS DOI: 5/17/17 Dr. David Patel      Chronic diastolic congestive heart failure (H) 6/28/2018     Chronic edema 7/13/2020     Closed fracture of tooth with routine healing 4/23/2019     Coronary artery disease      Coronary atherosclerosis of native coronary artery      Depressive disorder, not elsewhere classified      Edema      " Essential hypertension with goal blood pressure less than 140/90      Essential hypertension, benign      Gastritis      Gastroesophageal reflux disease 7/13/2020     Hyperlipidemia      Hyperlipidemia with target LDL less than 70      Hypertension      Osteoporosis without current pathological fracture, unspecified osteoporosis type 7/13/2020     Other and unspecified hyperlipidemia      Other malaise and fatigue      Paroxysmal atrial fibrillation (H)      Primary insomnia 7/13/2020     Primary osteoarthritis 7/13/2020     Skin cancer      Skin cancer      SSS (sick sinus syndrome) (H)      Stented coronary artery     STENT X 2     Past Surgical History:   Procedure Laterality Date     ANGIOPLASTY  06/2008    stents x3     CAPSULOTOMY Left 1/2014     CARDIAC SURGERY  06/11/2008    angioplasty w/stent     CATARACT EXTRACTION, BILATERAL  1/2010     DILATION AND CURETTAGE      X3     ENT SURGERY      T&A     EYE SURGERY Bilateral     IOL     EYE SURGERY Left     YAG      GYN SURGERY      D&C X 3     IMPLANT PACEMAKER       IR ATHERECTOMY  6/11/2008     MOHS MICROGRAPHIC PROCEDURE Left 8/19/2014    Procedure: MOHS MICROGRAPHIC PROCEDURE;  Surgeon: Hector Manjarrez MD;  Location: [facility]     TONSILLECTOMY & ADENOIDECTOMY       Family History   Problem Relation Age of Onset     Hypertension Mother      Hypertension Father      Cerebrovascular Disease Father      Kidney Disease Maternal Grandmother      Kidney Disease Maternal Grandfather      Early Death Maternal Grandfather      Heart Disease Maternal Grandfather      Kidney Disease Paternal Grandmother      Kidney Disease Paternal Grandfather      Early Death Paternal Grandfather      Heart Disease Paternal Grandfather      Coronary Artery Disease Sister      Coronary Artery Disease Brother      Dementia Brother         disabled vet        Social History     Socioeconomic History     Marital status:      Spouse name: Not on file     Number of children: Not  on file     Years of education: Not on file     Highest education level: Not on file   Occupational History     Not on file   Tobacco Use     Smoking status: Never     Smokeless tobacco: Never   Substance and Sexual Activity     Alcohol use: No     Drug use: No     Sexual activity: Not Currently     Partners: Male     Birth control/protection: Post-menopausal   Other Topics Concern     Not on file   Social History Narrative     Not on file     Social Determinants of Health     Financial Resource Strain: Not on file   Food Insecurity: Not on file   Transportation Needs: Not on file   Physical Activity: Not on file   Stress: Not on file   Social Connections: Not on file   Intimate Partner Violence: Not on file   Housing Stability: Not on file           Medications  Allergies   Current Outpatient Medications   Medication Sig Dispense Refill     escitalopram (LEXAPRO) 10 MG tablet Take 1 tablet by mouth daily at 2 pm       furosemide (LASIX) 20 MG tablet Take 1 tablet by mouth daily       imiquimod (ALDARA) 5 % external cream Apply topically to affected areas 5 days per week for 6 weeks. Treat nose first.       melatonin 3 MG tablet TAKE 1 TABLET BY MOUTH ONCE DAILY AT BEDTIME AS NEEDED.       pravastatin (PRAVACHOL) 40 MG tablet Take 1.5 tablets (60 mg) by mouth At Bedtime 90 tablet 1     aspirin (ASA) 81 MG EC tablet Take 81 mg by mouth daily (Patient not taking: Reported on 2/3/2023)       nitroGLYcerin (NITROSTAT) 0.4 MG sublingual tablet Place 0.4 mg under the tongue as needed (Patient not taking: Reported on 2/3/2023)         Allergies   Allergen Reactions     Cephalexin Rash          Lab Results    Chemistry/lipid CBC Cardiac Enzymes/BNP/TSH/INR   Recent Labs   Lab Test 04/06/22  1115   CHOL 175   HDL 57   LDL 99   TRIG 95     Recent Labs   Lab Test 04/06/22  1115 02/23/22  0903 10/02/20  1135   LDL 99 107 128     Recent Labs   Lab Test 04/06/22  1115      POTASSIUM 4.4   CHLORIDE 104   CO2 26   GLC 89    BUN 15   CR 0.76   GFRESTIMATED 73   CHANTAL 9.5     Recent Labs   Lab Test 04/06/22  1115 02/23/22  0903 10/07/21  1604   CR 0.76 0.75 0.82     No results for input(s): A1C in the last 62134 hours.       Recent Labs   Lab Test 12/09/19  0815   WBC 7.5   HGB 15.0   HCT 44.3   MCV 95        Recent Labs   Lab Test 12/09/19  0815 09/11/19  1018 04/18/19  1308   HGB 15.0 14.4 14.1    No results for input(s): TROPONINI in the last 72739 hours.  Recent Labs   Lab Test 12/09/19  0814 04/18/19  1308 06/21/18  1628   BNP 68 109 99     Recent Labs   Lab Test 09/11/19  1018   TSH 2.83     Recent Labs   Lab Test 04/18/19  1308   INR 1.07        Brock Ramirez DO      Today's clinic visit entailed:  34 minutes spent on the date of the encounter doing chart review, history and exam, documentation and further activities per the note.       Thank you for allowing me to participate in the care of your patient.      Sincerely,     Brock Ramirez DO     [facility]  cc:   Rufino Rene MD  [address]  [place] [address]

## 2023-02-03 NOTE — PATIENT INSTRUCTIONS
Please contact direct nurses line Monday through Friday 8 AM to 5 PM @ [phone]  After-hours contact cardiology office at [phone].    Decrease lasix to 10 mg daily (1/2 pill).

## 2023-02-15 ENCOUNTER — HOSPITAL ENCOUNTER (EMERGENCY)
Facility: HOSPITAL | Age: 88
Discharge: HOME | End: 2023-02-15
Attending: EMERGENCY MEDICINE | Admitting: EMERGENCY MEDICINE
Payer: MEDICARE

## 2023-02-15 ENCOUNTER — APPOINTMENT (OUTPATIENT)
Dept: CT IMAGING | Facility: HOSPITAL | Age: 88
End: 2023-02-15
Payer: MEDICARE

## 2023-02-15 VITALS
SYSTOLIC BLOOD PRESSURE: 130 MMHG | OXYGEN SATURATION: 96 % | DIASTOLIC BLOOD PRESSURE: 87 MMHG | HEIGHT: 59 IN | WEIGHT: 137 LBS | BODY MASS INDEX: 27.62 KG/M2 | RESPIRATION RATE: 18 BRPM | TEMPERATURE: 98.8 F | HEART RATE: 91 BPM
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
## 2023-02-15 DIAGNOSIS — S09.90XA HEAD INJURY, INITIAL ENCOUNTER: ICD-10-CM

## 2023-02-15 LAB
ANION GAP SERPL CALCULATED.3IONS-SCNC: 12 MMOL/L (ref 7–15)
BUN SERPL-MCNC: 14.6 MG/DL (ref 8–23)
CALCIUM SERPL-MCNC: 9 MG/DL (ref 8.2–9.6)
CHLORIDE SERPL-SCNC: 104 MMOL/L (ref 98–107)
CREAT SERPL-MCNC: 0.62 MG/DL (ref 0.51–0.95)
DEPRECATED HCO3 PLAS-SCNC: 23 MMOL/L (ref 22–29)
ERYTHROCYTE [DISTWIDTH] IN BLOOD BY AUTOMATED COUNT: 13.6 % (ref 10–15)
GFR SERPL CREATININE-BSD FRML MDRD: 82 ML/MIN/1.73M2
GLUCOSE SERPL-MCNC: 115 MG/DL (ref 70–99)
HCT VFR BLD AUTO: 45.7 % (ref 35–47)
HGB BLD-MCNC: 15.2 G/DL (ref 11.7–15.7)
MAGNESIUM SERPL-MCNC: 2.3 MG/DL (ref 1.7–2.3)
MCH RBC QN AUTO: 31.5 PG (ref 26.5–33)
MCHC RBC AUTO-ENTMCNC: 33.3 G/DL (ref 31.5–36.5)
MCV RBC AUTO: 95 FL (ref 78–100)
PLATELET # BLD AUTO: 321 10E3/UL (ref 150–450)
POTASSIUM SERPL-SCNC: 3.7 MMOL/L (ref 3.4–5.3)
RBC # BLD AUTO: 4.82 10E6/UL (ref 3.8–5.2)
SODIUM SERPL-SCNC: 139 MMOL/L (ref 136–145)
WBC # BLD AUTO: 8.2 10E3/UL (ref 4–11)

## 2023-02-15 PROCEDURE — 90471 IMMUNIZATION ADMIN: CPT

## 2023-02-15 PROCEDURE — G1010 CDSM STANSON: HCPCS

## 2023-02-15 PROCEDURE — 250N000011 HC RX IP 250 OP 636

## 2023-02-15 PROCEDURE — 93005 ELECTROCARDIOGRAM TRACING: CPT

## 2023-02-15 PROCEDURE — 72131 CT LUMBAR SPINE W/O DYE: CPT | Mod: ME

## 2023-02-15 PROCEDURE — 83735 ASSAY OF MAGNESIUM: CPT

## 2023-02-15 PROCEDURE — 85027 COMPLETE CBC AUTOMATED: CPT

## 2023-02-15 PROCEDURE — 80048 BASIC METABOLIC PNL TOTAL CA: CPT

## 2023-02-15 PROCEDURE — 90714 TD VACC NO PRESV 7 YRS+ IM: CPT

## 2023-02-15 PROCEDURE — 99285 EMERGENCY DEPT VISIT HI MDM: CPT | Mod: 25

## 2023-02-15 PROCEDURE — 36415 COLL VENOUS BLD VENIPUNCTURE: CPT

## 2023-02-15 RX ADMIN — CLOSTRIDIUM TETANI TOXOID ANTIGEN (FORMALDEHYDE INACTIVATED) AND CORYNEBACTERIUM DIPHTHERIAE TOXOID ANTIGEN (FORMALDEHYDE INACTIVATED) 0.5 ML: 5; 2 INJECTION, SUSPENSION INTRAMUSCULAR at 19:27

## 2023-02-15 ASSESSMENT — ENCOUNTER SYMPTOMS
DIFFICULTY URINATING: 0
HEADACHES: 0
SPEECH DIFFICULTY: 0
WOUND: 0
NAUSEA: 0
ABDOMINAL PAIN: 0
FLANK PAIN: 0
NUMBNESS: 0
WEAKNESS: 0
NECK PAIN: 0
CHILLS: 0
DYSURIA: 0
FEVER: 0
PHOTOPHOBIA: 0
BACK PAIN: 1
VOMITING: 0
DIZZINESS: 0
LIGHT-HEADEDNESS: 0
FREQUENCY: 0
NECK STIFFNESS: 0
SHORTNESS OF BREATH: 0

## 2023-02-15 ASSESSMENT — ACTIVITIES OF DAILY LIVING (ADL): ADLS_ACUITY_SCORE: 35

## 2023-02-15 NOTE — ED TRIAGE NOTES
"Pt arrived via [employer]. Pt \"mis stepped\" and fell hitting the back of her head. Pt denies visual changes, nausea, or head pain. Initially medics state pt is on blood thinners but pt denies. Pt is tearful in family.      Triage Assessment     Row Name 02/15/23 [id number]       Triage Assessment (Adult)    Airway WDL WDL       Respiratory WDL    Respiratory WDL WDL       Skin Circulation/Temperature WDL    Skin Circulation/Temperature WDL WDL       Cardiac WDL    Cardiac WDL WDL       Peripheral/Neurovascular WDL    Peripheral Neurovascular WDL WDL       Cognitive/Neuro/Behavioral WDL    Cognitive/Neuro/Behavioral WDL WDL              "

## 2023-02-15 NOTE — ED PROVIDER NOTES
Emergency Department Midlevel Supervisory Note     I personally saw the patient and performed a substantive portion of the visit including all aspects of the medical decision making.    ED Course:  5:40 PM Yesenia Ulrich PA-C staffed patient with me. I agree with their assessment and plan of management, and I will see the patient.  5:44 PM I met with the patient to introduce myself, gather additional history, perform my initial exam, and discuss the plan.     The patient presented to the emergency department today after falling at home.  She states that she lost her balance and fell backwards.  She struck her head but did not lose consciousness.  She has a moderate-sized posterior scalp hematoma but no open laceration.  CT imaging of the head, neck, and low back were obtained showing no signs of acute injuries.  The patient is able to ambulate safely in the emergency department and I feel she can be safely discharged home.  She is comfortable with this plan.         Diagnosis:  1. Fall, initial encounter    2. Head injury, initial encounter        Labs and Imaging:  Results for orders placed or performed during the hospital encounter of 02/15/23   Head CT w/o contrast    Impression    IMPRESSION:  1.  No CT evidence for acute intracranial process.  2.  Brain atrophy and presumed chronic microvascular ischemic changes as above.   Cervical spine CT w/o contrast    Impression    IMPRESSION:  1.  No fracture or posttraumatic subluxation.  2.  Multilevel cervical spondylosis   Lumbar spine CT w/o contrast    Impression    IMPRESSION:  1.  Stable chronic low-grade compression superior endplate L4 from prior plain film imaging 05/03/2016. Stable mild retropulsion. Stable low-grade degenerative subluxation L3 upon L4. No high-grade compressions or high-grade subluxation.    2.  No acute lumbar fracture or posttraumatic subluxation.    3.  No high-grade spinal canal or neural foraminal stenosis. Multilevel mild to  moderate spinal stenosis and/or foraminal compromise most marked at L3-L4 level.    4.  Demineralization and advanced degenerative changes throughout the lumbar spine including interspace narrowing throughout the visualized lower thoracic and lumbar levels.    5.  Cardiac pacer is present.   CBC (+ platelets, no diff)   Result Value Ref Range    WBC Count 8.2 4.0 - 11.0 10e3/uL    RBC Count 4.82 3.80 - 5.20 10e6/uL    Hemoglobin 15.2 11.7 - 15.7 g/dL    Hematocrit 45.7 35.0 - 47.0 %    MCV 95 78 - 100 fL    MCH 31.5 26.5 - 33.0 pg    MCHC 33.3 31.5 - 36.5 g/dL    RDW 13.6 10.0 - 15.0 %    Platelet Count 321 150 - 450 10e3/uL   Basic metabolic panel   Result Value Ref Range    Sodium 139 136 - 145 mmol/L    Potassium 3.7 3.4 - 5.3 mmol/L    Chloride 104 98 - 107 mmol/L    Carbon Dioxide (CO2) 23 22 - 29 mmol/L    Anion Gap 12 7 - 15 mmol/L    Urea Nitrogen 14.6 8.0 - 23.0 mg/dL    Creatinine 0.62 0.51 - 0.95 mg/dL    Calcium 9.0 8.2 - 9.6 mg/dL    Glucose 115 (H) 70 - 99 mg/dL    GFR Estimate 82 >60 mL/min/1.73m2   Result Value Ref Range    Magnesium 2.3 1.7 - 2.3 mg/dL     I have reviewed the relevant laboratory and radiology studies        I, Stanton Hannah, am serving as a scribe to document services personally performed by Dr. Anders based on my observation and the provider's statements to me. I, Nicolas Anders, DO attest that Stanton Hannah is acting in a scribe capacity, has observed my performance of the services and has documented them in accordance with my direction.     Nicolas Anders DO  Emergency Medicine  [facility] EMERGENCY DEPARTMENT  [facility] [facility] [address] [phone]  Dept: [phone]       Nicolas Anders MD  02/16/23 0036

## 2023-02-15 NOTE — ED PROVIDER NOTES
EMERGENCY DEPARTMENT ENCOUNTER      NAME: Isamar Little  AGE: 89 year old female  YOB: 1928  MRN: [id number]  EVALUATION DATE & TIME: No admission date for patient encounter.    PCP: Reynaldo Kong    ED PROVIDER: Yesenia Ulrich PA-C    Chief Complaint   Patient presents with     Fall     Head Injury     FINAL IMPRESSION:  1. Fall, initial encounter    2. Head injury, initial encounter      MEDICAL DECISION MAKING:    Pertinent Labs & Imaging studies reviewed. (See chart for details)  Isamar Little is a 89 year old female who presents for evaluation of mechanical fall 1 hour prior to evaluation.  Patient reports she was walking around her neighbors kitchen counter when she lost her balance causing her to fall backwards onto her head. She did not feel dizzy or experience any heart palpitations prior to the fall. She has a mild abrasion to the back of her head that did not bleed.  She was using her walker at the time.  Denies being on a blood thinner.  Did not lose consciousness. Tetanus is not up to date.    On my initial evaluation, vital signs normal, afebrile, not hypoxic or tachycardic. On physical exam patient is awake, alert, oriented x3, no acute distress, resting comfortably in wheelchair.  She is not uncomfortable, ill or toxic appearing.  She has lesions to her nose, cheeks, right upper forehead and upper lip that are from previous silver nitrate procedure per dermatology and not from fall.  She has a slight hematoma to the back of her occiput with 0.5 superficial abrasion, without active bleeding or laceration.  No tenderness to her cervical or thoracic spine, she does have bony tenderness to her lumbar spine.  Heart sounds are normal, lungs are clear without wheezing or crackles.  No abdominal tenderness on palpation..  Full neurologic exam without focal deficit.  Pupils are equal reactive to light, normal finger-nose-finger, negative Romberg test, no axial loading.  Normal  strength and sensation bilaterally.  Cranial nerves III through XII intact.  Normal Gait    Differential diagnosis includes subdural hematoma, epidural hematoma, subarachnoid hemorrhage, concussion, muscular strain, superficial hematoma, vertebral fracture, vertebral dislocation, lumbar fracture, lumbar dislocation, muscular strain, arrhythmia, orthostatic syncope, ACS. Emergency department workup included basic labs, CT head, cervical and lumbar spine. Patient declined wanting medication for pain.    CT head negative for acute pathology such as subdural dural hematoma, epidural hematoma, subarachnoid hemorrhage.  CT cervical and lumbar spine negative for vertebral fracture or dislocation.  Sounds like patient had a mechanical fall rather than caused by arrhythmia.  No chest pain and EKG reassuring, low suspicion for ACS.  Her vitals have been stable here and she has been slightly hypertensive, low suspicion for orthostatic syncope as cause for symptoms.    Had shared decision-making conversation with patient regarding hospital observation or discharge home.  Patient verbalized she would like to go home, neighbor Amy will check on her tonight and sounds like she has good support at her apartment complex.  Feel this is reasonable as I do not suspect any emergent process that require further ER intervention or hospitalization.  She is otherwise neurologically intact, her vitals and work-up today are reassuring.  Ambulation test prior to discharge was normal without weakness or difficulty walking.  I feel patient can be managed as an outpatient with strict return precautions.  Tetanus was given today.    Patient has had serial examinations and notes significant improvement.     Patient was discharged in stable condition with treatment plan as below. Instructed to follow up with primary care provider in 3 days and return to the emergency department with any new or worsening of symptoms. Patient expressed understanding,  feels comfortable, and is in agreement with this plan. All questions addressed prior to discharge.    Medical Decision Making    History:    Supplemental history from: Documented in chart, if applicable and Friend    External Record(s) reviewed: Documented in chart, if applicable. and Outpatient Record: previous cardiology clinic visits    Work Up:    Chart documentation includes differential considered and any EKGs or imaging independently interpreted by provider, where specified.    In additional to work up documented, I considered the following work up: Documented in chart, if applicable.    External consultation:    Discussion of management with another provider: Documented in chart, if applicable    Complicating factors:    Care impacted by chronic illness: N/A    Care affected by social determinants of health: N/A    Disposition considerations: Discharge. No recommendations on prescription strength medication(s). I considered admission, but discharged the patient after share decision making conversation.    ED COURSE:  4:54 PM  I reviewed the patient's chart. I met with the patient to gather history and to perform my initial exam.    I wore appropriate PPE during this encounter including: facemask & eye protection   5:39 PM I staffed this patient case with Dr. Anders who agrees with plan at this time and will see the patient for their history, exam, and complete evaluation.  8:06 PM  I rechecked the patient and updated her on results. We discussed plan for discharge including treatment plan, follow-up and return precautions to emergency department.  Patient voiced understanding and in agreement with this plan.  8:14 PM per RN, patient ambulated the halls without difficulty.     At the conclusion of the encounter I discussed the results of all of the tests and the disposition. The questions were answered. The patient or family acknowledged understanding and was agreeable with the care plan.     MEDICATIONS  "GIVEN IN THE EMERGENCY:  Medications   Td (tetanus & diphtheria toxoids) -  adult formulation - for ages 7 years and older (0.5 mLs Intramuscular Given 2/15/23 1927)     NEW PRESCRIPTIONS STARTED AT TODAY'S ER VISIT  Discharge Medication List as of 2/15/2023  8:11 PM        =================================================================    HPI:    Patient information was obtained from: patient and friend    Use of Interpretor: N/A     Isamar Little is a 89 year old female with a pertinent history of atrial fibrillation (not on blood thinners), hypertension, SSS, cardiac pacemaker, hyperlipidemia, anxiety who presents to this ED for evaluation of fall    Patient reports about 1 hour prior to evaluation, she was at her neighbor's apartment, when [place] while using her walker and \"lost her balance\" causing her to fall backwards onto her head.  She did not lose consciousness.  Neighbor called EMS and patient was brought to ER for further evaluation.  Patient denies being on a blood thinner.  She has mild head pain to the back of her head where she fell, but no headache, dizziness, speech difficulty, weakness, numbness or tingling in arms or legs or other neurologic symptoms.  She has mild low back pain.  Denies chest pain, shortness of breath, dizziness, vision changes such as blurry or double vision, palpitations, abdominal pain, urinary symptoms, diarrhea, constipation has chronic leg swelling, but denies new leg swelling.    [facility] neighbor across the street, Amy cares for patient frequently.  Patient uses walker to get around.  Patient does not have family in the area.    REVIEW OF SYSTEMS:  Review of Systems   Constitutional: Negative for chills and fever.   Eyes: Negative for photophobia and visual disturbance.   Respiratory: Negative for shortness of breath.    Cardiovascular: Negative for chest pain.   Gastrointestinal: " Negative for abdominal pain, nausea and vomiting.   Genitourinary: Negative for difficulty urinating, dysuria, flank pain, frequency and urgency.   Musculoskeletal: Positive for back pain (low back). Negative for neck pain and neck stiffness.   Skin: Negative for wound.   Neurological: Negative for dizziness, syncope, speech difficulty, weakness, light-headedness, numbness and headaches.   All other systems reviewed and are negative.     PAST MEDICAL HISTORY:  Past Medical History:   Diagnosis Date     Adjustment disorder with mixed anxiety and depressed mood      Anxiety      Anxiety     Previously on sertraline     Anxiety state, unspecified      Arthritis      Atherosclerotic heart disease of native coronary artery without angina pectoris 5/16/2017     Bereavement, uncomplicated      Cardiac pacemaker in situ 5/31/2017    [employer] Jasp[employer] MEEKS DOI: 5/17/17 Dr. David Patel      Chronic diastolic congestive heart failure (H) 6/28/2018     Chronic edema 7/13/2020     Closed fracture of tooth with routine healing 4/23/2019     Coronary artery disease      Coronary atherosclerosis of native coronary artery      Depressive disorder, not elsewhere classified      Edema      Essential hypertension with goal blood pressure less than 140/90      Essential hypertension, benign      Gastritis      Gastroesophageal reflux disease 7/13/2020     Hyperlipidemia      Hyperlipidemia with target LDL less than 70      Hypertension      Osteoporosis without current pathological fracture, unspecified osteoporosis type 7/13/2020     Other and unspecified hyperlipidemia      Other malaise and fatigue      Paroxysmal atrial fibrillation (H)      Primary insomnia 7/13/2020     Primary osteoarthritis 7/13/2020     Skin cancer      Skin cancer      SSS (sick sinus syndrome) (H)      Stented coronary artery     STENT X 2     PAST SURGICAL HISTORY:  Past Surgical History:   Procedure Laterality Date     ANGIOPLASTY  06/2008    stents x3      CAPSULOTOMY Left 1/2014     CARDIAC SURGERY  06/11/2008    angioplasty w/stent     CATARACT EXTRACTION, BILATERAL  1/2010     DILATION AND CURETTAGE      X3     ENT SURGERY      T&A     EYE SURGERY Bilateral     IOL     EYE SURGERY Left     YAG      GYN SURGERY      D&C X 3     IMPLANT PACEMAKER       IR ATHERECTOMY  6/11/2008     MOHS MICROGRAPHIC PROCEDURE Left 8/19/2014    Procedure: MOHS MICROGRAPHIC PROCEDURE;  Surgeon: Hector Manjarrez MD;  Location: [facility]     TONSILLECTOMY & ADENOIDECTOMY       CURRENT MEDICATIONS:    No current facility-administered medications for this encounter.    Current Outpatient Medications:      aspirin (ASA) 81 MG EC tablet, Take 81 mg by mouth daily (Patient not taking: Reported on 2/3/2023), Disp: , Rfl:      escitalopram (LEXAPRO) 10 MG tablet, Take 1 tablet by mouth daily at 2 pm, Disp: , Rfl:      furosemide (LASIX) 20 MG tablet, Take 1 tablet by mouth daily, Disp: , Rfl:      imiquimod (ALDARA) 5 % external cream, Apply topically to affected areas 5 days per week for 6 weeks. Treat nose first., Disp: , Rfl:      melatonin 3 MG tablet, TAKE 1 TABLET BY MOUTH ONCE DAILY AT BEDTIME AS NEEDED., Disp: , Rfl:      nitroGLYcerin (NITROSTAT) 0.4 MG sublingual tablet, Place 0.4 mg under the tongue as needed (Patient not taking: Reported on 2/3/2023), Disp: , Rfl:      pravastatin (PRAVACHOL) 40 MG tablet, Take 1.5 tablets (60 mg) by mouth At Bedtime, Disp: 90 tablet, Rfl: 1    ALLERGIES:  Allergies   Allergen Reactions     Cephalexin Rash     FAMILY HISTORY:  Family History   Problem Relation Age of Onset     Hypertension Mother      Hypertension Father      Cerebrovascular Disease Father      Kidney Disease Maternal Grandmother      Kidney Disease Maternal Grandfather      Early Death Maternal Grandfather      Heart Disease Maternal Grandfather      Kidney Disease Paternal Grandmother      Kidney Disease Paternal Grandfather      Early Death Paternal Grandfather      Heart  "Disease Paternal Grandfather      Coronary Artery Disease Sister      Coronary Artery Disease Brother      Dementia Brother         disabled vet     SOCIAL HISTORY:   Social History     Socioeconomic History     Marital status:    Tobacco Use     Smoking status: Never     Smokeless tobacco: Never   Substance and Sexual Activity     Alcohol use: No     Drug use: No     Sexual activity: Not Currently     Partners: Male     Birth control/protection: Post-menopausal     VITALS:  Patient Vitals for the past 24 hrs:   BP Temp Temp src Pulse Resp SpO2 Height Weight   02/15/23 1930 130/87 -- -- -- -- -- -- --   02/15/23 1928 -- -- -- 91 -- 96 % -- --   02/15/23 1900 (!) 142/91 -- -- 89 -- 96 % -- --   02/15/23 1800 (!) 201/108 -- -- 88 18 96 % 1.499 m (4' 11\") 62.1 kg (137 lb)   02/15/23 1745 -- -- -- 92 (!) 31 95 % -- --   02/15/23 1730 (!) 216/105 -- -- 95 -- 94 % -- --   02/15/23 1637 (!) 148/111 98.8  F (37.1  C) Temporal 97 16 94 % -- 62.1 kg (137 lb)     PHYSICAL EXAM    Constitutional: Well developed, Well nourished, NAD, not uncomfortable, ill or toxic appearing.    HENT: Normocephalic,She has lesions to her nose, cheeks and upper lip that are chronic and not new.  She has a slight hematoma to the back of her occiput with 0.5 cm abrasion, no laceration without active bleeding , Bilateral external ears normal, Oropharynx normal, mucous membranes moist, Nose normal.   Neck: Normal range of motion, No tenderness, Supple, No stridor.  Eyes: PERRL, EOMI, Conjunctiva normal, No discharge.   Respiratory: Normal breath sounds, No respiratory distress, No wheezing, Speaks full sentences easily. No cough.  Cardiovascular: Normal heart rate, Regular rhythm, No murmurs, No rubs, No gallops. Chest wall nontender.  GI: Soft, No tenderness, No masses, No flank tenderness. No rebound or guarding.  Musculoskeletal: 2+ DP pulses. No edema. No cyanosis, No clubbing. Good range of motion in all major joints. No tenderness to " palpation or major deformities noted. No tenderness to her cervical or thoracic spine, she does have bony tenderness to her lumbar spine. .   Integument: Warm, Dry, No erythema, No rash. No petechiae.  Neurologic: Alert & oriented x 3, Normal motor function, Normal sensory function,  Full neurologic exam without focal deficit.  Pupils are equal reactive to light, normal finger-nose-finger, negative Romberg test, no axial loading.  Normal strength and sensation bilaterally.  Cranial nerves III through XII intact.  Gait normal.     Psychiatric: Affect normal, Judgment normal, Mood normal. Cooperative.    LAB:  All pertinent labs reviewed and interpreted.  Labs Ordered and Resulted from Time of ED Arrival to Time of ED Departure   BASIC METABOLIC PANEL - Abnormal       Result Value    Sodium 139      Potassium 3.7      Chloride 104      Carbon Dioxide (CO2) 23      Anion Gap 12      Urea Nitrogen 14.6      Creatinine 0.62      Calcium 9.0      Glucose 115 (*)     GFR Estimate 82     CBC WITH PLATELETS - Normal    WBC Count 8.2      RBC Count 4.82      Hemoglobin 15.2      Hematocrit 45.7      MCV 95      MCH 31.5      MCHC 33.3      RDW 13.6      Platelet Count 321     MAGNESIUM - Normal    Magnesium 2.3         RADIOLOGY:  Reviewed all pertinent imaging. Please see official radiology report.  Lumbar spine CT w/o contrast   Final Result   IMPRESSION:   1.  Stable chronic low-grade compression superior endplate L4 from prior plain film imaging 05/03/2016. Stable mild retropulsion. Stable low-grade degenerative subluxation L3 upon L4. No high-grade compressions or high-grade subluxation.      2.  No acute lumbar fracture or posttraumatic subluxation.      3.  No high-grade spinal canal or neural foraminal stenosis. Multilevel mild to moderate spinal stenosis and/or foraminal compromise most marked at L3-L4 level.      4.  Demineralization and advanced degenerative changes throughout the lumbar spine including interspace  narrowing throughout the visualized lower thoracic and lumbar levels.      5.  Cardiac pacer is present.      Cervical spine CT w/o contrast   Final Result   IMPRESSION:   1.  No fracture or posttraumatic subluxation.   2.  Multilevel cervical spondylosis      Head CT w/o contrast   Final Result   IMPRESSION:   1.  No CT evidence for acute intracranial process.   2.  Brain atrophy and presumed chronic microvascular ischemic changes as above.          EKG:    Performed at: 1739  Rate: 90 bpm   Impression: sinus rhythm with t wave inversions in V2, V3, V4, lead III    I have reviewed and interpreted the EKG(s) documented above along with Dr. Anders.    PROCEDURES:   None     Diagnosis:  1. Fall, initial encounter    2. Head injury, initial encounter        Yesenia Ulrich PA-C  Emergency Medicine  [facility]  2/15/2023       Yesenia Ulrich PA-C  02/15/23 9500

## 2023-02-16 ENCOUNTER — DOCUMENTATION ONLY (OUTPATIENT)
Dept: OTHER | Facility: CLINIC | Age: 88
End: 2023-02-16
Payer: MEDICARE

## 2023-02-16 NOTE — ED NOTES
Pt walked down the hallway, tolerated well with 1 assist. Pt states she uses a walker at home and is able to get around well. Pt standing near chair and steady on feet.

## 2023-02-16 NOTE — DISCHARGE INSTRUCTIONS
Please bring this paperwork with you to your follow-up appointment.    You were seen in the urgent care/emergency department for head injury after a fall.     You had a CT scan done of your head, which did not show any brain bleed.  You also had a CT scan of your neck and your low back, these also looked good without any broken bones.    Please be sure to take care when walking over the next few days to help prevent additional falls and head injury.    For your symptoms:  Place an ice pack to the back of your head to help with the swelling and pain    Tylenol/ibuprofen as needed  You may take up to 650 mg of Tylenol (acetaminophen) up to 4 times daily and up to 600 mg of ibuprofen up to 4 times daily as needed for fever, pain.  Please do not take more than the daily maximum recommended dose (tylenol = 4 grams, ibuprofen = 2.4 grams) as it can cause harm to your liver, kidneys, stomach.  It is best to take ibuprofen with food. Please read labels of any over-the-counter medicine you may be taking as it may contain Tylenol (acetaminophen) or Advil (ibuprofen).     Follow up with your primary care provider for recheck in 3 days for ER follow-up    Return to the emergency department if you develop worsening headache, dizziness, vomiting, fevers, numbness or tingling in arms or legs or any difficulty walking, or any other new worsening or concerning symptoms. We'd be happy to see you again.    Thank you for allowing us to be part of your care today.    Take care!  -Yesenia Ulrich PA-C

## 2023-02-18 ENCOUNTER — APPOINTMENT (OUTPATIENT)
Dept: ULTRASOUND IMAGING | Facility: CLINIC | Age: 88
End: 2023-02-18
Attending: EMERGENCY MEDICINE
Payer: MEDICARE

## 2023-02-18 ENCOUNTER — APPOINTMENT (OUTPATIENT)
Dept: CT IMAGING | Facility: CLINIC | Age: 88
End: 2023-02-18
Attending: EMERGENCY MEDICINE
Payer: MEDICARE

## 2023-02-18 ENCOUNTER — HOSPITAL ENCOUNTER (OUTPATIENT)
Facility: CLINIC | Age: 88
Setting detail: OBSERVATION
Discharge: NURSING, REHAB OR LONG-TERM CARE | End: 2023-02-21
Attending: EMERGENCY MEDICINE | Admitting: EMERGENCY MEDICINE
Payer: MEDICARE

## 2023-02-18 DIAGNOSIS — R54 ADVANCED AGE: Primary | ICD-10-CM

## 2023-02-18 DIAGNOSIS — R29.6 FALLS FREQUENTLY: ICD-10-CM

## 2023-02-18 LAB
ALBUMIN SERPL-MCNC: 3.5 G/DL (ref 3.5–5)
ALBUMIN UR-MCNC: NEGATIVE MG/DL
ALP SERPL-CCNC: 86 U/L (ref 45–120)
ALT SERPL W P-5'-P-CCNC: 20 U/L (ref 0–45)
ANION GAP SERPL CALCULATED.3IONS-SCNC: 13 MMOL/L (ref 5–18)
APPEARANCE UR: CLEAR
AST SERPL W P-5'-P-CCNC: 31 U/L (ref 0–40)
ATRIAL RATE - MUSE: 75 BPM
BASOPHILS # BLD AUTO: 0 10E3/UL (ref 0–0.2)
BASOPHILS NFR BLD AUTO: 0 %
BILIRUB SERPL-MCNC: 0.8 MG/DL (ref 0–1)
BILIRUB UR QL STRIP: NEGATIVE
BUN SERPL-MCNC: 12 MG/DL (ref 8–28)
CALCIUM SERPL-MCNC: 8.7 MG/DL (ref 8.5–10.5)
CHLORIDE BLD-SCNC: 108 MMOL/L (ref 98–107)
CK SERPL-CCNC: 105 U/L (ref 30–190)
CO2 SERPL-SCNC: 21 MMOL/L (ref 22–31)
COLOR UR AUTO: ABNORMAL
CREAT SERPL-MCNC: 0.62 MG/DL (ref 0.6–1.1)
DIASTOLIC BLOOD PRESSURE - MUSE: 78 MMHG
EOSINOPHIL # BLD AUTO: 0 10E3/UL (ref 0–0.7)
EOSINOPHIL NFR BLD AUTO: 0 %
ERYTHROCYTE [DISTWIDTH] IN BLOOD BY AUTOMATED COUNT: 13.6 % (ref 10–15)
FLUAV RNA SPEC QL NAA+PROBE: NEGATIVE
FLUBV RNA RESP QL NAA+PROBE: NEGATIVE
GFR SERPL CREATININE-BSD FRML MDRD: 82 ML/MIN/1.73M2
GLUCOSE BLD-MCNC: 102 MG/DL (ref 70–125)
GLUCOSE UR STRIP-MCNC: NEGATIVE MG/DL
HCT VFR BLD AUTO: 41 % (ref 35–47)
HGB BLD-MCNC: 13.8 G/DL (ref 11.7–15.7)
HGB UR QL STRIP: NEGATIVE
IMM GRANULOCYTES # BLD: 0 10E3/UL
IMM GRANULOCYTES NFR BLD: 0 %
INTERPRETATION ECG - MUSE: NORMAL
KETONES UR STRIP-MCNC: 10 MG/DL
LACTATE SERPL-SCNC: 1.3 MMOL/L (ref 0.7–2)
LEUKOCYTE ESTERASE UR QL STRIP: NEGATIVE
LYMPHOCYTES # BLD AUTO: 1.5 10E3/UL (ref 0.8–5.3)
LYMPHOCYTES NFR BLD AUTO: 21 %
MAGNESIUM SERPL-MCNC: 2 MG/DL (ref 1.8–2.6)
MCH RBC QN AUTO: 31.7 PG (ref 26.5–33)
MCHC RBC AUTO-ENTMCNC: 33.7 G/DL (ref 31.5–36.5)
MCV RBC AUTO: 94 FL (ref 78–100)
MONOCYTES # BLD AUTO: 0.7 10E3/UL (ref 0–1.3)
MONOCYTES NFR BLD AUTO: 10 %
MUCOUS THREADS #/AREA URNS LPF: PRESENT /LPF
NEUTROPHILS # BLD AUTO: 5 10E3/UL (ref 1.6–8.3)
NEUTROPHILS NFR BLD AUTO: 69 %
NITRATE UR QL: NEGATIVE
NRBC # BLD AUTO: 0 10E3/UL
NRBC BLD AUTO-RTO: 0 /100
P AXIS - MUSE: 71 DEGREES
PH UR STRIP: 7.5 [PH] (ref 5–7)
PLATELET # BLD AUTO: 323 10E3/UL (ref 150–450)
POTASSIUM BLD-SCNC: 3.6 MMOL/L (ref 3.5–5)
PR INTERVAL - MUSE: 154 MS
PROT SERPL-MCNC: 6.6 G/DL (ref 6–8)
QRS DURATION - MUSE: 118 MS
QT - MUSE: 436 MS
QTC - MUSE: 486 MS
R AXIS - MUSE: -21 DEGREES
RBC # BLD AUTO: 4.35 10E6/UL (ref 3.8–5.2)
RBC URINE: <1 /HPF
RSV RNA SPEC NAA+PROBE: NEGATIVE
SARS-COV-2 RNA RESP QL NAA+PROBE: NEGATIVE
SODIUM SERPL-SCNC: 142 MMOL/L (ref 136–145)
SP GR UR STRIP: 1.01 (ref 1–1.03)
SYSTOLIC BLOOD PRESSURE - MUSE: 159 MMHG
T AXIS - MUSE: 1 DEGREES
TROPONIN I SERPL-MCNC: 0.02 NG/ML (ref 0–0.29)
TSH SERPL DL<=0.005 MIU/L-ACNC: 1.45 UIU/ML (ref 0.3–5)
UROBILINOGEN UR STRIP-MCNC: <2 MG/DL
VENTRICULAR RATE- MUSE: 75 BPM
WBC # BLD AUTO: 7.2 10E3/UL (ref 4–11)
WBC URINE: 1 /HPF

## 2023-02-18 PROCEDURE — 83605 ASSAY OF LACTIC ACID: CPT | Performed by: EMERGENCY MEDICINE

## 2023-02-18 PROCEDURE — 36415 COLL VENOUS BLD VENIPUNCTURE: CPT | Performed by: EMERGENCY MEDICINE

## 2023-02-18 PROCEDURE — 81001 URINALYSIS AUTO W/SCOPE: CPT | Performed by: EMERGENCY MEDICINE

## 2023-02-18 PROCEDURE — 82607 VITAMIN B-12: CPT | Performed by: HOSPITALIST

## 2023-02-18 PROCEDURE — 84443 ASSAY THYROID STIM HORMONE: CPT | Performed by: HOSPITALIST

## 2023-02-18 PROCEDURE — 70450 CT HEAD/BRAIN W/O DYE: CPT | Mod: MG

## 2023-02-18 PROCEDURE — 258N000003 HC RX IP 258 OP 636: Performed by: EMERGENCY MEDICINE

## 2023-02-18 PROCEDURE — 250N000013 HC RX MED GY IP 250 OP 250 PS 637: Performed by: EMERGENCY MEDICINE

## 2023-02-18 PROCEDURE — 99221 1ST HOSP IP/OBS SF/LOW 40: CPT | Performed by: HOSPITALIST

## 2023-02-18 PROCEDURE — 83735 ASSAY OF MAGNESIUM: CPT | Performed by: EMERGENCY MEDICINE

## 2023-02-18 PROCEDURE — G0378 HOSPITAL OBSERVATION PER HR: HCPCS

## 2023-02-18 PROCEDURE — 85025 COMPLETE CBC W/AUTO DIFF WBC: CPT | Performed by: EMERGENCY MEDICINE

## 2023-02-18 PROCEDURE — 82550 ASSAY OF CK (CPK): CPT | Performed by: HOSPITALIST

## 2023-02-18 PROCEDURE — 93005 ELECTROCARDIOGRAM TRACING: CPT | Performed by: EMERGENCY MEDICINE

## 2023-02-18 PROCEDURE — 93971 EXTREMITY STUDY: CPT | Mod: RT

## 2023-02-18 PROCEDURE — 80053 COMPREHEN METABOLIC PANEL: CPT | Performed by: EMERGENCY MEDICINE

## 2023-02-18 PROCEDURE — C9803 HOPD COVID-19 SPEC COLLECT: HCPCS

## 2023-02-18 PROCEDURE — 87637 SARSCOV2&INF A&B&RSV AMP PRB: CPT | Performed by: EMERGENCY MEDICINE

## 2023-02-18 PROCEDURE — 99285 EMERGENCY DEPT VISIT HI MDM: CPT | Mod: 25

## 2023-02-18 PROCEDURE — 250N000013 HC RX MED GY IP 250 OP 250 PS 637: Performed by: HOSPITALIST

## 2023-02-18 PROCEDURE — 84484 ASSAY OF TROPONIN QUANT: CPT | Performed by: EMERGENCY MEDICINE

## 2023-02-18 RX ORDER — ONDANSETRON 2 MG/ML
4 INJECTION INTRAMUSCULAR; INTRAVENOUS EVERY 6 HOURS PRN
Status: DISCONTINUED | OUTPATIENT
Start: 2023-02-18 | End: 2023-02-21 | Stop reason: HOSPADM

## 2023-02-18 RX ORDER — ESCITALOPRAM OXALATE 10 MG/1
10 TABLET ORAL DAILY
Status: DISCONTINUED | OUTPATIENT
Start: 2023-02-19 | End: 2023-02-21 | Stop reason: HOSPADM

## 2023-02-18 RX ORDER — FUROSEMIDE 20 MG/1
10 TABLET ORAL DAILY
Status: DISCONTINUED | OUTPATIENT
Start: 2023-02-19 | End: 2023-02-21 | Stop reason: HOSPADM

## 2023-02-18 RX ORDER — PRAVASTATIN SODIUM 20 MG
60 TABLET ORAL AT BEDTIME
Status: DISCONTINUED | OUTPATIENT
Start: 2023-02-18 | End: 2023-02-21 | Stop reason: HOSPADM

## 2023-02-18 RX ORDER — NITROGLYCERIN 0.4 MG/1
0.4 TABLET SUBLINGUAL EVERY 5 MIN PRN
Status: DISCONTINUED | OUTPATIENT
Start: 2023-02-18 | End: 2023-02-21 | Stop reason: HOSPADM

## 2023-02-18 RX ORDER — DOCUSATE SODIUM 100 MG/1
100 CAPSULE, LIQUID FILLED ORAL 2 TIMES DAILY PRN
Status: DISCONTINUED | OUTPATIENT
Start: 2023-02-18 | End: 2023-02-21 | Stop reason: HOSPADM

## 2023-02-18 RX ORDER — ACETAMINOPHEN 325 MG/1
650 TABLET ORAL EVERY 6 HOURS PRN
Status: DISCONTINUED | OUTPATIENT
Start: 2023-02-18 | End: 2023-02-21 | Stop reason: HOSPADM

## 2023-02-18 RX ORDER — ACETAMINOPHEN 650 MG/1
650 SUPPOSITORY RECTAL EVERY 6 HOURS PRN
Status: DISCONTINUED | OUTPATIENT
Start: 2023-02-18 | End: 2023-02-21 | Stop reason: HOSPADM

## 2023-02-18 RX ORDER — ASPIRIN 81 MG/1
81 TABLET ORAL EVERY EVENING
Status: DISCONTINUED | OUTPATIENT
Start: 2023-02-18 | End: 2023-02-21 | Stop reason: HOSPADM

## 2023-02-18 RX ORDER — ONDANSETRON 4 MG/1
4 TABLET, ORALLY DISINTEGRATING ORAL EVERY 6 HOURS PRN
Status: DISCONTINUED | OUTPATIENT
Start: 2023-02-18 | End: 2023-02-21 | Stop reason: HOSPADM

## 2023-02-18 RX ADMIN — Medication 3 MG: at 21:52

## 2023-02-18 RX ADMIN — PRAVASTATIN SODIUM 60 MG: 20 TABLET ORAL at 23:53

## 2023-02-18 RX ADMIN — ASPIRIN 81 MG: 81 TABLET, COATED ORAL at 23:53

## 2023-02-18 RX ADMIN — SODIUM CHLORIDE 500 ML: 9 INJECTION, SOLUTION INTRAVENOUS at 17:44

## 2023-02-18 ASSESSMENT — ACTIVITIES OF DAILY LIVING (ADL)
ADLS_ACUITY_SCORE: 41
DEPENDENT_IADLS:: CLEANING;COOKING;LAUNDRY;SHOPPING;MEAL PREPARATION;TRANSPORTATION;MEDICATION MANAGEMENT
ADLS_ACUITY_SCORE: 37
ADLS_ACUITY_SCORE: 37
ADLS_ACUITY_SCORE: 41

## 2023-02-18 ASSESSMENT — ENCOUNTER SYMPTOMS
DIZZINESS: 0
WEAKNESS: 1
COUGH: 0
ABDOMINAL PAIN: 0
LIGHT-HEADEDNESS: 0
FEVER: 0

## 2023-02-18 NOTE — ED TRIAGE NOTES
Pt here from home via EMS for increased weakness and R leg swelling since last Wednesday. Pt reports she is supposed to wrap bilateral legs but has been unable to, R leg more swollen than L leg. Pt reports she takes diuretics and that she fell and was seen last Wednesday for that fall

## 2023-02-18 NOTE — ED PROVIDER NOTES
EMERGENCY DEPARTMENT ENCOUNTER      NAME: Isamar Little  AGE: 89 year old female  YOB: 1928  MRN: [id number]  EVALUATION DATE & TIME: 2/18/2023  3:38 PM    PCP: Mikal Perez    ED PROVIDER: Darin Gleason MD    Chief Complaint   Patient presents with     Generalized Weakness     Leg Swelling     FINAL IMPRESSION:  1. Falls frequently      ED COURSE & MEDICAL DECISION MAKING:    Pertinent Labs & Imaging studies reviewed. (See chart for details)  89 year old female presents to the Emergency Department for evaluation of generalized weakness and repetitive falls.  Evaluated in this emergency department 3 days prior for fall.  Had a head injury.  Negative head CT scan imaging.  Discharged back to assisted living facility.  Patient presenting with her friend to the emergency department tonight due to generalized weakness and inability getting out of the bed.  Patient has a history of increasing unsteadiness and falls.  Typically gets around with a walker but is unable to do so without assistance currently.  Noted to have asymmetrical swelling of her lower extremity right greater than left.  Ultrasound of the right lower leg was negative for acute DVT.  Urinalysis negative for acute infection.  Laboratory testing otherwise largely unremarkable with no significant acute findings identified.  I did opt to repeat her CT scan head to evaluate for possible delayed bleed and subdural.  This was negative as well.  Nonfocal neurological examination.  Patient has a pacemaker.  ECG similar to previous.  We attempted to ambulate the patient in the emergency department and she was unable to do so without required assistance.  Patient appeared to be significant falls risk.  No other acute findings identified on work-up or examination here in the emergency department.  Subsequently reached out to our care management team to see about the possibility of placement as patient will require I think an escalated level  of care to support her current falls and unsteadiness.  That process has been initiated there are no TCU or nursing home beds available this evening.  I reached out and discussed the case with patient's niece who resides in [place].  This is the only other family member that is assisting with patient's care.  Updated her on all of the test results and plan of care.  Given the patient is not safe for discharge due to her falls risk and repetitive falls over the past several days plan of care is for admission to the hospital for further care management PT OT and likely placement in a facility.    4:12 PM I introduced myself to the patient, obtained patient history, performed a physical exam, and discussed plan for ED workup including potential diagnostic laboratory/imaging studies and interventions.      At the conclusion of the encounter I discussed the results of all of the tests and the disposition. The questions were answered. The patient or family acknowledged understanding and was agreeable with the care plan.     Medical Decision Making    History:    Supplemental history from: Documented in chart, if applicable and Family Member/Significant Other    External Record(s) reviewed: Documented in chart, if applicable.    Work Up:    Chart documentation includes differential considered and any EKGs or imaging independently interpreted by provider, where specified.    In additional to work up documented, I considered the following work up: Documented in chart, if applicable.    External consultation:    Discussion of management with another provider: Documented in chart, if applicable    Complicating factors:    Care impacted by chronic illness: Heart Disease, Hyperlipidemia and Hypertension    Care affected by social determinants of health: N/A    Disposition considerations: Admit.          MEDICATIONS GIVEN IN THE EMERGENCY:  Medications   aspirin EC tablet 81 mg (has no administration in time range)    escitalopram (LEXAPRO) tablet 10 mg (has no administration in time range)   furosemide (LASIX) half-tab 10 mg (has no administration in time range)   nitroGLYcerin (NITROSTAT) sublingual tablet 0.4 mg (has no administration in time range)   pravastatin (PRAVACHOL) tablet 60 mg (has no administration in time range)   ondansetron (ZOFRAN ODT) ODT tab 4 mg (has no administration in time range)     Or   ondansetron (ZOFRAN) injection 4 mg (has no administration in time range)   acetaminophen (TYLENOL) tablet 650 mg (has no administration in time range)     Or   acetaminophen (TYLENOL) Suppository 650 mg (has no administration in time range)   docusate sodium (COLACE) capsule 100 mg (has no administration in time range)   0.9% sodium chloride BOLUS (0 mLs Intravenous Stopped 2/18/23 1804)   melatonin tablet 3 mg (3 mg Oral Given 2/18/23 2152)       NEW PRESCRIPTIONS STARTED AT TODAY'S ER VISIT  New Prescriptions    No medications on file          =================================================================    HPI    Patient information was obtained from: Patient     Use of : N/A         Isamar Little is a 89 year old female with a pertinent history of CAD, stent X 2, pacemaker,  HTN, hyperlipidemia,  who presents to this ED by EMS for evaluation of generalized weakness and leg swelling.     Per chart review, patient was seen in ER for evaluation of a fall on 2/15/2023. She has a moderate-sized posterior scalp hematoma but no open laceration.  CT imaging of the head, neck, and low back were obtained showing no signs of acute injuries    Patient has been having increased weakness and right leg swelling since last Wednesday. She says she had a episode where she was sitting and she couldn't get up because her body was so weak. She also notes bilateral leg swelling but her right leg is greater than the left. Patient has a bruise on her right elbow from her fall on the 15th. Patient denies being on blood  thinners. Patient denies chest pain, lightheadedness, dizziness, fever, cough, and abdominal pain.       REVIEW OF SYSTEMS   Review of Systems   Constitutional: Negative for fever.   Respiratory: Negative for cough.    Cardiovascular: Positive for leg swelling (R>L). Negative for chest pain.   Gastrointestinal: Negative for abdominal pain.   Skin:        Positive for bruise to right elbow   Neurological: Positive for weakness. Negative for dizziness and light-headedness.   All other systems reviewed and are negative.     PAST MEDICAL HISTORY:  Past Medical History:   Diagnosis Date     Adjustment disorder with mixed anxiety and depressed mood      Anxiety      Anxiety     Previously on sertraline     Anxiety state, unspecified      Arthritis      Atherosclerotic heart disease of native coronary artery without angina pectoris 5/16/2017     Bereavement, uncomplicated      Cardiac pacemaker in situ 5/31/2017    [employer] Jaspal MEEKS DOI: 5/17/17 Dr. David Patel      Chronic diastolic congestive heart failure (H) 6/28/2018     Chronic edema 7/13/2020     Closed fracture of tooth with routine healing 4/23/2019     Coronary artery disease      Coronary atherosclerosis of native coronary artery      Depressive disorder, not elsewhere classified      Edema      Essential hypertension with goal blood pressure less than 140/90      Essential hypertension, benign      Gastritis      Gastroesophageal reflux disease 7/13/2020     Hyperlipidemia      Hyperlipidemia with target LDL less than 70      Hypertension      Osteoporosis without current pathological fracture, unspecified osteoporosis type 7/13/2020     Other and unspecified hyperlipidemia      Other malaise and fatigue      Paroxysmal atrial fibrillation (H)      Primary insomnia 7/13/2020     Primary osteoarthritis 7/13/2020     Skin cancer      Skin cancer      SSS (sick sinus syndrome) (H)      Stented coronary artery     STENT X 2       PAST SURGICAL  HISTORY:  Past Surgical History:   Procedure Laterality Date     ANGIOPLASTY  06/2008    stents x3     CAPSULOTOMY Left 1/2014     CARDIAC SURGERY  06/11/2008    angioplasty w/stent     CATARACT EXTRACTION, BILATERAL  1/2010     DILATION AND CURETTAGE      X3     ENT SURGERY      T&A     EYE SURGERY Bilateral     IOL     EYE SURGERY Left     YAG      GYN SURGERY      D&C X 3     IMPLANT PACEMAKER       IR ATHERECTOMY  6/11/2008     MOHS MICROGRAPHIC PROCEDURE Left 8/19/2014    Procedure: MOHS MICROGRAPHIC PROCEDURE;  Surgeon: Hector Manjarrez MD;  Location: [facility]     TONSILLECTOMY & ADENOIDECTOMY             CURRENT MEDICATIONS:    aspirin (ASA) 81 MG EC tablet  escitalopram (LEXAPRO) 10 MG tablet  furosemide (LASIX) 20 MG tablet  melatonin 3 MG tablet  nitroGLYcerin (NITROSTAT) 0.4 MG sublingual tablet  pravastatin (PRAVACHOL) 40 MG tablet        ALLERGIES:  Allergies   Allergen Reactions     Cephalexin Rash       FAMILY HISTORY:  Family History   Problem Relation Age of Onset     Hypertension Mother      Hypertension Father      Cerebrovascular Disease Father      Kidney Disease Maternal Grandmother      Kidney Disease Maternal Grandfather      Early Death Maternal Grandfather      Heart Disease Maternal Grandfather      Kidney Disease Paternal Grandmother      Kidney Disease Paternal Grandfather      Early Death Paternal Grandfather      Heart Disease Paternal Grandfather      Coronary Artery Disease Sister      Coronary Artery Disease Brother      Dementia Brother         disabled vet       SOCIAL HISTORY:   Social History     Socioeconomic History     Marital status:    Tobacco Use     Smoking status: Never     Smokeless tobacco: Never   Substance and Sexual Activity     Alcohol use: No     Drug use: No     Sexual activity: Not Currently     Partners: Male     Birth control/protection: Post-menopausal       VITALS:  BP (!) 172/71   Pulse 73   Temp 98.3  F (36.8  C) (Oral)   Resp 19   SpO2 96%      PHYSICAL EXAM    PHYSICAL EXAM    Constitutional: Well developed, Well nourished, patient is tearful anxious appearing.  HENT: Normocephalic, Atraumatic, Bilateral external ears normal, Oropharynx normal, mucous membranes moist, patient has a chronic skin rash to the anterior face that she reports is related to her chronic dermatological condition.  Eyes: PERRL, EOMI, Conjunctiva normal, No discharge.   Respiratory: Normal breath sounds, No respiratory distress, No wheezing, Speaks full sentences easily. No cough.   Cardiovascular: Normal heart rate, Regular rhythm, No murmurs Chest wall nontender.    GI:  Soft, No tenderness, No masses, No flank tenderness. No rebound or guarding.  : No cva tenderness    Musculoskeletal: Asymmetrical lower extremity swelling with the right enlarged compared to the left  Integument: Warm, Dry, No erythema, No rash. No petechiae.   Neurologic: Alert & oriented x 3, Normal motor function, Normal sensory function, No focal deficits noted.   Psychiatric: Anxious and tearful    LAB:  All pertinent labs reviewed and interpreted.  Results for orders placed or performed during the hospital encounter of 02/18/23   US Lower Extremity Venous Duplex Right    Impression    IMPRESSION:  1.  No deep venous thrombosis in the right lower extremity.   Head CT w/o contrast    Impression    IMPRESSION:  1.  No acute intracranial abnormality.   Comprehensive metabolic panel   Result Value Ref Range    Sodium 142 136 - 145 mmol/L    Potassium 3.6 3.5 - 5.0 mmol/L    Chloride 108 (H) 98 - 107 mmol/L    Carbon Dioxide (CO2) 21 (L) 22 - 31 mmol/L    Anion Gap 13 5 - 18 mmol/L    Urea Nitrogen 12 8 - 28 mg/dL    Creatinine 0.62 0.60 - 1.10 mg/dL    Calcium 8.7 8.5 - 10.5 mg/dL    Glucose 102 70 - 125 mg/dL    Alkaline Phosphatase 86 45 - 120 U/L    AST 31 0 - 40 U/L    ALT 20 0 - 45 U/L    Protein Total 6.6 6.0 - 8.0 g/dL    Albumin 3.5 3.5 - 5.0 g/dL    Bilirubin Total 0.8 0.0 - 1.0 mg/dL    GFR  Estimate 82 >60 mL/min/1.73m2   Lactic acid whole blood   Result Value Ref Range    Lactic Acid 1.3 0.7 - 2.0 mmol/L   Result Value Ref Range    Troponin I 0.02 0.00 - 0.29 ng/mL   Result Value Ref Range    Magnesium 2.0 1.8 - 2.6 mg/dL   UA with Microscopic reflex to Culture    Specimen: Urine, Clean Catch   Result Value Ref Range    Color Urine Light Yellow Colorless, Straw, Light Yellow, Yellow    Appearance Urine Clear Clear    Glucose Urine Negative Negative mg/dL    Bilirubin Urine Negative Negative    Ketones Urine 10 (A) Negative mg/dL    Specific Gravity Urine 1.011 1.001 - 1.030    Blood Urine Negative Negative    pH Urine 7.5 (H) 5.0 - 7.0    Protein Albumin Urine Negative Negative mg/dL    Urobilinogen Urine <2.0 <2.0 mg/dL    Nitrite Urine Negative Negative    Leukocyte Esterase Urine Negative Negative    Mucus Urine Present (A) None Seen /LPF    RBC Urine <1 <=2 /HPF    WBC Urine 1 <=5 /HPF   Asymptomatic Influenza A/B & SARS-CoV2 (COVID-19) Virus PCR Multiplex Nasopharyngeal    Specimen: Nasopharyngeal; Swab   Result Value Ref Range    Influenza A PCR Negative Negative    Influenza B PCR Negative Negative    RSV PCR Negative Negative    SARS CoV2 PCR Negative Negative   CBC with platelets and differential   Result Value Ref Range    WBC Count 7.2 4.0 - 11.0 10e3/uL    RBC Count 4.35 3.80 - 5.20 10e6/uL    Hemoglobin 13.8 11.7 - 15.7 g/dL    Hematocrit 41.0 35.0 - 47.0 %    MCV 94 78 - 100 fL    MCH 31.7 26.5 - 33.0 pg    MCHC 33.7 31.5 - 36.5 g/dL    RDW 13.6 10.0 - 15.0 %    Platelet Count 323 150 - 450 10e3/uL    % Neutrophils 69 %    % Lymphocytes 21 %    % Monocytes 10 %    % Eosinophils 0 %    % Basophils 0 %    % Immature Granulocytes 0 %    NRBCs per 100 WBC 0 <1 /100    Absolute Neutrophils 5.0 1.6 - 8.3 10e3/uL    Absolute Lymphocytes 1.5 0.8 - 5.3 10e3/uL    Absolute Monocytes 0.7 0.0 - 1.3 10e3/uL    Absolute Eosinophils 0.0 0.0 - 0.7 10e3/uL    Absolute Basophils 0.0 0.0 - 0.2 10e3/uL     Absolute Immature Granulocytes 0.0 <=0.4 10e3/uL    Absolute NRBCs 0.0 10e3/uL   Result Value Ref Range     30 - 190 U/L   ECG 12-LEAD WITH MUSE (LHE)   Result Value Ref Range    Systolic Blood Pressure 159 mmHg    Diastolic Blood Pressure 78 mmHg    Ventricular Rate 75 BPM    Atrial Rate 75 BPM    MT Interval 154 ms    QRS Duration 118 ms     ms    QTc 486 ms    P Axis 71 degrees    R AXIS -21 degrees    T Axis 1 degrees    Interpretation ECG       Sinus rhythm  Right bundle branch block  Inferior infarct , age undetermined  Abnormal ECG  When compared with ECG of 15-FEB-2023 17:39,  Criteria for Anterior infarct are no longer Present  T wave inversion no longer evident in Anterior leads  Confirmed by SEE ED PROVIDER NOTE FOR, ECG INTERPRETATION ([id number]),  LANDON ENAMORADO ([id number]) on 2/18/2023 7:09:11 PM         RADIOLOGY:  Reviewed all pertinent imaging. Please see official radiology report.  Head CT w/o contrast   Final Result   IMPRESSION:   1.  No acute intracranial abnormality.      US Lower Extremity Venous Duplex Right   Final Result   IMPRESSION:   1.  No deep venous thrombosis in the right lower extremity.          EKG:    Performed at: 18-Feb-2023 15:43    Impression: Sinus rhythm. Right bundle branch block/. Inferior infarct, age undetermined.    Rate: 75  Rhythm: Sinus  Axis: -21  MT Interval: 154  QRS Interval: 118  QTc Interval: 496  Comparison: When compared with ECG of 15-Feb-2023, Criteria for anterior infarct are no longer present. T wave inversion no longer evident in anterior leads.     I have independently reviewed and interpreted the EKG(s) documented above.    I, Torey Brantley, am serving as a scribe to document services personally performed by Dr. Darin Gleason based on my observation and the provider's statements to me. I, Dr. Darin Gleason, attest that Torye Brantley is acting in a scribe capacity, has observed my performance of the services and has documented them in  accordance with my direction.    Darin Gleason MD  [facility] EMERGENCY ROOM  [address] [address] [phone]     Darin Gleason MD  02/18/23 4412

## 2023-02-18 NOTE — PHARMACY-ADMISSION MEDICATION HISTORY
Pharmacy Note - Admission Medication History    Pertinent Provider Information:     Patient mentions she takes 1/2 tab of furosemide.     Patient thinks she took her medications this morning but isn't sure.    She mentions that she takes Ensure     ______________________________________________________________________    Prior To Admission (PTA) med list completed and updated in EMR.       PTA Med List   Medication Sig Last Dose     aspirin (ASA) 81 MG EC tablet Take 81 mg by mouth every evening 2/17/2023     escitalopram (LEXAPRO) 10 MG tablet Take 10 mg by mouth daily 2/18/2023 at AM     furosemide (LASIX) 20 MG tablet Take 10 mg by mouth daily 2/18/2023 at AM     melatonin 3 MG tablet Take 3 mg by mouth nightly as needed Past Month     nitroGLYcerin (NITROSTAT) 0.4 MG sublingual tablet Place 0.4 mg under the tongue every 5 minutes as needed      pravastatin (PRAVACHOL) 40 MG tablet Take 1.5 tablets (60 mg) by mouth At Bedtime 2/17/2023       Information source(s): Patient and CareEverywhere/[facility]  Method of interview communication: in-person    Summary of Changes to PTA Med List  New: none  Discontinued: Imiquimod  Changed: mpme    Patient was asked about OTC/herbal products specifically.  PTA med list reflects this.    In the past week, patient estimated taking medication this percent of the time:  greater than 90%.    Medication Affordability:Not assessed       Allergies were reviewed, assessed, and updated with the patient.      Patient does not use any multi-dose medications prior to admission.    The information provided in this note is only as accurate as the sources available at the time of the update(s).    Thank you for the opportunity to participate in the care of this patient.    Liliana Dawson RPH  2/18/2023 5:29 PM

## 2023-02-19 ENCOUNTER — APPOINTMENT (OUTPATIENT)
Dept: OCCUPATIONAL THERAPY | Facility: CLINIC | Age: 88
End: 2023-02-19
Attending: HOSPITALIST
Payer: MEDICARE

## 2023-02-19 ENCOUNTER — APPOINTMENT (OUTPATIENT)
Dept: PHYSICAL THERAPY | Facility: CLINIC | Age: 88
End: 2023-02-19
Attending: HOSPITALIST
Payer: MEDICARE

## 2023-02-19 LAB
BNP SERPL-MCNC: 186 PG/ML (ref 0–167)
FOLATE SERPL-MCNC: 28.2 NG/ML (ref 4.6–34.8)
VIT B12 SERPL-MCNC: 680 PG/ML (ref 232–1245)

## 2023-02-19 PROCEDURE — 250N000013 HC RX MED GY IP 250 OP 250 PS 637: Performed by: HOSPITALIST

## 2023-02-19 PROCEDURE — 97161 PT EVAL LOW COMPLEX 20 MIN: CPT | Mod: GP

## 2023-02-19 PROCEDURE — G0378 HOSPITAL OBSERVATION PER HR: HCPCS

## 2023-02-19 PROCEDURE — 82746 ASSAY OF FOLIC ACID SERUM: CPT | Performed by: HOSPITALIST

## 2023-02-19 PROCEDURE — 97535 SELF CARE MNGMENT TRAINING: CPT | Mod: GO

## 2023-02-19 PROCEDURE — 97166 OT EVAL MOD COMPLEX 45 MIN: CPT | Mod: GO

## 2023-02-19 PROCEDURE — 97116 GAIT TRAINING THERAPY: CPT | Mod: GP

## 2023-02-19 PROCEDURE — 99232 SBSQ HOSP IP/OBS MODERATE 35: CPT | Performed by: INTERNAL MEDICINE

## 2023-02-19 PROCEDURE — 96372 THER/PROPH/DIAG INJ SC/IM: CPT | Performed by: INTERNAL MEDICINE

## 2023-02-19 PROCEDURE — 83880 ASSAY OF NATRIURETIC PEPTIDE: CPT | Performed by: INTERNAL MEDICINE

## 2023-02-19 PROCEDURE — 36415 COLL VENOUS BLD VENIPUNCTURE: CPT | Performed by: INTERNAL MEDICINE

## 2023-02-19 PROCEDURE — 250N000011 HC RX IP 250 OP 636: Performed by: INTERNAL MEDICINE

## 2023-02-19 RX ORDER — HEPARIN SODIUM 5000 [USP'U]/.5ML
5000 INJECTION, SOLUTION INTRAVENOUS; SUBCUTANEOUS EVERY 12 HOURS
Status: DISCONTINUED | OUTPATIENT
Start: 2023-02-19 | End: 2023-02-21 | Stop reason: HOSPADM

## 2023-02-19 RX ADMIN — FUROSEMIDE 10 MG: 20 TABLET ORAL at 10:04

## 2023-02-19 RX ADMIN — PRAVASTATIN SODIUM 60 MG: 20 TABLET ORAL at 20:08

## 2023-02-19 RX ADMIN — HEPARIN SODIUM 5000 UNITS: 5000 INJECTION, SOLUTION INTRAVENOUS; SUBCUTANEOUS at 18:22

## 2023-02-19 RX ADMIN — ESCITALOPRAM OXALATE 10 MG: 10 TABLET ORAL at 10:04

## 2023-02-19 RX ADMIN — ASPIRIN 81 MG: 81 TABLET, COATED ORAL at 20:08

## 2023-02-19 ASSESSMENT — ACTIVITIES OF DAILY LIVING (ADL)
ADLS_ACUITY_SCORE: 44
ADLS_ACUITY_SCORE: 48
ADLS_ACUITY_SCORE: 42
ADLS_ACUITY_SCORE: 44
ADLS_ACUITY_SCORE: 44
ADLS_ACUITY_SCORE: 51
ADLS_ACUITY_SCORE: 42
ADLS_ACUITY_SCORE: 44
ADLS_ACUITY_SCORE: 44
ADLS_ACUITY_SCORE: 42
ADLS_ACUITY_SCORE: 48
ADLS_ACUITY_SCORE: 42

## 2023-02-19 NOTE — PLAN OF CARE
"Goal Outcome Evaluation:    Problem: Muscle Strength Impairment  Goal: Improved Muscle Strength  Outcome: Progressing  Intervention: Optimize Muscle Strength  Recent Flowsheet Documentation  Taken 2/19/2023 1130 by Pilar Castro RN  Activity Management: activity adjusted per tolerance  Activity Assistance Provided: assistance, 2 people  Taken 2/19/2023 0800 by Pilar Castro RN  Activity Management: activity adjusted per tolerance  Activity Assistance Provided: assistance, 2 people   No complaints of pain.  Pt worked with PT/OT today and walked the halls with a gaitbelt and walker.  She has been very emotional today stating she \"didn't have family close and she is getting old.\"  Periodically throughout the day she would break out in tears.  She did have family come and see her this afternoon.  Pt uses her call light appropriately for assistance.                       "

## 2023-02-19 NOTE — PROGRESS NOTES
PRIMARY DIAGNOSIS: GENERALIZED WEAKNESS    OUTPATIENT/OBSERVATION GOALS TO BE MET BEFORE DISCHARGE  1. Orthostatic performed: No    2. Tolerating PO medications: Yes    3. Return to near baseline physical activity: No    4. Cleared for discharge by consultants (if involved): No    5. Diagnostic tests and consults completed and resulted: Yes     6. Vital signs normal or at patient baseline: Yes    7. Safe disposition plan has been identified: No     Nurse to notify provider when observation goals have been met and patient is ready for discharge.

## 2023-02-19 NOTE — H&P
"[facility] MEDICINE ADMISSION HISTORY AND PHYSICAL     Brief Synopsis:     Isamar Little is a 89 year old female who presented with complaints of increased weakness, lower extremity weakness, right leg swelling, recent fall.    Medical history is notable for anxiety and depression, permanent pacemaker implant, chronic diastolic congestive heart failure, coronary disease, essential hypertension, reflux, A-fib.    Home medications include aspirin, Lexapro, Lasix 10 mg daily, melatonin at bedtime, nitroglycerin as needed, pravastatin at bedtime    Initial evaluation revealed mildly elevated blood pressure otherwise normal vital signs, unremarkable basic metabolic panel, LFTs, lactic acid, troponin, CBC.  Urine negative for infection.  COVID, influenza, RSV negative.  CT head negative.  Right lower extremity ultrasound negative for DVT.    Initial treatment included 500 mL normal saline, melatonin.    Assessment and Plan:  Fatigue with inability to stand or ambulate independently  Denies lower extremity pain  Will check total CK, TSH  PT and OT evals  Monitor for any new bowel or bladder incontinence    Permanent pacemaker implant  Chronic diastolic congestive heart failure with preserved ejection fraction, not in acute exacerbation  Atrial fibrillation by history, not on rate controlling medications or anticoagulation  Coronary artery disease  Gastroesophageal reflux  Anxiety and depression    Clinically Significant Risk Factors Present on Admission                       # Overweight: Estimated body mass index is 27.67 kg/m  as calculated from the following:    Height as of 2/15/23: 1.499 m (4' 11\").    Weight as of 2/15/23: 62.1 kg (137 lb).             DVTP: Mechanical Prophylaxis/ Sequential Compression Devices  Code Status: No CPR- Do NOT Intubate  Disposition: Observation   Diet: Regular  Fluids: None    Disposition Plan      Expected Discharge Date: 02/19/2023    Discharge Delays: " OT Disposition recs needed  Placement - TCU  PT Disposition recs needed  Destination: other (comment) (TCU)         Chief Complaint  weakness     HISTORY   Isamar Little is a 89 year old female who presented with complaints of feeling weak in the legs, right leg swelling.    Denies chest pain, shortness of breath, abdominal pain, nausea, vomiting, dysuria.    Per ED provider:  Isamar Little is a 89 year old female with a pertinent history of CAD, stent X 2, pacemaker,  HTN, hyperlipidemia,  who presents to this ED by EMS for evaluation of generalized weakness and leg swelling.      Per chart review, patient was seen in ER for evaluation of a fall on 2/15/2023. She has a moderate-sized posterior scalp hematoma but no open laceration.  CT imaging of the head, neck, and low back were obtained showing no signs of acute injuries     Patient has been having increased weakness and right leg swelling since last Wednesday. She says she had a episode where she was sitting and she couldn't get up because her body was so weak. She also notes bilateral leg swelling but her right leg is greater than the left. Patient has a bruise on her right elbow from her fall on the 15th. Patient denies being on blood thinners. Patient denies chest pain, lightheadedness, dizziness, fever, cough, and abdominal pain.   Past Medical History     Past Medical History:  No date: Adjustment disorder with mixed anxiety and depressed mood  No date: Anxiety  No date: Anxiety      Comment:  Previously on sertraline  No date: Anxiety state, unspecified  No date: Arthritis  5/16/2017: Atherosclerotic heart disease of native coronary artery   without angina pectoris  No date: Bereavement, uncomplicated  5/31/2017: Cardiac pacemaker in situ      Comment:  [employer] [facility] DOI: 5/17/17 Dr. David Patel  6/28/2018: Chronic diastolic congestive heart failure (H)  7/13/2020: Chronic edema  4/23/2019: Closed fracture of tooth with routine  healing  No date: Coronary artery disease  No date: Coronary atherosclerosis of native coronary artery  No date: Depressive disorder, not elsewhere classified  No date: Edema  No date: Essential hypertension with goal blood pressure less than   140/90  No date: Essential hypertension, benign  No date: Gastritis  7/13/2020: Gastroesophageal reflux disease  No date: Hyperlipidemia  No date: Hyperlipidemia with target LDL less than 70  No date: Hypertension  7/13/2020: Osteoporosis without current pathological fracture,   unspecified osteoporosis type  No date: Other and unspecified hyperlipidemia  No date: Other malaise and fatigue  No date: Paroxysmal atrial fibrillation (H)  7/13/2020: Primary insomnia  7/13/2020: Primary osteoarthritis  No date: Skin cancer  No date: Skin cancer  No date: SSS (sick sinus syndrome) (H)  No date: Stented coronary artery      Comment:  STENT X 2     Surgical History     Past Surgical History:   Procedure Laterality Date     ANGIOPLASTY  06/2008    stents x3     CAPSULOTOMY Left 1/2014     CARDIAC SURGERY  06/11/2008    angioplasty w/stent     CATARACT EXTRACTION, BILATERAL  1/2010     DILATION AND CURETTAGE      X3     ENT SURGERY      T&A     EYE SURGERY Bilateral     IOL     EYE SURGERY Left     YAG      GYN SURGERY      D&C X 3     IMPLANT PACEMAKER       IR ATHERECTOMY  6/11/2008     MOHS MICROGRAPHIC PROCEDURE Left 8/19/2014    Procedure: MOHS MICROGRAPHIC PROCEDURE;  Surgeon: Hector Manjarrez MD;  Location: [facility]     TONSILLECTOMY & ADENOIDECTOMY       Family History      Family History   Problem Relation Age of Onset     Hypertension Mother      Hypertension Father      Cerebrovascular Disease Father      Kidney Disease Maternal Grandmother      Kidney Disease Maternal Grandfather      Early Death Maternal Grandfather      Heart Disease Maternal Grandfather      Kidney Disease Paternal Grandmother      Kidney Disease Paternal Grandfather      Early Death Paternal  Grandfather      Heart Disease Paternal Grandfather      Coronary Artery Disease Sister      Coronary Artery Disease Brother      Dementia Brother         disabled vet      Social History      Social History     Tobacco Use     Smoking status: Never     Smokeless tobacco: Never   Substance Use Topics     Alcohol use: No     Drug use: No      Allergies     Allergies   Allergen Reactions     Cephalexin Rash     Prior to Admission Medications      Prior to Admission Medications   Prescriptions Last Dose Informant Patient Reported? Taking?   aspirin (ASA) 81 MG EC tablet 2/17/2023  Yes Yes   Sig: Take 81 mg by mouth every evening   escitalopram (LEXAPRO) 10 MG tablet 2/18/2023 at AM  Yes Yes   Sig: Take 10 mg by mouth daily   furosemide (LASIX) 20 MG tablet 2/18/2023 at AM  Yes Yes   Sig: Take 10 mg by mouth daily   melatonin 3 MG tablet Past Month  Yes Yes   Sig: Take 3 mg by mouth nightly as needed   nitroGLYcerin (NITROSTAT) 0.4 MG sublingual tablet   Yes Yes   Sig: Place 0.4 mg under the tongue every 5 minutes as needed   pravastatin (PRAVACHOL) 40 MG tablet 2/17/2023  No Yes   Sig: Take 1.5 tablets (60 mg) by mouth At Bedtime      Facility-Administered Medications: None      Review of Systems     A 12 point comprehensive review of systems was negative except as noted above in HPI.    PHYSICAL EXAMINATION     Vitals      Temp:  [98.3  F (36.8  C)] 98.3  F (36.8  C)  Pulse:  [73-78] 73  Resp:  [18-19] 19  BP: (159-172)/(71-78) 172/71  SpO2:  [96 %] 96 %    Examination   Physical Exam:    Gen: no acute distress, comfortable, alert, pleasant but tired  ENT: no scleral icterus, she does have facial bruising  Pulm: lungs are clear anteriorly, breathing comfortably on room air at rest while laying flat  CV: regular rate and rhythm, did not appreciate pitting edema of the lower extremity  GI: abdomen is soft, non-tender, non-distended with active bowel sounds.  Psych: appropriate affect      Pertinent Radiology      Radiology Results:   Recent Results (from the past 24 hour(s))   US Lower Extremity Venous Duplex Right    Narrative    EXAM: US LOWER EXTREMITY VENOUS DUPLEX RIGHT  LOCATION: [facility]  DATE/TIME: 2/18/2023 5:09 PM    INDICATION: Right leg swelling and edema.  COMPARISON: None.  TECHNIQUE: Venous Duplex ultrasound of the right lower extremity with and without compression, augmentation and duplex. Color flow and spectral Doppler with waveform analysis performed.    FINDINGS: Exam includes the common femoral, femoral, popliteal, and contralateral common femoral veins as well as segmentally visualized deep calf veins and greater saphenous vein.     RIGHT: No deep vein thrombosis. No superficial thrombophlebitis. No popliteal cyst.      Impression    IMPRESSION:  1.  No deep venous thrombosis in the right lower extremity.   Head CT w/o contrast    Narrative    EXAM: CT HEAD W/O CONTRAST  LOCATION: [facility]  DATE/TIME: 2/18/2023 5:15 PM    INDICATION: Weakness, fall earlier this month, negative CT head, escalated symptoms, rule out subdural.  COMPARISON: None.  TECHNIQUE: Routine CT Head without IV contrast. Multiplanar reformats. Dose reduction techniques were used.    FINDINGS:  No evidence of intracranial hemorrhage. Volume loss and white matter hypoattenuation likely represents chronic small vessel ischemic change. No acute osseous abnormality. Right parietal scalp contusion.      Impression    IMPRESSION:  1.  No acute intracranial abnormality.     EKG Results: Reviewed    Honorio Teresa, [facility]  [facility]   Phone: #[phone]

## 2023-02-19 NOTE — PROGRESS NOTES
02/19/23 3330   Appointment Info   Signing Clinician's Name / Credentials (PT) Estefany Awad PT   Quick Adds   Quick Adds Certification   Living Environment   People in Home alone   Current Living Arrangements independent living facility   Home Accessibility no concerns   Self-Care   Usual Activity Tolerance good   Current Activity Tolerance good   Equipment Currently Used at Home walker, rolling  (4WW)   Fall history within last six months yes   Number of times patient has fallen within last six months 3   Activity/Exercise/Self-Care Comment history of falls, has declined an alert system, but states she will reconsider   General Information   Onset of Illness/Injury or Date of Surgery 02/18/23   Referring Physician Pattie Talavera   Patient/Family Therapy Goals Statement (PT) go home   Pertinent History of Current Problem (include personal factors and/or comorbidities that impact the POC) falls, LE swelling, CHF, afib, adjustment disorder   Existing Precautions/Restrictions fall   Cognition   Affect/Mental Status (Cognition) emotionally labile   Strength (Manual Muscle Testing)   Strength Comments general deconditioning   Bed Mobility   Bed Mobility supine-sit;sit-supine   Supine-Sit Hardy (Bed Mobility) supervision   Sit-Supine Hardy (Bed Mobility) supervision   Bed Mobility Limitations decreased ability to use legs for bridging/pushing;decreased ability to use arms for pushing/pulling   Impairments Contributing to Impaired Bed Mobility decreased strength   Assistive Device (Bed Mobility) bed rails   Comment, (Bed Mobility) increased effort   Transfers   Transfers sit-stand transfer   Impairments Contributing to Impaired Transfers decreased strength   Sit-Stand Transfer   Sit-Stand Hardy (Transfers) supervision   Assistive Device (Sit-Stand Transfers) walker, front-wheeled   Gait/Stairs (Locomotion)   Hardy Level (Gait) supervision   Assistive Device (Gait) walker, front-wheeled    Distance in Feet 250   Distance in Feet (Gait) 250   Deviations/Abnormal Patterns (Gait) stride length decreased;gait speed decreased;marbella decreased   Clinical Impression   Criteria for Skilled Therapeutic Intervention Yes, treatment indicated   PT Diagnosis (PT) impaired functional mobility   Influenced by the following impairments weakness, dec balance   Functional limitations due to impairments transfers, gait   Clinical Presentation (PT Evaluation Complexity) Stable/Uncomplicated   Clinical Presentation Rationale pt presents as medially daignosed   Clinical Decision Making (Complexity) low complexity   Planned Therapy Interventions (PT) gait training;home exercise program;patient/family education;strengthening;transfer training   Anticipated Equipment Needs at Discharge (PT) walker, rolling   Risk & Benefits of therapy have been explained evaluation/treatment results reviewed;patient;care plan/treatment goals reviewed   PT Total Evaluation Time   PT Eval, Low Complexity Minutes (34863) 10   Therapy Certification   Start of care date 02/19/23   Certification date from 02/19/23   Certification date to 03/18/23   Medical Diagnosis CHF, afib, weakness, falls   Physical Therapy Goals   PT Frequency Daily   PT Predicted Duration/Target Date for Goal Attainment 02/24/23   PT Goals Transfers;Gait   PT: Transfers Modified independent;Sit to/from stand;Assistive device   PT: Gait Modified independent;Rolling walker;Greater than 200 feet   Interventions   Interventions Quick Adds Gait Training   Gait Training   Gait Training Minutes (33448) 20   Symptoms Noted During/After Treatment (Gait Training) fatigue   Treatment Detail/Skilled Intervention cues for safety with FWW management, educated in amb guidelines   [place] Level (Gait Training) stand-by assist   Physical Assistance Level (Gait Training) supervision   Weight Bearing (Gait Training) full weight-bearing   Assistive Device (Gait Training) rolling walker    Pattern Analysis (Gait Training) swing-to gait   Gait Analysis Deviations decreased marbella;decreased step length;decreased toe-to-floor clearance  (repeated collision of FWW with obstacles)   Impairments (Gait Analysis/Training) strength decreased   PT Discharge Planning   PT Plan gait, strengthening, balance training   PT Discharge Recommendation (DC Rec) home with assist;home with home care physical therapy;Transitional Care Facility  (24 hour assist)   PT Rationale for DC Rec history of falls, deconditioning, high fall risk   PT Brief overview of current status amb with FWW and  feet   Total Session Time   Timed Code Treatment Minutes 20   Total Session Time (sum of timed and untimed services) 30     [facility]  OUTPATIENT PHYSICAL THERAPY EVALUATION  PLAN OF TREATMENT FOR OUTPATIENT REHABILITATION  (COMPLETE FOR INITIAL CLAIMS ONLY)  Patient's Last Name, First Name, M.I.  YOB: 1928  Isamar Little                        Provider's Name  [facility] Medical Record No.  [id number]                             Onset Date:  02/18/23   Start of Care Date:  02/19/23   Type:     _X_PT   ___OT   ___SLP Medical Diagnosis:  CHF, afib, weakness, falls              PT Diagnosis:  impaired functional mobility Visits from SOC:  1     See note for plan of treatment, functional goals and certification details    I CERTIFY THE NEED FOR THESE SERVICES FURNISHED UNDER        THIS PLAN OF TREATMENT AND WHILE UNDER MY CARE     (Physician co-signature of this document indicates review and certification of the therapy plan).

## 2023-02-19 NOTE — PROGRESS NOTES
[facility] Medicine PROGRESS NOTE      Identification/Summary:   Isamar Little is a 89 year old female who presented with complaints of increased weakness, lower extremity weakness, right leg swelling, recent fall.    Medical history is notable for anxiety and depression, permanent pacemaker implant, chronic diastolic congestive heart failure, coronary disease, essential hypertension, reflux, A-fib    Initial evaluation revealed mildly elevated blood pressure otherwise normal vital signs, unremarkable basic metabolic panel, LFTs, lactic acid, troponin, CBC.  Urine negative for infection.  COVID, influenza, RSV negative.  CT head negative.  Right lower extremity ultrasound negative for DVT.      Assessment and Plan:  Fatigue with inability to stand or ambulate independently  General weakness   Denies lower extremity pain  No focal deficits.  TSH, B12 normal  Folate pending  PT and OT evals  Monitor for any new bowel or bladder incontinence     Permanent pacemaker implant  Chronic diastolic congestive heart failure with preserved ejection fraction, not in acute exacerbation  Appears compensated  BNP is mildly elevated  Continue home Lasix    Atrial fibrillation by history, not on rate controlling medications or anticoagulation  Coronary artery disease  Gastroesophageal reflux  Anxiety and depression  Continue other home medications.     Skin lesions  Seeing dermatology as outpatient.  Reports they are precancerous and is being treated.    Diet: Regular Diet Adult  DVT Prophylaxis:   Start subcu heparin  Code Status: No CPR- Do NOT Intubate    Anticipated possible discharge in 1 days once discharge planmilestones are met.    Interval History/Subjective:  She is sad and emotional about not having any of the family members alive.  Denied any other trouble breathing, chest pain, urinary or bowel complaints.  She has not been eating much recently and had increasing generalized weakness with recent fall few days  "ago.    Physical Exam/Objective:  Vitals I/O   Vital signs:  Temp: 97.8  F (36.6  C) Temp src: Oral BP: 133/61 Pulse: 66   Resp: 20 SpO2: 97 % O2 Device: None (Room air)        Estimated body mass index is 27.67 kg/m  as calculated from the following:    Height as of 2/15/23: 1.499 m (4' 11\").    Weight as of 2/15/23: 62.1 kg (137 lb). I/O last 3 completed shifts:  In: -   Out: 420 [Urine:420]       General Appearance:  Alert, cooperative, no distress   Head:  Normocephalic, atraumatic   Eyes:  PERRL    Throat:  mucosa; moist   Neck: No JVD, thyromegaly   Lungs:   Clear to auscultation bilaterally, respirations unlabored   Chest Wall:  No tenderness or deformity   Heart:  Regular rate and rhythm, S1, S2 normal,no murmur   Abdomen:   Soft, non tender, non distended, bowel sounds present, no guarding or rigidity   Extremities:  no joint swelling trace edema in right lower extremity   Skin:  Hyperkeratotic lesions over the legs, on the face   Neurologic: Alert and oriented X 3, no focal deficits.  Moves all 4 extremities       Medications:   Personally Reviewed.    aspirin  81 mg Oral QPM     escitalopram  10 mg Oral Daily     furosemide  10 mg Oral Daily     pravastatin  60 mg Oral At Bedtime       Data reviewed today: I personally reviewed all new medications, labs, imaging/diagnostics reports over the past 24 hours. Pertinent findings include    Labs:  Most Recent 3 CBC's:Recent Labs   Lab Test 02/18/23  1628 02/15/23  1800 12/09/19  0815   WBC 7.2 8.2 7.5   HGB 13.8 15.2 15.0   MCV 94 95 95    321 327     Most Recent 3 BMP's:Recent Labs   Lab Test 02/18/23  1733 02/15/23  1800 04/06/22  1115    139 142   POTASSIUM 3.6 3.7 4.4   CHLORIDE 108* 104 104   CO2 21* 23 26   BUN 12 14.6 15   CR 0.62 0.62 0.76   ANIONGAP 13 12 12   CHANTAL 8.7 9.0 9.5    115* 89     Most Recent 2 LFT's:Recent Labs   Lab Test 02/18/23  1733 04/06/22  1115   AST 31 24   ALT 20 18   ALKPHOS 86 80   BILITOTAL 0.8 0.6 "       Imaging:   Recent Results (from the past 24 hour(s))   US Lower Extremity Venous Duplex Right    Narrative    EXAM: US LOWER EXTREMITY VENOUS DUPLEX RIGHT  LOCATION: [facility]  DATE/TIME: 2/18/2023 5:09 PM    INDICATION: Right leg swelling and edema.  COMPARISON: None.  TECHNIQUE: Venous Duplex ultrasound of the right lower extremity with and without compression, augmentation and duplex. Color flow and spectral Doppler with waveform analysis performed.    FINDINGS: Exam includes the common femoral, femoral, popliteal, and contralateral common femoral veins as well as segmentally visualized deep calf veins and greater saphenous vein.     RIGHT: No deep vein thrombosis. No superficial thrombophlebitis. No popliteal cyst.      Impression    IMPRESSION:  1.  No deep venous thrombosis in the right lower extremity.   Head CT w/o contrast    Narrative    EXAM: CT HEAD W/O CONTRAST  LOCATION: [facility]  DATE/TIME: 2/18/2023 5:15 PM    INDICATION: Weakness, fall earlier this month, negative CT head, escalated symptoms, rule out subdural.  COMPARISON: None.  TECHNIQUE: Routine CT Head without IV contrast. Multiplanar reformats. Dose reduction techniques were used.    FINDINGS:  No evidence of intracranial hemorrhage. Volume loss and white matter hypoattenuation likely represents chronic small vessel ischemic change. No acute osseous abnormality. Right parietal scalp contusion.      Impression    IMPRESSION:  1.  No acute intracranial abnormality.        > 30 MINUTES SPENT BY ME on the date of service doing chart review, history, exam, documentation & further activities per the note.    Pattie Talavera MD  Hospitalist  [facility]

## 2023-02-19 NOTE — ED NOTES
Pt taken to bathroom via wheelchair to provide a urine sample. Pt very difficult to get up off the toilet with 1 assist due to weakness. Used assist of 2 to get back into bed

## 2023-02-19 NOTE — PROGRESS NOTES
PRIMARY DIAGNOSIS: GENERALIZED WEAKNESS     OUTPATIENT/OBSERVATION GOALS TO BE MET BEFORE DISCHARGE  1. Orthostatic performed: No     2. Tolerating PO medications: Yes     3. Return to near baseline physical activity: No     4. Cleared for discharge by consultants (if involved): No     5. Diagnostic tests and consults completed and resulted: Yes     6. Vital signs normal or at patient baseline: Yes    7. Safe disposition plan has been identified: No, potentially needs TCU placement.      Nurse to notify provider when observation goals have been met and patient is ready for discharge.    Outcome Evaluation: Slept between care overnight. Blood pressures improved. Plans to work with PT/OT today.

## 2023-02-19 NOTE — CONSULTS
Care Management Follow Up    Length of Stay (days): 0    Expected Discharge Date: 02/20/2023     Concerns to be Addressed: discharge planning, home safety, other (see comments) (Transportation)  Lives alone at [facility]    Patient plan of care discussed at interdisciplinary rounds:   Anticipated Discharge Disposition: Skilled Nursing Facility, Transitional Care     Anticipated Discharge Services: TCU    Anticipated Discharge DME: Other (see comment) (Pending clinical progress)    Patient/family educated on Medicare website which has current facility and service quality ratings:      Education Provided on the Discharge Plan:   Yes  Patient/Family in Agreement with the Plan:  (Yes, patient and nitacos Reynoso who lives in [place])    Referrals Placed by CM/SW:  Yes.  [facility], [facility], [facility], [facility], [facility], and [facility].     Private pay costs discussed: Yes, [facility] stretcher and wheelchair ground transport and per mile costs discussed with Angeles and she agreed to costs for wheelchair transport for patient on discharge.    Additional Information:  Writer updated patient's niece Angeles from [place] ([phone]) that patient will be staying overnight at [facility]. Angeles stated that patient's friend Amy will bring clothing for patient to take for TCU.     Angeles clarified that she doesn't feel comfortable with patient discharging to home with friends before going to TCU. Patient lives in an independent living facility and Angeles doesn't want to burden patient's friends with patient's care. Angeles will keep in touch with TCU facility to see what discharge recommendations they have. Hopes patient can return to independent living.      SHARONA BADILLO, RN/CM

## 2023-02-19 NOTE — ED NOTES
Patient requesting something to help with sleeping states she takes melatonin but she is unsure of dose. Provider notified. Will wait until patient is admitted and has talked with hospitalist before providing medication to sleep.

## 2023-02-19 NOTE — CONSULTS
Care Management Initial Consult    General Information  Assessment completed with: Patient, Friend, Patient, friend Jessica at bedside, and Tha Reynoso via phone  Type of CM/SW Visit: Initial Assessment    Primary Care Provider verified and updated as needed: Yes   Readmission within the last 30 days: no previous admission in last 30 days      Reason for Consult: discharge planning, facility placement  Advance Care Planning: Advance Care Planning Reviewed: other (see comments) (Declined Honoring Choices)     General Information Comments: Lives alone at [facility] living    Communication Assessment  Patient's communication style: spoken language (English or Bilingual)    Hearing Difficulty or Deaf: yes   Wear Glasses or Blind: yes    Cognitive  Cognitive/Neuro/Behavioral:  WDL                Living Environment:   People in home: alone     Current living Arrangements: apartment, independent living facility      Able to return to prior arrangements: no (Unable to care for self due to weakness)  Living Arrangement Comments: Lives alone, no services    Family/Social Support:  Care provided by: self, friend  Provides care for: no one, unable/limited ability to care for self  Marital Status:   Other (specify) (Friends)          Description of Support System: Supportive, Involved    Support Assessment: Lacks necessary supervision and assistance, Lacks adequate physical care    Current Resources:   Patient receiving home care services: No     Community Resources: None  Equipment currently used at home: walker, rolling  Supplies currently used at home: Incontinence Supplies, Hearing Aid Batteries    Employment/Financial:  Employment Status: retired        Financial Concerns: No concerns identified         Lifestyle & Psychosocial Needs:  Social Determinants of Health     Tobacco Use: Low Risk      Smoking Tobacco Use: Never     Smokeless Tobacco Use: Never     Passive Exposure: Not on file   Alcohol Use:  Not on file   Financial Resource Strain: Not on file   Food Insecurity: Not on file   Transportation Needs: Not on file   Physical Activity: Not on file   Stress: Not on file   Social Connections: Not on file   Intimate Partner Violence: Not on file   Depression: Not on file   Housing Stability: Not on file       Functional Status:  Prior to admission patient needed assistance:   Dependent ADLs:: Ambulation-walker, Bathing, Transfers, Toileting  Dependent IADLs:: Cleaning, Cooking, Laundry, Shopping, Meal Preparation, Transportation, Medication Management  Assesssment of Functional Status: Not at  functional baseline, Needs placement in a SNF/TCF for rehabilitation    Mental Health Status:          Chemical Dependency Status:              Values/Beliefs:  Spiritual, Cultural Beliefs, [employer] Practices, Values that affect care:                 Additional Information:   Isamar Little is a 89-year old female with a pertinent history of CAD, stent X 2, pacemaker,  HTN, hyperlipidemia,  who presents to this ED by EMS for evaluation of generalized weakness and leg swelling. Patient has been having increased weakness and right leg swelling since last Wednesday. She says she had an episode where she was sitting and she couldn't get up because her body was so weak. She also notes bilateral leg swelling but her right leg is greater than the left .    Information gathered from patient and friend Jessica at bedside. Also, from patient s erica Angeles Hurst who is patient s only living relative and lives in [place], via phone ([phone]). Patient reports she lives at [facility]. Uses a roller-walker; no home care services; doesn t drive. Patient has two friends in the building who assist with grocery shopping, laundry, housekeeping, and transportation. Patient cooks most of her meals in microwave. Per erica Reynoso, patient has meals delivered three days per week through [employer].    Discussed  TCU facilities with patient and friend Jessica who stated this writer contact erica for decision about facilities. Writer did speak with aayushtacos Reynoso who stated that patient didn t necessarily need to go to a [facility] TCU and that patient is her own decision maker and signs her own papers. Explained to niece and patient MCKEON/Observation Status and they verbalized understanding.    Writer discussed discharge transportation and erica Reynoso requested patient be transported on discharge via medical wheelchair transport. Writer gave Angeles approximate base cost for [facility] stretcher and wheelchair ground transport and per mile costs and Angeles agreed to costs for wheelchair transport. Facilities agreed by patient and erica Reynoso in order of preference and initial referrals sent to  [facility], [facility], [facility], [facility], [facility], and [facility].       SHARONA BADILLO, RN/CM

## 2023-02-19 NOTE — PROGRESS NOTES
"   02/19/23 1110   Appointment Info   Signing Clinician's Name / Credentials (OT) Elaine Álvarez, OTR   Quick Adds   Quick Adds Certification   Living Environment   People in Home alone   Current Living Arrangements independent living facility   Living Environment Comments walk-in shower wih built in bench   Self-Care   Usual Activity Tolerance good   Current Activity Tolerance moderate   Equipment Currently Used at Home walker, rolling;other (see comments)  (shoe horn)   Fall history within last six months yes   Number of times patient has fallen within last six months 3   Activity/Exercise/Self-Care Comment ind with ADLs; recieves assistance with most IADLs, pt cooks frozen meals in the microwave   General Information   Onset of Illness/Injury or Date of Surgery 02/18/23   Referring Physician Dr. Pattie Talavera   Patient/Family Therapy Goal Statement (OT) to get stronger   Additional Occupational Profile Info/Pertinent History of Current Problem generalized weakness, anxiety, depression, falls   Existing Precautions/Restrictions fall   Cognitive Status Examination   Orientation Status orientation to person, place and time   Affect/Mental Status (Cognitive) sad/depressed affect   Follows Commands WNL   Cognitive Status Comments pt was tearful during the session and stated that she \"is sad that she does not have any family around\"   Visual Perception   Visual Impairment/Limitations corrective lenses full-time   Sensory   Sensory Quick Adds sensation intact   Pain Assessment   Patient Currently in Pain Yes, see Vital Sign flowsheet   Posture   Posture not impaired   Range of Motion Comprehensive   General Range of Motion no range of motion deficits identified   Strength Comprehensive (MMT)   General Manual Muscle Testing (MMT) Assessment other (see comments)   Comment, General Manual Muscle Testing (MMT) Assessment generalized weakness   Muscle Tone Assessment   Muscle Tone Quick Adds No deficits were identified "   Coordination   Upper Extremity Coordination No deficits were identified   Bed Mobility   Bed Mobility supine-sit;sit-supine   Supine-Sit [place] (Bed Mobility) supervision   Sit-Supine [place] (Bed Mobility) minimum assist (75% patient effort)   Transfers   Transfers bed-chair transfer;sit-stand transfer;toilet transfer   Transfer Skill: Bed to Chair/Chair to Bed   Bed-Chair [place] (Transfers) contact guard   Sit-Stand Transfer   Sit-Stand [place] (Transfers) contact guard   Toilet Transfer   [place] Level (Toilet Transfer) minimum assist (75% patient effort)   Balance   Balance Assessment standing balance: static;standing balance: dynamic   Balance Comments slight LOB while standing at the sink   Activities of Daily Living   BADL Assessment/Intervention lower body dressing;grooming   Lower Body Dressing Assessment/Training   [place] Level (Lower Body Dressing) moderate assist (50% patient effort)   Grooming Assessment/Training   [place] Level (Grooming) contact guard assist   Clinical Impression   Criteria for Skilled Therapeutic Interventions Met (OT) Yes, treatment indicated   OT Diagnosis decreased ADL independence   Influenced by the following impairments generalized weakness, anxiety, depression, falls   OT Problem List-Impairments impacting ADL problems related to;balance;strength;pain   Assessment of Occupational Performance 3-5 Performance Deficits   Identified Performance Deficits dressing, toileting, showering, bed mob   Planned Therapy Interventions (OT) ADL retraining;strengthening   Clinical Decision Making Complexity (OT) moderate complexity   Risk & Benefits of therapy have been explained evaluation/treatment results reviewed   OT Total Evaluation Time   OT Eval, Moderate Complexity Minutes (16607) 10   Therapy Certification   Medical Diagnosis Generalized weakness   Start of Care Date 02/19/23   Certification date from 02/19/23   Certification date to 03/19/23    OT Goals   Therapy Frequency (OT) Daily   OT Predicted Duration/Target Date for Goal Attainment 02/26/23   OT Goals Hygiene/Grooming;Toilet Transfer/Toileting;Lower Body Dressing   OT: Hygiene/Grooming modified independent;while standing   OT: Lower Body Dressing Modified independent;using adaptive equipment   OT: Toilet Transfer/Toileting Modified independent;using adaptive equipment   Interventions   Interventions Quick Adds Self-Care/Home Management   Self-Care/Home Management   Self-Care/Home Mgmt/ADL, Compensatory, Meal Prep Minutes (41313) 25   Symptoms Noted During/After Treatment (Meal Preparation/Planning Training) increased pain   Treatment Detail/Skilled Intervention Pt educ on the role of OT and PLB for pain. Pt SBA for supine to sit bed mob and Min A for sit to supine d/t needing assitance lifting her legs up onto the bed. CGA for STS transfer from bed and chair with FWW. Min A with STS transfer from toilet with FWW. Pt experienced slight LOB while standing at the sink and was able to self correct balance. Mod A for LB dressing, pt donned/doffed shorts while seated and standing with Min A and needed assistance to don socks while seated. Pt reports that she does not usually wear socks and declined using a sock aid. CGA for g/h while standing at the sink.   OT Discharge Planning   OT Plan 2/26: TB dressing with AE as needed, standing activity tolerance   OT Discharge Recommendation (DC Rec) home with home care occupational therapy   OT Rationale for DC Rec Pt requires Min A for dressing and CGA for safety with func mob and transfers. Pt would benefit from home OT for home and ADL safety.   OT Brief overview of current status CGA for func mob, standing activities at the sink and transfers. Min A for dressing with AE.   Total Session Time   Timed Code Treatment Minutes 25   Total Session Time (sum of timed and untimed services) 35    [facility]  OUTPATIENT OCCUPATIONAL  THERAPY  EVALUATION  PLAN OF TREATMENT FOR OUTPATIENT REHABILITATION  (COMPLETE FOR INITIAL CLAIMS ONLY)  Patient's Last Name, First Name, M.I.  YOB: 1928  Isamar Little                          Provider's Name  [facility] Medical Record No.  [id number]                             Onset Date:  02/18/23   Start of Care Date:  02/19/23   Type:     ___PT   _X_OT   ___SLP Medical Diagnosis:  Generalized weakness                    OT Diagnosis:  decreased ADL independence Visits from SOC:  1     See note for plan of treatment, functional goals and certification details    I CERTIFY THE NEED FOR THESE SERVICES FURNISHED UNDER        THIS PLAN OF TREATMENT AND WHILE UNDER MY CARE     (Physician co-signature of this document indicates review and certification of the therapy plan).

## 2023-02-20 ENCOUNTER — APPOINTMENT (OUTPATIENT)
Dept: PHYSICAL THERAPY | Facility: CLINIC | Age: 88
End: 2023-02-20
Payer: MEDICARE

## 2023-02-20 ENCOUNTER — APPOINTMENT (OUTPATIENT)
Dept: OCCUPATIONAL THERAPY | Facility: CLINIC | Age: 88
End: 2023-02-20
Payer: MEDICARE

## 2023-02-20 PROCEDURE — 97112 NEUROMUSCULAR REEDUCATION: CPT | Mod: GP

## 2023-02-20 PROCEDURE — 97530 THERAPEUTIC ACTIVITIES: CPT | Mod: GP

## 2023-02-20 PROCEDURE — 97116 GAIT TRAINING THERAPY: CPT | Mod: GP

## 2023-02-20 PROCEDURE — 250N000013 HC RX MED GY IP 250 OP 250 PS 637: Performed by: HOSPITALIST

## 2023-02-20 PROCEDURE — 99231 SBSQ HOSP IP/OBS SF/LOW 25: CPT | Performed by: INTERNAL MEDICINE

## 2023-02-20 PROCEDURE — 96372 THER/PROPH/DIAG INJ SC/IM: CPT | Performed by: INTERNAL MEDICINE

## 2023-02-20 PROCEDURE — G0378 HOSPITAL OBSERVATION PER HR: HCPCS

## 2023-02-20 PROCEDURE — 250N000011 HC RX IP 250 OP 636: Performed by: INTERNAL MEDICINE

## 2023-02-20 PROCEDURE — 97535 SELF CARE MNGMENT TRAINING: CPT | Mod: GO

## 2023-02-20 RX ADMIN — HEPARIN SODIUM 5000 UNITS: 5000 INJECTION, SOLUTION INTRAVENOUS; SUBCUTANEOUS at 17:39

## 2023-02-20 RX ADMIN — ESCITALOPRAM OXALATE 10 MG: 10 TABLET ORAL at 08:21

## 2023-02-20 RX ADMIN — ASPIRIN 81 MG: 81 TABLET, COATED ORAL at 20:56

## 2023-02-20 RX ADMIN — PRAVASTATIN SODIUM 60 MG: 20 TABLET ORAL at 21:00

## 2023-02-20 RX ADMIN — ACETAMINOPHEN 650 MG: 325 TABLET ORAL at 21:06

## 2023-02-20 RX ADMIN — ACETAMINOPHEN 650 MG: 325 TABLET ORAL at 08:21

## 2023-02-20 RX ADMIN — FUROSEMIDE 10 MG: 20 TABLET ORAL at 08:21

## 2023-02-20 ASSESSMENT — ACTIVITIES OF DAILY LIVING (ADL)
ADLS_ACUITY_SCORE: 51

## 2023-02-20 NOTE — PLAN OF CARE
"  Problem: Fall Injury Risk  Goal: Absence of Fall and Fall-Related Injury  Outcome: Progressing   PRIMARY DIAGNOSIS: \"GENERIC\" NURSING  OUTPATIENT/OBSERVATION GOALS TO BE MET BEFORE DISCHARGE:  1. ADLs back to baseline: No    2. Activity and level of assistance: Up with standby assistance.    3. Pain status: Improved-controlled with oral pain medications.    4. Return to near baseline physical activity: No     Goal Outcome Evaluation:  Pt forgetful, and can become tearful at times. SBA/walker/GB with ambulation.PRN PO acetaminophen for pain. Tha called for updates. Alarms on, call light within reach.                         "

## 2023-02-20 NOTE — PLAN OF CARE
PRIMARY DIAGNOSIS: GENERALIZED WEAKNESS    OUTPATIENT/OBSERVATION GOALS TO BE MET BEFORE DISCHARGE  1. Orthostatic performed: N/A this shift    2. Tolerating PO medications: Yes Pt tolerated PO intake, pt did not take any PO meds yet this shift.    3. Return to near baseline physical activity: No    4. Cleared for discharge by consultants (if involved): N/A    Discharge Planner Nurse   Safe discharge environment identified: No  Barriers to discharge: Yes       Entered by: Winifred Newton RN 02/19/2023 7:34 PM     Please review provider order for any additional goals.   Nurse to notify provider when observation goals have been met and patient is ready for discharge.

## 2023-02-20 NOTE — PROGRESS NOTES
[facility] Medicine PROGRESS NOTE      Identification/Summary:   Isamar Little is a 89 year old female who presented with complaints of increased weakness, lower extremity weakness, right leg swelling, recent fall.    Medical history is notable for anxiety and depression, permanent pacemaker implant, chronic diastolic congestive heart failure, coronary disease, essential hypertension, reflux, A-fib     initial evaluation negative for infection, CT head negative.  PT OT evaluated her.  She is very emotional at times. Niece would like to discharge her to TCU.  She is from assisted living.  Discharge pending placement.    Assessment and Plan:  Fatigue with inability to stand or ambulate independently  General weakness   Denies lower extremity pain  No focal deficits.  TSH, B12, folate normal  PT and OT eval recommending home with assistance  Niece would like her to discharge to TCU.     Chronic diastolic congestive heart failure with preserved ejection fraction, not in acute exacerbation  Pacemaker placement  BNP is mildly elevated  Continue home Lasix    Atrial fibrillation by history, not on rate controlling medications or anticoagulation  Coronary artery disease  Gastroesophageal reflux  Anxiety and depression  Continue other home medications.     Skin lesions  Seeing dermatology as outpatient.  Reports they are precancerous and is being treated.    Diet: Regular Diet Adult  DVT Prophylaxis:   Start subcu heparin  Code Status: No CPR- Do NOT Intubate    Anticipated possible discharge in 1 days once TCU placement  milestones are met.    Interval History/Subjective:  She is forgetful and intermittently confused according to OT.  No other shortness of breath, chest pain, urinary or bowel complaints.  Denies any pain.    Physical Exam/Objective:  Vitals I/O   Vital signs:  Temp: 97.7  F (36.5  C) Temp src: Oral BP: (!) 143/68 Pulse: 67   Resp: 16 SpO2: 94 % O2 Device: None (Room air)        Estimated body  "mass index is 27.67 kg/m  as calculated from the following:    Height as of 2/15/23: 1.499 m (4' 11\").    Weight as of 2/15/23: 62.1 kg (137 lb). I/O last 3 completed shifts:  In: -   Out: 120 [Urine:120]       General Appearance:  Alert, cooperative, no distress   Head:  Normocephalic, atraumatic   Eyes:  PERRL    Throat:  mucosa; moist   Neck: No JVD, thyromegaly   Lungs:   Clear to auscultation bilaterally, respirations unlabored   Chest Wall:  No tenderness or deformity   Heart:  Regular rate and rhythm, S1, S2 normal,no murmur   Abdomen:   Soft, non tender, non distended, bowel sounds present, no guarding or rigidity   Extremities:  no joint swelling trace edema in right lower extremity   Skin:  Hyperkeratotic lesions over the legs, on the face   Neurologic: Alert and oriented X 3, no focal deficits.  Moves all 4 extremities       Medications:   Personally Reviewed.    aspirin  81 mg Oral QPM     escitalopram  10 mg Oral Daily     furosemide  10 mg Oral Daily     heparin ANTICOAGULANT  5,000 Units Subcutaneous Q12H     pravastatin  60 mg Oral At Bedtime       Data reviewed today: I personally reviewed all new medications, labs, imaging/diagnostics reports over the past 24 hours. Pertinent findings include    Labs:  Most Recent 3 CBC's:  Recent Labs   Lab Test 02/18/23  1628 02/15/23  1800 12/09/19  0815   WBC 7.2 8.2 7.5   HGB 13.8 15.2 15.0   MCV 94 95 95    321 327     Most Recent 3 BMP's:  Recent Labs   Lab Test 02/18/23  1733 02/15/23  1800 04/06/22  1115    139 142   POTASSIUM 3.6 3.7 4.4   CHLORIDE 108* 104 104   CO2 21* 23 26   BUN 12 14.6 15   CR 0.62 0.62 0.76   ANIONGAP 13 12 12   CHANTAL 8.7 9.0 9.5    115* 89     Most Recent 2 LFT's:  Recent Labs   Lab Test 02/18/23  1733 04/06/22  1115   AST 31 24   ALT 20 18   ALKPHOS 86 80   BILITOTAL 0.8 0.6       Imaging:   No results found for this or any previous visit (from the past 24 hour(s)).     > 30 MINUTES SPENT BY ME on the date of " service doing chart review, history, exam, documentation & further activities per the note.    Pattie Talavera MD  Hospitalist  [facility]

## 2023-02-20 NOTE — UTILIZATION REVIEW
Concurrent stay review; Secondary Review Determination - [facility]        Under the authority of the Utilization Management Committee, the utilization review process indicated a secondary review on the above patient.  The review outcome is based on review of the medical records, discussions with staff, and applying clinical experience noted on the date of the review.        (x) Observation/outpatient Status Appropriate - Concurrent stay review       RATIONALE FOR DETERMINATION:   89-year-old female brought to the ED by ambulance on 2/18/2023 for increased weakness and 4 days of worsening right leg swelling.  Past medical history of essential hypertension, atherosclerotic heart disease, paroxysmal atrial fibrillation, sick sinus syndrome, pacemaker in situ, heart failure with preserved ejection fraction, chronic edema, venous insufficiency, hyperlipidemia, anxiety, adjustment disorder, insomnia, skin cancer, osteoarthritis, osteoporosis, GERD.  Laboratory work-up unremarkable.  Right lower extremity venous ultrasound showed no DVT.  CT head showed no acute intracranial abnormality.  Home medications were resumed, patient is supposed to wear wraps for edema but has not been able to do so.  Patient will need placement.    Patient delayed discharge is related to disposition, there is no medical necessity for inpatient admission at the time of this review. If there is a change in patient status, please resend for review.    The information on this document is developed by the utilization review team in order for the business office to ensure compliance.  This only denotes the appropriateness of proper admission status and does not reflect the quality of care rendered.       The definitions of Inpatient Status and Observation Status used in making the determination above are those provided in the CMS Coverage Manual, Chapter 1 and Chapter 6, section 70.4.       Sincerely,    Flynn Graham,  MD

## 2023-02-20 NOTE — PLAN OF CARE
"PRIMARY DIAGNOSIS: GENERALIZED WEAKNESS    OUTPATIENT/OBSERVATION GOALS TO BE MET BEFORE DISCHARGE  1. Orthostatic performed: N/A    2. Tolerating PO medications: Yes    3. Return to near baseline physical activity: No Up with A1. Up to commode with toile ting due to weakness.    4. Cleared for discharge by consultants (if involved): No  Waiting for TCU placement    Discharge Planner Nurse   Safe discharge environment identified: No   Barriers to discharge: Yes finding placement       Entered by: Juliana Seals RN 02/20/2023 7:03 AM     Please review provider order for any additional goals.   Nurse to notify provider when observation goals have been met and patient is ready for discharge.    Patient was alert and oriented beginning of night. Disoriented later during night. Was tearful and said she felt \"lost.\" Was able to be consoled and redirected. Refused heparin, yelling \"don't touch me\" when trying to approach with medication.  "

## 2023-02-20 NOTE — PLAN OF CARE
"PRIMARY DIAGNOSIS: \"GENERIC\" NURSING  OUTPATIENT/OBSERVATION GOALS TO BE MET BEFORE DISCHARGE:  1. ADLs back to baseline: Yes    2. Activity and level of assistance: Up with standby assistance.    3. Pain status: Improved-controlled with oral pain medications.    4. Return to near baseline physical activity: Yes     Discharge Planner Nurse   Safe discharge environment identified: Yes  Barriers to discharge: Yes, awaiting a tcu placement       Entered by: Diana Larose RN 02/20/2023 3:52 PM     Please review provider order for any additional goals.   Nurse to notify provider when observation goals have been met and patient is ready for discharge.Goal Outcome Evaluation:      Plan of Care Reviewed With: patient    Overall Patient Progress: improvingOverall Patient Progress: improving  Pt alert but can be forgetful, vss on room air, was visited by a friend who calm her down, pt requires assist of one to ambulate with a gait belt and walker.  She is awaiting ransfer to tcu         "

## 2023-02-21 ENCOUNTER — APPOINTMENT (OUTPATIENT)
Dept: OCCUPATIONAL THERAPY | Facility: CLINIC | Age: 88
End: 2023-02-21
Payer: MEDICARE

## 2023-02-21 ENCOUNTER — APPOINTMENT (OUTPATIENT)
Dept: PHYSICAL THERAPY | Facility: CLINIC | Age: 88
End: 2023-02-21
Payer: MEDICARE

## 2023-02-21 VITALS
RESPIRATION RATE: 16 BRPM | HEART RATE: 74 BPM | DIASTOLIC BLOOD PRESSURE: 67 MMHG | OXYGEN SATURATION: 95 % | TEMPERATURE: 97.4 F | SYSTOLIC BLOOD PRESSURE: 126 MMHG

## 2023-02-21 PROCEDURE — 97535 SELF CARE MNGMENT TRAINING: CPT | Mod: GO

## 2023-02-21 PROCEDURE — 97116 GAIT TRAINING THERAPY: CPT | Mod: GP

## 2023-02-21 PROCEDURE — 96372 THER/PROPH/DIAG INJ SC/IM: CPT | Performed by: INTERNAL MEDICINE

## 2023-02-21 PROCEDURE — G0378 HOSPITAL OBSERVATION PER HR: HCPCS

## 2023-02-21 PROCEDURE — 250N000013 HC RX MED GY IP 250 OP 250 PS 637: Performed by: HOSPITALIST

## 2023-02-21 PROCEDURE — 99238 HOSP IP/OBS DSCHRG MGMT 30/<: CPT | Performed by: INTERNAL MEDICINE

## 2023-02-21 PROCEDURE — 250N000011 HC RX IP 250 OP 636: Performed by: INTERNAL MEDICINE

## 2023-02-21 RX ORDER — DOCUSATE SODIUM 100 MG/1
100 CAPSULE, LIQUID FILLED ORAL 2 TIMES DAILY PRN
COMMUNITY
Start: 2023-02-21

## 2023-02-21 RX ORDER — ACETAMINOPHEN 325 MG/1
650 TABLET ORAL EVERY 6 HOURS PRN
COMMUNITY
Start: 2023-02-21

## 2023-02-21 RX ADMIN — HEPARIN SODIUM 5000 UNITS: 5000 INJECTION, SOLUTION INTRAVENOUS; SUBCUTANEOUS at 16:17

## 2023-02-21 RX ADMIN — HEPARIN SODIUM 5000 UNITS: 5000 INJECTION, SOLUTION INTRAVENOUS; SUBCUTANEOUS at 04:03

## 2023-02-21 RX ADMIN — ESCITALOPRAM OXALATE 10 MG: 10 TABLET ORAL at 09:15

## 2023-02-21 RX ADMIN — FUROSEMIDE 10 MG: 20 TABLET ORAL at 09:15

## 2023-02-21 RX ADMIN — ACETAMINOPHEN 650 MG: 325 TABLET ORAL at 13:25

## 2023-02-21 ASSESSMENT — ACTIVITIES OF DAILY LIVING (ADL)
ADLS_ACUITY_SCORE: 51
ADLS_ACUITY_SCORE: 47
ADLS_ACUITY_SCORE: 47
ADLS_ACUITY_SCORE: 51
ADLS_ACUITY_SCORE: 47
ADLS_ACUITY_SCORE: 51

## 2023-02-21 NOTE — PROGRESS NOTES
Care Management Discharge Note    Discharge Date: 02/21/2023       Discharge Disposition:  ([facility]    Discharge Services: Transportation Services    Discharge DME: None    Discharge Transportation: agency    Private pay costs discussed: transportation costs    PAS Confirmation Code:  ([id number])    Patient/family educated on Medicare website which has current facility and service quality ratings:  yes    Education Provided on the Discharge Plan:  AVS per bedside RN    Persons Notified of Discharge Plans: patient and niece    Patient/Family in Agreement with the Plan: yes    Handoff Referral Completed: Yes    Additional Information:  Chart reviewed. CM following for discharge needs. Patient has been accepted at [facility] and agreeable to this placement. CM was asked to arrange transportation. CM arranged [facility] stretcher for 5 pm tonight (2/21/23). Angeles (erica) has been updated. Patient is aware of discharge today.       CM completed PAS- [id number]  [facility] has requested a call from the financial counselor. CM left message for financial counselor to call [facility] walker)    [facility] referral sent per protocol.       Marcie Pace RN          .

## 2023-02-21 NOTE — PROGRESS NOTES
Patient given a copy of discharge instructions. Also faxed to [facility]. Patient's belongings returned to her. Patient has been discharged.

## 2023-02-21 NOTE — PLAN OF CARE
Problem: Plan of Care - These are the overarching goals to be used throughout the patient stay.    Goal: Plan of Care Review  Description: The Plan of Care Review/Shift note should be completed every shift.  The Outcome Evaluation is a brief statement about your assessment that the patient is improving, declining, or no change.  This information will be displayed automatically on your shift note.  Outcome: Progressing     Problem: Fall Injury Risk  Goal: Absence of Fall and Fall-Related Injury  Outcome: Progressing  Intervention: Identify and Manage Contributors  Recent Flowsheet Documentation  Taken 2/21/2023 0800 by Joellen Mckeon RN  Medication Review/Management: medications reviewed  Intervention: Promote Injury-Free Environment  Recent Flowsheet Documentation  Taken 2/21/2023 0800 by Joellen Mckeon RN  Safety Promotion/Fall Prevention: safety round/check completed   Goal Outcome Evaluation:       Patient stated that she was comfortable most of the day. Given Tylenol at her request this afternoon with a pain of 6/10. Stated improved pain 3/10. Up with assist of one and walker.

## 2023-02-21 NOTE — PLAN OF CARE
"PRIMARY DIAGNOSIS: \"GENERIC\" NURSING  OUTPATIENT/OBSERVATION GOALS TO BE MET BEFORE DISCHARGE:  1. ADLs back to baseline: Yes    2. Activity and level of assistance: Up with standby assistance.    Pain status: Yes, but able to take oral pain medications.  3. Return to near baseline physical activity: Yes     Discharge Planner Nurse   Safe discharge environment identified: Yes  Barriers to discharge:  Awaiting placement for TCU        Entered by: Carli Greenwood RN 02/20/2023 10:08 PM     Please review provider order for any additional goals.   Nurse to notify provider when observation goals have been met and patient is ready for discharge.Goal Outcome Evaluation:    Pt alert and disoriented x place and time, forgetful. Cried during initial assessment. Required emotional support. VSS on room air,  Discharge plans- awaiting transfer to TCU.      "

## 2023-02-21 NOTE — DISCHARGE SUMMARY
[facility]    Discharge Summary  Hospitalist    Date of Admission:  2/18/2023  Date of Discharge:  2/21/2023  5:14 PM  Provider:  Meg Fernando DO    Discharge Diagnoses   1.  Weakness and falls    Other medical issues:  Past Medical History:   Diagnosis Date     Adjustment disorder with mixed anxiety and depressed mood      Anxiety      Anxiety     Previously on sertraline     Anxiety state, unspecified      Arthritis      Atherosclerotic heart disease of native coronary artery without angina pectoris 5/16/2017     Bereavement, uncomplicated      Cardiac pacemaker in situ 5/31/2017    [employer] DR MEEKS DOI: 5/17/17 Dr. David Patel      Chronic diastolic congestive heart failure (H) 6/28/2018     Chronic edema 7/13/2020     Closed fracture of tooth with routine healing 4/23/2019     Coronary artery disease      Coronary atherosclerosis of native coronary artery      Depressive disorder, not elsewhere classified      Edema      Essential hypertension with goal blood pressure less than 140/90      Essential hypertension, benign      Gastritis      Gastroesophageal reflux disease 7/13/2020     Hyperlipidemia      Hyperlipidemia with target LDL less than 70      Hypertension      Osteoporosis without current pathological fracture, unspecified osteoporosis type 7/13/2020     Other and unspecified hyperlipidemia      Other malaise and fatigue      Paroxysmal atrial fibrillation (H)      Primary insomnia 7/13/2020     Primary osteoarthritis 7/13/2020     Skin cancer      Skin cancer      SSS (sick sinus syndrome) (H)      Stented coronary artery     STENT X 2     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  Please see the admission history and physical for full details.    Hospital Course   Isamar Little was admitted on 2/18/2023.  The  following problems were addressed during her hospitalization:    1.  Weakness and falls        Right leg swelling    Ms. Little returns to ED with ongoing weakness and falls.  She was also c/o swelling of the right lower leg.  US with no DVT.  Head CT without acute intracranial hemorrhage.  She was assessed by PT/OT and TCU stay was recommended.  Lab work-up fairly unremarkable; TSH, folate and B12 within normal limits.      Significant Results and Procedures   none    Pending Results   Unresulted Labs Ordered in the Past 30 Days of this Admission     No orders found from 1/19/2023 to 2/19/2023.          Code Status   DNR / DNI       Primary Care Physician   Mikal Perez    Physical Exam   Temp: 97.3  F (36.3  C) Temp src: Oral BP: (!) 168/77 Pulse: 61   Resp: 16 SpO2: 90 % O2 Device: None (Room air)    There were no vitals filed for this visit.  Vital Signs with Ranges  Temp:  [97.3  F (36.3  C)-97.8  F (36.6  C)] 97.3  F (36.3  C)  Pulse:  [61-87] 61  Resp:  [16] 16  BP: (132-168)/(63-95) 168/77  SpO2:  [90 %-96 %] 90 %  I/O last 3 completed shifts:  In: 240 [P.O.:240]  Out: -     PT SEEN AND EXAMINED ON DAY OF D/C  GEN:  Alert, awake, in no acute respiratory distress  HEENT:  Normocephalic/atraumatic, PERRL, no scleral icterus, no nasal discharge, mouth moist  CV:  Somewhat distant heart sounds, irregular rate and rhythm, no murmur to ausc.  S1 + S2 noted, no S3 or S4.  LUNGS:  Clear to auscultation ant/lat bilaterally.  No rales/rhonchi/wheezing auscultated bilaterally.  No costal retractions bilaterally.  Symmetric chest rise on inhalation noted.  EXT:  Trace pretibial edema bilaterally, no cyanosis bilaterally.  SKIN: dry lesions noted to both upper checks and across the bridge of her nose - states that these are unchanged for a long time.    Dry to touch, no  jaundice noted.  PSYCH:  Interactive, close to near-tears at times during my assessment.  NEURO:  No tremors at rest, speech clear and appropriate.      Discharge Disposition   Discharged to rehabilitation facility    Consultations This Hospital Stay   CARE MANAGEMENT / SOCIAL WORK IP CONSULT  PHYSICAL THERAPY ADULT IP CONSULT  CARE MANAGEMENT / SOCIAL WORK IP CONSULT  OCCUPATIONAL THERAPY ADULT IP CONSULT  PHYSICAL THERAPY ADULT IP CONSULT  OCCUPATIONAL THERAPY ADULT IP CONSULT    Time Spent on this Encounter   Meg WOODY DO, personally saw the patient today and spent less than or equal to 30 minutes discharging this patient.    Discharge Orders      General info for SNF    Length of Stay Estimate: Short Term Care: Estimated # of Days <30  Condition at Discharge: Stable  Level of care:skilled   Rehabilitation Potential: Good  Admission H&P remains valid and up-to-date: Yes  Recent Chemotherapy: N/A  Use Nursing Home Standing Orders: Yes     Mantoux instructions    Give two-step Mantoux (PPD) Per Facility Policy Yes     Follow Up and recommended labs and tests    Follow up with Nursing home physician within 5-7 days for assessment. No follow up labs or test are needed.     Reason for your hospital stay    You were admitted with weakness and fall at home     Activity - Up with nursing assistance     No CPR- Do NOT Intubate     Physical Therapy Adult Consult    Evaluate and treat as clinically indicated.    Reason:  falls     Occupational Therapy Adult Consult    Evaluate and treat as clinically indicated.    Reason: falls     Fall precautions     Diet    Follow this diet upon discharge:       Regular Diet Adult     Discharge Medications   Current Discharge Medication List      START taking these medications    Details   acetaminophen (TYLENOL) 325 MG tablet Take 2 tablets (650 mg) by mouth every 6 hours as needed for mild pain or other (and adjunct with moderate or severe pain or per patient request)    Associated Diagnoses: Falls frequently      docusate sodium (COLACE) 100 MG capsule Take 1 capsule (100 mg) by mouth 2 times daily as needed for  constipation    Associated Diagnoses: Advanced age         CONTINUE these medications which have NOT CHANGED    Details   aspirin (ASA) 81 MG EC tablet Take 81 mg by mouth every evening      escitalopram (LEXAPRO) 10 MG tablet Take 10 mg by mouth daily      furosemide (LASIX) 20 MG tablet Take 10 mg by mouth daily      melatonin 3 MG tablet Take 3 mg by mouth nightly as needed      nitroGLYcerin (NITROSTAT) 0.4 MG sublingual tablet Place 0.4 mg under the tongue every 5 minutes as needed      pravastatin (PRAVACHOL) 40 MG tablet Take 1.5 tablets (60 mg) by mouth At Bedtime  Qty: 90 tablet, Refills: 1    Comments: Dose increase  Associated Diagnoses: Hyperlipidemia with target LDL less than 70           Allergies   Allergies   Allergen Reactions     Cephalexin Rash     Data   Recent Labs   Lab 02/18/23  1628   WBC 7.2   HGB 13.8   HCT 41.0   MCV 94        Recent Labs   Lab 02/18/23  1733      POTASSIUM 3.6   CHLORIDE 108*   CO2 21*   ANIONGAP 13      BUN 12   CR 0.62   GFRESTIMATED 82   CHANTAL 8.7     Recent Labs   Lab 02/18/23  1733      POTASSIUM 3.6   CHLORIDE 108*   CO2 21*   ANIONGAP 13      BUN 12   CR 0.62   GFRESTIMATED 82   CHANTAL 8.7   MAG 2.0   PROTTOTAL 6.6   ALBUMIN 3.5   BILITOTAL 0.8   ALKPHOS 86   AST 31   ALT 20     Results for orders placed or performed during the hospital encounter of 02/18/23   US Lower Extremity Venous Duplex Right    Narrative    EXAM: US LOWER EXTREMITY VENOUS DUPLEX RIGHT  LOCATION: [facility]  DATE/TIME: 2/18/2023 5:09 PM    INDICATION: Right leg swelling and edema.  COMPARISON: None.  TECHNIQUE: Venous Duplex ultrasound of the right lower extremity with and without compression, augmentation and duplex. Color flow and spectral Doppler with waveform analysis performed.    FINDINGS: Exam includes the common femoral, femoral, popliteal, and contralateral common femoral veins as well as segmentally visualized deep calf veins and  greater saphenous vein.     RIGHT: No deep vein thrombosis. No superficial thrombophlebitis. No popliteal cyst.      Impression    IMPRESSION:  1.  No deep venous thrombosis in the right lower extremity.   Head CT w/o contrast    Narrative    EXAM: CT HEAD W/O CONTRAST  LOCATION: [facility]  DATE/TIME: 2/18/2023 5:15 PM    INDICATION: Weakness, fall earlier this month, negative CT head, escalated symptoms, rule out subdural.  COMPARISON: None.  TECHNIQUE: Routine CT Head without IV contrast. Multiplanar reformats. Dose reduction techniques were used.    FINDINGS:  No evidence of intracranial hemorrhage. Volume loss and white matter hypoattenuation likely represents chronic small vessel ischemic change. No acute osseous abnormality. Right parietal scalp contusion.      Impression    IMPRESSION:  1.  No acute intracranial abnormality.

## 2023-02-21 NOTE — PLAN OF CARE
PRIMARY DIAGNOSIS: ACUTE PAIN  OUTPATIENT/OBSERVATION GOALS TO BE MET BEFORE DISCHARGE:  1. Pain Status: Improved-controlled with oral pain medications.    2. Return to near baseline physical activity: Yes    3. Cleared for discharge by consultants (if involved): Yes    Discharge Planner Nurse   Safe discharge environment identified:  Awaiting TCU placement   Barriers to discharge:  Awaiting for TCU placement        Entered by: Carli Greenwood RN 02/21/2023 1:29 AM     Please review provider order for any additional goals.   Nurse to notify provider when observation goals have been met and patient is ready for discharge.Goal Outcome Evaluation:    Pt is alert and disoriented x place/ time, forgetful. No crying observed since initial assessment.   VSS on room air. Rate mild level of pain. Stated that Tylenol was effective.Interventions such as rest and repositioning was also utilized. Pt refused other interventions such as therapy oils, ice packs, or nourishment.   Pt stated that she takes melatonin every night, but was able to sleep without the medication tonight. Pt was like the medication available in the future. Will follow up.      Discharge plans- awaiting transfer to TCU.

## 2023-02-22 NOTE — PLAN OF CARE
Occupational Therapy Discharge Summary    Reason for therapy discharge:    Discharged to transitional care facility.    Progress towards therapy goal(s). See goals on Care Plan in [facility] electronic health record for goal details.  Goals not met.  Barriers to achieving goals:   discharge from facility.    Therapy recommendation(s):    Continued therapy is recommended.  Rationale/Recommendations:  to increase IND with ADLs.

## 2023-02-22 NOTE — PROGRESS NOTES
Physical Therapy Discharge Summary    Reason for therapy discharge:    Discharged to transitional care facility.    Progress towards therapy goal(s). See goals on Care Plan in [facility] electronic health record for goal details.  Goals not met.  Barriers to achieving goals:   limited tolerance for therapy and discharge from facility.    Therapy recommendation(s):    Continued therapy is recommended.  Rationale/Recommendations:  TCU.

## 2023-02-24 ENCOUNTER — TRANSITIONAL CARE UNIT VISIT (OUTPATIENT)
Dept: GERIATRICS | Facility: CLINIC | Age: 88
End: 2023-02-24
Payer: MEDICARE

## 2023-02-24 VITALS
RESPIRATION RATE: 16 BRPM | HEIGHT: 59 IN | SYSTOLIC BLOOD PRESSURE: 159 MMHG | BODY MASS INDEX: 26.93 KG/M2 | OXYGEN SATURATION: 93 % | HEART RATE: 75 BPM | TEMPERATURE: 98.3 F | DIASTOLIC BLOOD PRESSURE: 85 MMHG | WEIGHT: 133.6 LBS

## 2023-02-24 DIAGNOSIS — R53.81 PHYSICAL DECONDITIONING: Primary | ICD-10-CM

## 2023-02-24 DIAGNOSIS — C44.90 SKIN CANCER: ICD-10-CM

## 2023-02-24 DIAGNOSIS — I50.32 CHRONIC DIASTOLIC CONGESTIVE HEART FAILURE (H): ICD-10-CM

## 2023-02-24 DIAGNOSIS — I48.0 PAROXYSMAL ATRIAL FIBRILLATION (H): ICD-10-CM

## 2023-02-24 PROCEDURE — 99309 SBSQ NF CARE MODERATE MDM 30: CPT | Performed by: NURSE PRACTITIONER

## 2023-02-24 NOTE — PROGRESS NOTES
[facility] GERIATRICS    PRIMARY CARE PROVIDER AND CLINIC:  Mikal Perez MD, [facility] [address]   Chief Complaint   Patient presents with     Hospital F/U      [place] Medical Record Number:  [id number]  Place of Service where encounter took place:  [facility] (TCU) [[id number]]    Isamar Little  is a 89 year old  (5/3/1928), admitted to the above facility from  [facility]. Hospital stay 2/18/23 through 2/21/23. Patient presented with progressive weakness and recurrent falls. No acute illnesses found. TCU recommended.    HPI obtained from patient visit, niece present in room and other niece (POA) on phone, review of nursing home record, discussion with facility staff, and Epic review.     HPI:    Family requesting update on what the plan is for patient. Provider advises them on a typical TCU course. They are hopeful that she will be able to return home, this is in a senior building, but is independent living. Her campus does not have residential. Provider educates them that she has had progressive decline related to chronic illness and age. It is possible that she will improve with PT/OT, but it is also possible that she simply needs a higher level of care. They have noted that acetaminophen seems to help with her general aches and pains. After discussion, plan will be to administer 650mg with breakfast and lunch. She recently had skin cancer lesions removed from her face. She is supposed to keep the areas constantly moist with vaseline.       CODE STATUS/ADVANCE DIRECTIVES DISCUSSION:  No CPR- Do NOT Intubate   ALLERGIES:   Allergies   Allergen Reactions     Cephalexin Rash      PAST MEDICAL HISTORY:   Past Medical History:   Diagnosis Date     Adjustment disorder with mixed anxiety and depressed mood      Anxiety      Anxiety     Previously on sertraline     Anxiety state, unspecified      Arthritis      Atherosclerotic heart disease of native  coronary artery without angina pectoris 5/16/2017     Bereavement, uncomplicated      Cardiac pacemaker in situ 5/31/2017    [employer] MEEKS DOI: 5/17/17 Dr. David Patel      Chronic diastolic congestive heart failure (H) 6/28/2018     Chronic edema 7/13/2020     Closed fracture of tooth with routine healing 4/23/2019     Coronary artery disease      Coronary atherosclerosis of native coronary artery      Depressive disorder, not elsewhere classified      Edema      Essential hypertension with goal blood pressure less than 140/90      Essential hypertension, benign      Gastritis      Gastroesophageal reflux disease 7/13/2020     Hyperlipidemia      Hyperlipidemia with target LDL less than 70      Hypertension      Osteoporosis without current pathological fracture, unspecified osteoporosis type 7/13/2020     Other and unspecified hyperlipidemia      Other malaise and fatigue      Paroxysmal atrial fibrillation (H)      Primary insomnia 7/13/2020     Primary osteoarthritis 7/13/2020     Skin cancer      Skin cancer      SSS (sick sinus syndrome) (H)      Stented coronary artery     STENT X 2      PAST SURGICAL HISTORY:   has a past surgical history that includes Cardiac surgery (06/11/2008); ENT surgery; Eye surgery (Bilateral); Eye surgery (Left); GYN surgery; Mohs micrographic procedure (Left, 8/19/2014); IR Atherectomy (6/11/2008); Dilation and curettage; tonsillectomy & adenoidectomy; Angioplasty (06/2008); Cataract Extraction, Bilateral (1/2010); Capsulotomy (Left, 1/2014); and Implant pacemaker.  FAMILY HISTORY: family history includes Cerebrovascular Disease in her father; Coronary Artery Disease in her brother and sister; Dementia in her brother; Early Death in her maternal grandfather and paternal grandfather; Heart Disease in her maternal grandfather and paternal grandfather; Hypertension in her father and mother; Kidney Disease in her maternal grandfather, maternal grandmother, paternal  "grandfather, and paternal grandmother.  SOCIAL HISTORY:   reports that she has never smoked. She has never used smokeless tobacco. She reports that she does not drink alcohol and does not use drugs.  Patient's living condition: lives alone    Post Discharge Medication Reconciliation Status:   MED REC REQUIRED  Post Medication Reconciliation Status:  Discharge medications reconciled, continue medications without change         Current Outpatient Medications   Medication Sig     acetaminophen (TYLENOL) 325 MG tablet Take 2 tablets (650 mg) by mouth every 6 hours as needed for mild pain or other (and adjunct with moderate or severe pain or per patient request)     aspirin (ASA) 81 MG EC tablet Take 81 mg by mouth every evening     docusate sodium (COLACE) 100 MG capsule Take 1 capsule (100 mg) by mouth 2 times daily as needed for constipation     escitalopram (LEXAPRO) 10 MG tablet Take 10 mg by mouth daily     furosemide (LASIX) 20 MG tablet Take 10 mg by mouth daily     melatonin 3 MG tablet Take 3 mg by mouth nightly as needed     nitroGLYcerin (NITROSTAT) 0.4 MG sublingual tablet Place 0.4 mg under the tongue every 5 minutes as needed     pravastatin (PRAVACHOL) 40 MG tablet Take 1.5 tablets (60 mg) by mouth At Bedtime     No current facility-administered medications for this visit.       ROS:  10 point ROS of systems including Constitutional, Eyes, Respiratory, Cardiovascular, Gastroenterology, Genitourinary, Integumentary, Musculoskeletal, Psychiatric were all negative except for pertinent positives noted in my HPI.    Vitals:  BP (!) 159/85   Pulse 75   Temp 98.3  F (36.8  C)   Resp 16   Ht 1.499 m (4' 11\")   Wt 60.6 kg (133 lb 9.6 oz)   SpO2 93%   BMI 26.98 kg/m    Exam:  GENERAL APPEARANCE:  Alert, in no distress  ENT:  Mouth and posterior oropharynx normal, moist mucous membranes, [place]  EYES:  EOM normal, conjunctiva and lids normal  RESP:  respiratory effort and palpation of chest normal, lungs clear " to auscultation , no respiratory distress  CV:  no edema  SKIN:  several scabbed areas over nose, cheeks  PSYCH:  oriented X 3, affect and mood normal    Lab/Diagnostic data:  Recent labs in [facility] reviewed by me today.     ASSESSMENT/PLAN:  (R53.81) Physical deconditioning  (primary encounter diagnosis)  Comment: Chronic, progressive decline  Plan: PT/OT eval and treat, discharge planning per their recommendations.    (I48.0) Paroxysmal atrial fibrillation (H)  Comment: HR controlled. No AC due to risks  Plan: Continue current POC with no changes at this time and adjustments as needed.    (I50.32) Chronic diastolic congestive heart failure (H)  Comment: Chronic. Currently: compensated.  Plan: Continue current medications. Monitor for shortness of breath, wheezing, increasing lower extremity edema and change in activity tolerance.     (C44.90) Skin cancer  Comment: Wounds appear to be healing appropriately. Order written to apply vaseline QID      Electronically signed by:  WILLIS Grove CNP

## 2023-02-24 NOTE — LETTER
2/24/2023        RE: Isamar Little  [address]  [address]  [facility] [address]        [facility] GERIATRICS    PRIMARY CARE PROVIDER AND CLINIC:  Mikal Perez MD, [facility] [address]   Chief Complaint   Patient presents with     Hospital F/U      [facility] Record Number:  [id number]  Place of Service where encounter took place:  [facility] (TCU) [[id number]]    Isamar Little  is a 89 year old  (5/3/1928), admitted to the above facility from  [facility]. Hospital stay 2/18/23 through 2/21/23. Patient presented with progressive weakness and recurrent falls. No acute illnesses found. TCU recommended.    HPI obtained from patient visit, niece present in room and other niece (POA) on phone, review of nursing home record, discussion with facility staff, and Epic review.     HPI:    Family requesting update on what the plan is for patient. Provider advises them on a typical TCU course. They are hopeful that she will be able to return home, this is in a senior building, but is independent living. Her campus does not have long-term. Provider educates them that she has had progressive decline related to chronic illness and age. It is possible that she will improve with PT/OT, but it is also possible that she simply needs a higher level of care. They have noted that acetaminophen seems to help with her general aches and pains. After discussion, plan will be to administer 650mg with breakfast and lunch. She recently had skin cancer lesions removed from her face. She is supposed to keep the areas constantly moist with vaseline.       CODE STATUS/ADVANCE DIRECTIVES DISCUSSION:  No CPR- Do NOT Intubate   ALLERGIES:   Allergies   Allergen Reactions     Cephalexin Rash      PAST MEDICAL HISTORY:   Past Medical History:   Diagnosis Date     Adjustment disorder with mixed anxiety and depressed mood      Anxiety      Anxiety     Previously on  sertraline     Anxiety state, unspecified      Arthritis      Atherosclerotic heart disease of native coronary artery without angina pectoris 5/16/2017     Bereavement, uncomplicated      Cardiac pacemaker in situ 5/31/2017    [employer] Jaspal MEEKS DOI: 5/17/17 Dr. David Patel      Chronic diastolic congestive heart failure (H) 6/28/2018     Chronic edema 7/13/2020     Closed fracture of tooth with routine healing 4/23/2019     Coronary artery disease      Coronary atherosclerosis of native coronary artery      Depressive disorder, not elsewhere classified      Edema      Essential hypertension with goal blood pressure less than 140/90      Essential hypertension, benign      Gastritis      Gastroesophageal reflux disease 7/13/2020     Hyperlipidemia      Hyperlipidemia with target LDL less than 70      Hypertension      Osteoporosis without current pathological fracture, unspecified osteoporosis type 7/13/2020     Other and unspecified hyperlipidemia      Other malaise and fatigue      Paroxysmal atrial fibrillation (H)      Primary insomnia 7/13/2020     Primary osteoarthritis 7/13/2020     Skin cancer      Skin cancer      SSS (sick sinus syndrome) (H)      Stented coronary artery     STENT X 2      PAST SURGICAL HISTORY:   has a past surgical history that includes Cardiac surgery (06/11/2008); ENT surgery; Eye surgery (Bilateral); Eye surgery (Left); GYN surgery; Mohs micrographic procedure (Left, 8/19/2014); IR Atherectomy (6/11/2008); Dilation and curettage; tonsillectomy & adenoidectomy; Angioplasty (06/2008); Cataract Extraction, Bilateral (1/2010); Capsulotomy (Left, 1/2014); and Implant pacemaker.  FAMILY HISTORY: family history includes Cerebrovascular Disease in her father; Coronary Artery Disease in her brother and sister; Dementia in her brother; Early Death in her maternal grandfather and paternal grandfather; Heart Disease in her maternal grandfather and paternal grandfather; Hypertension in  "her father and mother; Kidney Disease in her maternal grandfather, maternal grandmother, paternal grandfather, and paternal grandmother.  SOCIAL HISTORY:   reports that she has never smoked. She has never used smokeless tobacco. She reports that she does not drink alcohol and does not use drugs.  Patient's living condition: lives alone    Post Discharge Medication Reconciliation Status:   MED REC REQUIRED  Post Medication Reconciliation Status:  Discharge medications reconciled, continue medications without change         Current Outpatient Medications   Medication Sig     acetaminophen (TYLENOL) 325 MG tablet Take 2 tablets (650 mg) by mouth every 6 hours as needed for mild pain or other (and adjunct with moderate or severe pain or per patient request)     aspirin (ASA) 81 MG EC tablet Take 81 mg by mouth every evening     docusate sodium (COLACE) 100 MG capsule Take 1 capsule (100 mg) by mouth 2 times daily as needed for constipation     escitalopram (LEXAPRO) 10 MG tablet Take 10 mg by mouth daily     furosemide (LASIX) 20 MG tablet Take 10 mg by mouth daily     melatonin 3 MG tablet Take 3 mg by mouth nightly as needed     nitroGLYcerin (NITROSTAT) 0.4 MG sublingual tablet Place 0.4 mg under the tongue every 5 minutes as needed     pravastatin (PRAVACHOL) 40 MG tablet Take 1.5 tablets (60 mg) by mouth At Bedtime     No current facility-administered medications for this visit.       ROS:  10 point ROS of systems including Constitutional, Eyes, Respiratory, Cardiovascular, Gastroenterology, Genitourinary, Integumentary, Musculoskeletal, Psychiatric were all negative except for pertinent positives noted in my HPI.    Vitals:  BP (!) 159/85   Pulse 75   Temp 98.3  F (36.8  C)   Resp 16   Ht 1.499 m (4' 11\")   Wt 60.6 kg (133 lb 9.6 oz)   SpO2 93%   BMI 26.98 kg/m    Exam:  GENERAL APPEARANCE:  Alert, in no distress  ENT:  Mouth and posterior oropharynx normal, moist mucous membranes, [place]  EYES:  EOM normal, " conjunctiva and lids normal  RESP:  respiratory effort and palpation of chest normal, lungs clear to auscultation , no respiratory distress  CV:  no edema  SKIN:  several scabbed areas over nose, cheeks  PSYCH:  oriented X 3, affect and mood normal    Lab/Diagnostic data:  Recent labs in [facility] reviewed by me today.     ASSESSMENT/PLAN:  (R53.81) Physical deconditioning  (primary encounter diagnosis)  Comment: Chronic, progressive decline  Plan: PT/OT eval and treat, discharge planning per their recommendations.    (I48.0) Paroxysmal atrial fibrillation (H)  Comment: HR controlled. No AC due to risks  Plan: Continue current POC with no changes at this time and adjustments as needed.    (I50.32) Chronic diastolic congestive heart failure (H)  Comment: Chronic. Currently: compensated.  Plan: Continue current medications. Monitor for shortness of breath, wheezing, increasing lower extremity edema and change in activity tolerance.     (C44.90) Skin cancer  Comment: Wounds appear to be healing appropriately. Order written to apply vaseline QID      Electronically signed by:  WILLIS Grove CNP                       Sincerely,        WILLIS Grove CNP

## 2023-02-28 ENCOUNTER — TELEPHONE (OUTPATIENT)
Dept: GERIATRICS | Facility: CLINIC | Age: 88
End: 2023-02-28
Payer: MEDICARE

## 2023-02-28 NOTE — TELEPHONE ENCOUNTER
[facility] Nurse Triage Telephone Note    Provider: WILLIS Mora CNP   Facility: [facility]   Facility Type:  TCU    Caller: [place]  Call Back Number: [phone]    Allergies   Allergen Reactions     Cephalexin Rash       Reason for call: Pt has skin tag on RLE that was irritated with friction from sock and began bleeding today. Nurse cleansed and applied a bandaid.    Verbal Order/Direction given by Provider:   - Continue wound care protocol and monitor for s/s of infection    Provider giving Order:  WILLIS Mora CNP     Verbal Order given to: Conner Landa RN

## 2023-03-01 ENCOUNTER — TRANSITIONAL CARE UNIT VISIT (OUTPATIENT)
Dept: GERIATRICS | Facility: CLINIC | Age: 88
End: 2023-03-01
Payer: MEDICARE

## 2023-03-01 VITALS
RESPIRATION RATE: 18 BRPM | WEIGHT: 131.4 LBS | HEART RATE: 73 BPM | SYSTOLIC BLOOD PRESSURE: 118 MMHG | TEMPERATURE: 97.7 F | DIASTOLIC BLOOD PRESSURE: 62 MMHG | OXYGEN SATURATION: 93 % | BODY MASS INDEX: 26.49 KG/M2 | HEIGHT: 59 IN

## 2023-03-01 DIAGNOSIS — I50.32 CHRONIC DIASTOLIC CONGESTIVE HEART FAILURE (H): ICD-10-CM

## 2023-03-01 DIAGNOSIS — R53.81 PHYSICAL DECONDITIONING: Primary | ICD-10-CM

## 2023-03-01 DIAGNOSIS — I48.0 PAF (PAROXYSMAL ATRIAL FIBRILLATION) (H): ICD-10-CM

## 2023-03-01 DIAGNOSIS — G47.00 INSOMNIA, UNSPECIFIED TYPE: ICD-10-CM

## 2023-03-01 PROCEDURE — 99309 SBSQ NF CARE MODERATE MDM 30: CPT | Performed by: NURSE PRACTITIONER

## 2023-03-01 NOTE — LETTER
"    3/1/2023        RE: Isamar Little  [address]  [address]  [facility] [address]        [facility] GERIATRICS    Chief Complaint   Patient presents with     RECHECK     HPI:  Isamar Little is a 89 year old  (5/3/1928), who is being seen today for an episodic care visit at: [facility] (U) [[id number]]. [facility]. Hospital stay 2/18/23 through 2/21/23. Patient presented with progressive weakness and recurrent falls. No acute illnesses found. TCU recommended.    Today's concern is:   Patient is tearful, says that things are not going well. She does not mention anything specific. She does say that she has trouble sleeping, but now that they are giving the melatonin, this is better. Staff report that she is frequently trearful and sad. They are going to have psychology visit her. Per therapy she is walking 200 feet with a walker, transfers with min to mod assist.    Allergies, and PMH/PSH reviewed in EPIC today.  REVIEW OF SYSTEMS:  4 point ROS including Respiratory, CV, GI and , other than that noted in the HPI,  is negative    Objective:   /62   Pulse 73   Temp 97.7  F (36.5  C)   Resp 18   Ht 1.499 m (4' 11\")   Wt 59.6 kg (131 lb 6.4 oz)   SpO2 93%   BMI 26.54 kg/m    GENERAL APPEARANCE:  Alert, in no distress  RESP:  no respiratory distress  PSYCH:  oriented X 3, sad      Assessment/Plan:  (R53.81) Physical deconditioning  (primary encounter diagnosis)  Comment: Improving  Plan: PT/OT eval and treat, discharge planning per their recommendations.    (I48.0) PAF (paroxysmal atrial fibrillation) (H)  Comment: HR controlled. No AC due to risk of falls, bleeding  Plan: Continue current POC with no changes at this time and adjustments as needed.    (I50.32) Chronic diastolic congestive heart failure (H)  Comment: Chronic. Currently: compensated.  Plan: Continue current medications. Monitor for shortness of breath, wheezing, increasing lower extremity " edema and change in activity tolerance.     (G47.00) Insomnia, unspecified type  Comment: Staff requesting to have melatonin scheduled, will change this      Electronically signed by: WILLIS Grove CNP             Sincerely,        WILLIS Grove CNP

## 2023-03-01 NOTE — PROGRESS NOTES
"[facility] GERIATRICS    Chief Complaint   Patient presents with     RECHECK     HPI:  Isamar Little is a 89 year old  (5/3/1928), who is being seen today for an episodic care visit at: [facility] (U) [[id number]]. [facility]. Hospital stay 2/18/23 through 2/21/23. Patient presented with progressive weakness and recurrent falls. No acute illnesses found. TCU recommended.    Today's concern is:   Patient is tearful, says that things are not going well. She does not mention anything specific. She does say that she has trouble sleeping, but now that they are giving the melatonin, this is better. Staff report that she is frequently trearful and sad. They are going to have psychology visit her. Per therapy she is walking 200 feet with a walker, transfers with min to mod assist.    Allergies, and PMH/PSH reviewed in EPIC today.  REVIEW OF SYSTEMS:  4 point ROS including Respiratory, CV, GI and , other than that noted in the HPI,  is negative    Objective:   /62   Pulse 73   Temp 97.7  F (36.5  C)   Resp 18   Ht 1.499 m (4' 11\")   Wt 59.6 kg (131 lb 6.4 oz)   SpO2 93%   BMI 26.54 kg/m    GENERAL APPEARANCE:  Alert, in no distress  RESP:  no respiratory distress  PSYCH:  oriented X 3, sad      Assessment/Plan:  (R53.81) Physical deconditioning  (primary encounter diagnosis)  Comment: Improving  Plan: PT/OT eval and treat, discharge planning per their recommendations.    (I48.0) PAF (paroxysmal atrial fibrillation) (H)  Comment: HR controlled. No AC due to risk of falls, bleeding  Plan: Continue current POC with no changes at this time and adjustments as needed.    (I50.32) Chronic diastolic congestive heart failure (H)  Comment: Chronic. Currently: compensated.  Plan: Continue current medications. Monitor for shortness of breath, wheezing, increasing lower extremity edema and change in activity tolerance.     (G47.00) Insomnia, unspecified type  Comment: Staff " requesting to have melatonin scheduled, will change this      Electronically signed by: WILLIS Grove CNP

## 2023-03-03 RX ORDER — LANOLIN ALCOHOL/MO/W.PET/CERES
3 CREAM (GRAM) TOPICAL AT BEDTIME
Start: 2023-03-03

## 2023-03-06 DIAGNOSIS — E78.5 HYPERLIPIDEMIA WITH TARGET LDL LESS THAN 70: ICD-10-CM

## 2023-03-06 RX ORDER — PRAVASTATIN SODIUM 40 MG
TABLET ORAL
Qty: 90 TABLET | Refills: 0 | Status: SHIPPED | OUTPATIENT
Start: 2023-03-06

## 2023-03-08 ENCOUNTER — TRANSITIONAL CARE UNIT VISIT (OUTPATIENT)
Dept: GERIATRICS | Facility: CLINIC | Age: 88
End: 2023-03-08
Payer: MEDICARE

## 2023-03-08 VITALS
WEIGHT: 142.8 LBS | RESPIRATION RATE: 16 BRPM | BODY MASS INDEX: 28.79 KG/M2 | DIASTOLIC BLOOD PRESSURE: 76 MMHG | HEIGHT: 59 IN | TEMPERATURE: 97.3 F | HEART RATE: 71 BPM | OXYGEN SATURATION: 96 % | SYSTOLIC BLOOD PRESSURE: 145 MMHG

## 2023-03-08 DIAGNOSIS — R53.81 PHYSICAL DECONDITIONING: Primary | ICD-10-CM

## 2023-03-08 DIAGNOSIS — R41.89 COGNITIVE IMPAIRMENT: ICD-10-CM

## 2023-03-08 PROCEDURE — 99308 SBSQ NF CARE LOW MDM 20: CPT | Performed by: NURSE PRACTITIONER

## 2023-03-08 NOTE — PROGRESS NOTES
"[facility] GERIATRICS    Chief Complaint   Patient presents with     RECHECK     HPI:  Isamar Little is a 89 year old  (5/3/1928), who is being seen today for an episodic care visit at: [facility] (U) [[id number]]. [facility]. Hospital stay 2/18/23 through 2/21/23. Patient presented with progressive weakness and recurrent falls. No acute illnesses found. TCU recommended.    Today's concern is:   Per therapy patient is walking 200 feet with walker. Last day of therapy will be 3/15/23. Her initial SLUMS score was 9/30. She seemed improved, so they did retest, now 18/30. She has involved family that check on her daily    Allergies, and PMH/PSH reviewed in EPIC today.  REVIEW OF SYSTEMS:  4 point ROS including Respiratory, CV, GI and , other than that noted in the HPI,  is negative    Objective:   BP (!) 145/76   Pulse 71   Temp 97.3  F (36.3  C)   Resp 16   Ht 1.499 m (4' 11\")   Wt 64.8 kg (142 lb 12.8 oz)   SpO2 96%   BMI 28.84 kg/m    GENERAL APPEARANCE:  Alert, in no distress  RESP:  no respiratory distress  PSYCH:  insight and judgement impaired, memory impaired     Recent labs in EPIC reviewed by me today.     Assessment/Plan:  (R53.81) Physical deconditioning  (primary encounter diagnosis)  Comment: Improving  Plan: PT/OT eval and treat, discharge planning per their recommendations.    (R41.89) Cognitive impairment  Comment: Mild to moderate impairment. Likely safe with daily checks.      Electronically signed by: WILLIS Grove CNP     "

## 2023-03-08 NOTE — LETTER
"    3/8/2023        RE: Isamar Little  [address]  [address]  [facility] [address]        [facility] GERIATRICS    Chief Complaint   Patient presents with     RECHECK     HPI:  Isamar Little is a 89 year old  (5/3/1928), who is being seen today for an episodic care visit at: [facility] (U) [[id number]]. [facility]. Hospital stay 2/18/23 through 2/21/23. Patient presented with progressive weakness and recurrent falls. No acute illnesses found. TCU recommended.    Today's concern is:   Per therapy patient is walking 200 feet with walker. Last day of therapy will be 3/15/23. Her initial SLUMS score was 9/30. She seemed improved, so they did retest, now 18/30. She has involved family that check on her daily    Allergies, and PMH/PSH reviewed in EPIC today.  REVIEW OF SYSTEMS:  4 point ROS including Respiratory, CV, GI and , other than that noted in the HPI,  is negative    Objective:   BP (!) 145/76   Pulse 71   Temp 97.3  F (36.3  C)   Resp 16   Ht 1.499 m (4' 11\")   Wt 64.8 kg (142 lb 12.8 oz)   SpO2 96%   BMI 28.84 kg/m    GENERAL APPEARANCE:  Alert, in no distress  RESP:  no respiratory distress  PSYCH:  insight and judgement impaired, memory impaired     Recent labs in EPIC reviewed by me today.     Assessment/Plan:  (R53.81) Physical deconditioning  (primary encounter diagnosis)  Comment: Improving  Plan: PT/OT eval and treat, discharge planning per their recommendations.    (R41.89) Cognitive impairment  Comment: Mild to moderate impairment. Likely safe with daily checks.      Electronically signed by: WILLIS Grove CNP           Sincerely,        WILLIS Grove CNP    "

## 2023-03-11 NOTE — PROGRESS NOTES
[facility] GERIATRICS  INITIAL VISIT NOTE  March 13, 2023    PRIMARY CARE PROVIDER AND CLINIC:  Mikal Perez [facility] [address] [place] / [place] [facility] Medical Record Number:  [id number]  Place of Service where encounter took place:  [facility] (U) [[id number]]    Chief Complaint   Patient presents with     Hospital F/U       HPI:    Isamar Little is a 89 year old  (5/3/1928) female seen today at [facility] TCU. Medical history is notable for CAD, HTN, HFpEF, atrial fibrillation and SSS s/p PPM. She was seen in the [facility] ER on 2/15/23 after a fall at home, with head strike but no loss of consciousness. Exam with a scalp hematoma, no laceration and imaging of head and neck without acute injury. She was hospitalized at [facility] from 2/18/23 to 2/21/23 where she presented with weakness, falls and RLE swelling. U/S without RLE DVT. PT/OT recommended TCU stay.  She was admitted to this facility for  rehab, medical management and nursing care.      History obtained from: facility chart records, facility staff, patient report, [facility] chart review and Care Everywhere [facility] chart review.      Today, Ms. Little is seen sitting in her wheelchair before lunch.  She is a limited historian ([facility] 18/30).  I am meeting her for the first time today. She has dependent LE edema, R>L with ACE wraps in place. Cardiology note from February 2022 reviewed with decrease in furosemide from 20 mg to 10 mg due to lightheadedness. She isn't able to tell me if this is better, worse or the same swelling. No pain with this. No chest pain. No dyspnea. No concerns today per discussion with nursing. She is working with therapies. Plan is for discharge on Thurs 3/16 with home care.     CODE STATUS: DNR / DNI    ALLERGIES:  Allergies   Allergen Reactions     Cephalexin Rash       PAST MEDICAL HISTORY:   Past Medical History:   Diagnosis Date     Adjustment  disorder with mixed anxiety and depressed mood      Anxiety      Anxiety     Previously on sertraline     Anxiety state, unspecified      Arthritis      Atherosclerotic heart disease of native coronary artery without angina pectoris 5/16/2017     Bereavement, uncomplicated      Cardiac pacemaker in situ 5/31/2017    [employer] Jaspal MEEKS DOI: 5/17/17 Dr. David Patel      Chronic diastolic congestive heart failure (H) 6/28/2018     Chronic edema 7/13/2020     Closed fracture of tooth with routine healing 4/23/2019     Coronary artery disease      Coronary atherosclerosis of native coronary artery      Depressive disorder, not elsewhere classified      Edema      Essential hypertension with goal blood pressure less than 140/90      Essential hypertension, benign      Gastritis      Gastroesophageal reflux disease 7/13/2020     Hyperlipidemia      Hyperlipidemia with target LDL less than 70      Hypertension      Osteoporosis without current pathological fracture, unspecified osteoporosis type 7/13/2020     Other and unspecified hyperlipidemia      Other malaise and fatigue      Paroxysmal atrial fibrillation (H)      Primary insomnia 7/13/2020     Primary osteoarthritis 7/13/2020     Skin cancer      Skin cancer      SSS (sick sinus syndrome) (H)      Stented coronary artery     STENT X 2       PAST SURGICAL HISTORY:   Past Surgical History:   Procedure Laterality Date     ANGIOPLASTY  06/2008    stents x3     CAPSULOTOMY Left 1/2014     CARDIAC SURGERY  06/11/2008    angioplasty w/stent     CATARACT EXTRACTION, BILATERAL  1/2010     DILATION AND CURETTAGE      X3     ENT SURGERY      T&A     EYE SURGERY Bilateral     IOL     EYE SURGERY Left     YAG      GYN SURGERY      D&C X 3     IMPLANT PACEMAKER       IR ATHERECTOMY  6/11/2008     MOHS MICROGRAPHIC PROCEDURE Left 8/19/2014    Procedure: MOHS MICROGRAPHIC PROCEDURE;  Surgeon: Hector Manjarrez MD;  Location: [facility]     TONSILLECTOMY & ADENOIDECTOMY    "      FAMILY HISTORY:   Family History   Problem Relation Age of Onset     Hypertension Mother      Hypertension Father      Cerebrovascular Disease Father      Kidney Disease Maternal Grandmother      Kidney Disease Maternal Grandfather      Early Death Maternal Grandfather      Heart Disease Maternal Grandfather      Kidney Disease Paternal Grandmother      Kidney Disease Paternal Grandfather      Early Death Paternal Grandfather      Heart Disease Paternal Grandfather      Coronary Artery Disease Sister      Coronary Artery Disease Brother      Dementia Brother         disabled vet     SOCIAL HISTORY:   Patient's living condition: lives alone in independent living facility     MEDICATIONS:  Post Discharge Medication Reconciliation Status: discharge medications reconciled and changed, per note/orders.  Current Outpatient Medications   Medication Sig Dispense Refill     acetaminophen (TYLENOL) 325 MG tablet Take 2 tablets (650 mg) by mouth every 6 hours as needed for mild pain or other (and adjunct with moderate or severe pain or per patient request)       aspirin (ASA) 81 MG EC tablet Take 81 mg by mouth every evening       docusate sodium (COLACE) 100 MG capsule Take 1 capsule (100 mg) by mouth 2 times daily as needed for constipation       escitalopram (LEXAPRO) 10 MG tablet Take 10 mg by mouth daily       furosemide (LASIX) 20 MG tablet Take 10 mg by mouth daily       melatonin 3 MG tablet Take 1 tablet (3 mg) by mouth At Bedtime       nitroGLYcerin (NITROSTAT) 0.4 MG sublingual tablet Place 0.4 mg under the tongue every 5 minutes as needed       pravastatin (PRAVACHOL) 40 MG tablet TAKE ONE AND ONE-HALF TABLETS BY MOUTH AT BEDTIME 90 tablet 0       ROS:  Unable to obtain due to cognitive impairment or aphasia. SLUMS 18/30.     PHYSICAL EXAM:  /70   Pulse 80   Temp 98.4  F (36.9  C)   Resp 18   Ht 1.499 m (4' 11\")   Wt 66 kg (145 lb 9.6 oz)   SpO2 97%   BMI 29.41 kg/m    Gen: sitting in wheelchair, " alert, cooperative and in no acute distress  Card: RRR, S1, S2, no murmurs  Resp: lungs clear to auscultation bilaterally, no crackles or wheezes   Ext: bilateral LE edema, R>L, with ACE wraps in place   Neuro: CX II-XII grossly in tact; ROM in all four extremities grossly in tact  Psych: memory, judgement and insight impaired    LABORATORY/IMAGING DATA:  Reviewed as per [facility] and/or [facility]    ASSESSMENT/PLAN:    Posterior Head Hematoma, Resolved  Secondary to a fall.   -- PT/OT    Bilateral LE Edema  Dependent vs lymphedema. R >L. U/S at hospital negative for DVT. Her weights are up a bit at TCU, but would correlate with the thick ACE wraps. Lungs clear. No hypoxia. Recent decrease in furosemide by cardiology in Feb 2022  -- continue compression, elevation  -- given discharge is in 48 hours and she clinically does not appear hypervolemic, hesitate to adjust furosemide -- she sees cardiology on 3/20    CAD, HFpEF (EF 60-65%), HTN, HLD  SBPs 120s-140s. Weights difficult to interpret - labile, some 139 lbs and some 145 lbs.   -- ASA 81 mg daily, furosemide 10 mg daily and pravastatin 60 mg daily  -- follow BPs, weights, clinical volume status    Paroxysmal Atrial Fibrillation   SSS s/p PPM (2017)   HR 70s  -- ASA 81 mg daily    Cognitive Impairment  SLUMS 18/30  -- OT following     Mild Major Recurrent Depression  Mood and spirits seem OK today  -- escitalopram 10 mg daily  -- supportive cares    Recurrent Falls  Physical Deconditioning  In setting of hospitalization and underlying medical conditions  -- ongoing PT/OT      Electronically signed by:  Angie Crandall MD              Documentation of Face-to-Face and Certification for Home Health Services     Patient: Isamar Little   YOB: 1928  MR Number: [id number]  Today's Date: 3/13/2023    I certify that patient: Isamar Little is under my care and that I, or a nurse practitioner or physician's assistant working with me, had a  face-to-face encounter that meets the physician face-to-face encounter requirements with this patient on: March 13, 2023    This encounter with the patient was in whole, or in part, for the following medical condition, which is the primary reason for home health care: weakness, falls, cognitive impairment, LE edema, physical deconditioning     I certify that, based on my findings, the following services are medically necessary home health services: Nursing, Occupational Therapy, Physical Therapy and HHA.    My clinical findings support the need for the above services because: Nurse is needed: med set up and education, leg edema., Occupational Therapy Services are needed to assess and treat cognitive ability and address ADL safety due to impairment in ongoing strength, balance, endurance, ADLs. and Physical Therapy Services are needed to assess and treat the following functional impairments: ongoing strength, balance, endurance.    Further, I certify that my clinical findings support that this patient is homebound (i.e. absences from home require considerable and taxing effort and are for medical reasons or Restorationism services or infrequently or of short duration when for other reasons) because: unable to leave home without assistance     Based on the above findings. I certify that this patient is confined to the home and needs intermittent skilled nursing care, physical therapy and/or speech therapy.  The patient is under my care, and I have initiated the establishment of the plan of care.  This patient will be followed by a physician who will periodically review the plan of care.  Physician/Provider to provide follow up care: Mikal Perez    Responsible Medicare certified PECOS Physician: Angie Crandall MD  Electronically Signed by: Angie Crandall MD    Date: 3/13/2023

## 2023-03-13 ENCOUNTER — TRANSITIONAL CARE UNIT VISIT (OUTPATIENT)
Dept: GERIATRICS | Facility: CLINIC | Age: 88
End: 2023-03-13
Payer: MEDICARE

## 2023-03-13 VITALS
HEIGHT: 59 IN | WEIGHT: 145.6 LBS | TEMPERATURE: 98.4 F | OXYGEN SATURATION: 97 % | BODY MASS INDEX: 29.35 KG/M2 | RESPIRATION RATE: 18 BRPM | DIASTOLIC BLOOD PRESSURE: 70 MMHG | SYSTOLIC BLOOD PRESSURE: 116 MMHG | HEART RATE: 80 BPM

## 2023-03-13 DIAGNOSIS — R29.6 RECURRENT FALLS: Primary | ICD-10-CM

## 2023-03-13 DIAGNOSIS — S09.90XA TRAUMATIC INJURY OF HEAD WITH HEMATOMA OF SCALP: ICD-10-CM

## 2023-03-13 DIAGNOSIS — M79.89 RIGHT LEG SWELLING: ICD-10-CM

## 2023-03-13 DIAGNOSIS — R41.89 COGNITIVE IMPAIRMENT: ICD-10-CM

## 2023-03-13 DIAGNOSIS — I48.0 PAROXYSMAL ATRIAL FIBRILLATION (H): ICD-10-CM

## 2023-03-13 DIAGNOSIS — S00.03XA TRAUMATIC INJURY OF HEAD WITH HEMATOMA OF SCALP: ICD-10-CM

## 2023-03-13 DIAGNOSIS — I50.32 CHRONIC HEART FAILURE WITH PRESERVED EJECTION FRACTION (HFPEF) (H): ICD-10-CM

## 2023-03-13 DIAGNOSIS — I10 PRIMARY HYPERTENSION: ICD-10-CM

## 2023-03-13 DIAGNOSIS — R53.81 PHYSICAL DECONDITIONING: ICD-10-CM

## 2023-03-13 DIAGNOSIS — Z95.0 CARDIAC PACEMAKER IN SITU: ICD-10-CM

## 2023-03-13 DIAGNOSIS — I25.10 CORONARY ARTERY DISEASE INVOLVING NATIVE CORONARY ARTERY OF NATIVE HEART WITHOUT ANGINA PECTORIS: ICD-10-CM

## 2023-03-13 PROCEDURE — 99306 1ST NF CARE HIGH MDM 50: CPT | Performed by: INTERNAL MEDICINE

## 2023-03-13 NOTE — LETTER
3/13/2023        RE: Isamar Little  [address]  [address]  [place] [address]        [facility] GERIATRICS  INITIAL VISIT NOTE  March 13, 2023    PRIMARY CARE PROVIDER AND CLINIC:  Mikal Perez [facility] [address] / [place] [facility] Medical Record Number:  [id number]  Place of Service where encounter took place:  [facility] (U) [[id number]]    Chief Complaint   Patient presents with     Hospital F/U       HPI:    Isamar Little is a 89 year old  (5/3/1928) female seen today at [facility] TCU. Medical history is notable for CAD, HTN, HFpEF, atrial fibrillation and SSS s/p PPM. She was seen in the [facility] ER on 2/15/23 after a fall at home, with head strike but no loss of consciousness. Exam with a scalp hematoma, no laceration and imaging of head and neck without acute injury. She was hospitalized at [facility] from 2/18/23 to 2/21/23 where she presented with weakness, falls and RLE swelling. U/S without RLE DVT. PT/OT recommended TCU stay.  She was admitted to this facility for  rehab, medical management and nursing care.      History obtained from: facility chart records, facility staff, patient report, [facility] chart review and Care Everywhere [facility] chart review.      Today, Ms. Little is seen sitting in her wheelchair before lunch.  She is a limited historian ([facility] 18/30).  I am meeting her for the first time today. She has dependent LE edema, R>L with ACE wraps in place. Cardiology note from February 2022 reviewed with decrease in furosemide from 20 mg to 10 mg due to lightheadedness. She isn't able to tell me if this is better, worse or the same swelling. No pain with this. No chest pain. No dyspnea. No concerns today per discussion with nursing. She is working with therapies. Plan is for discharge on Thurs 3/16 with home care.     CODE STATUS: DNR / DNI    ALLERGIES:  Allergies   Allergen Reactions      Cephalexin Rash       PAST MEDICAL HISTORY:   Past Medical History:   Diagnosis Date     Adjustment disorder with mixed anxiety and depressed mood      Anxiety      Anxiety     Previously on sertraline     Anxiety state, unspecified      Arthritis      Atherosclerotic heart disease of native coronary artery without angina pectoris 5/16/2017     Bereavement, uncomplicated      Cardiac pacemaker in situ 5/31/2017    [employer] Jaspal MEEKS DOI: 5/17/17 Dr. David Patel      Chronic diastolic congestive heart failure (H) 6/28/2018     Chronic edema 7/13/2020     Closed fracture of tooth with routine healing 4/23/2019     Coronary artery disease      Coronary atherosclerosis of native coronary artery      Depressive disorder, not elsewhere classified      Edema      Essential hypertension with goal blood pressure less than 140/90      Essential hypertension, benign      Gastritis      Gastroesophageal reflux disease 7/13/2020     Hyperlipidemia      Hyperlipidemia with target LDL less than 70      Hypertension      Osteoporosis without current pathological fracture, unspecified osteoporosis type 7/13/2020     Other and unspecified hyperlipidemia      Other malaise and fatigue      Paroxysmal atrial fibrillation (H)      Primary insomnia 7/13/2020     Primary osteoarthritis 7/13/2020     Skin cancer      Skin cancer      SSS (sick sinus syndrome) (H)      Stented coronary artery     STENT X 2       PAST SURGICAL HISTORY:   Past Surgical History:   Procedure Laterality Date     ANGIOPLASTY  06/2008    stents x3     CAPSULOTOMY Left 1/2014     CARDIAC SURGERY  06/11/2008    angioplasty w/stent     CATARACT EXTRACTION, BILATERAL  1/2010     DILATION AND CURETTAGE      X3     ENT SURGERY      T&A     EYE SURGERY Bilateral     IOL     EYE SURGERY Left     YAG      GYN SURGERY      D&C X 3     IMPLANT PACEMAKER       IR ATHERECTOMY  6/11/2008     MOHS MICROGRAPHIC PROCEDURE Left 8/19/2014    Procedure: MOHS MICROGRAPHIC  PROCEDURE;  Surgeon: Hector Manjarrez MD;  Location: [facility]     TONSILLECTOMY & ADENOIDECTOMY         FAMILY HISTORY:   Family History   Problem Relation Age of Onset     Hypertension Mother      Hypertension Father      Cerebrovascular Disease Father      Kidney Disease Maternal Grandmother      Kidney Disease Maternal Grandfather      Early Death Maternal Grandfather      Heart Disease Maternal Grandfather      Kidney Disease Paternal Grandmother      Kidney Disease Paternal Grandfather      Early Death Paternal Grandfather      Heart Disease Paternal Grandfather      Coronary Artery Disease Sister      Coronary Artery Disease Brother      Dementia Brother         disabled vet     SOCIAL HISTORY:   Patient's living condition: lives alone in independent living facility     MEDICATIONS:  Post Discharge Medication Reconciliation Status: discharge medications reconciled and changed, per note/orders.  Current Outpatient Medications   Medication Sig Dispense Refill     acetaminophen (TYLENOL) 325 MG tablet Take 2 tablets (650 mg) by mouth every 6 hours as needed for mild pain or other (and adjunct with moderate or severe pain or per patient request)       aspirin (ASA) 81 MG EC tablet Take 81 mg by mouth every evening       docusate sodium (COLACE) 100 MG capsule Take 1 capsule (100 mg) by mouth 2 times daily as needed for constipation       escitalopram (LEXAPRO) 10 MG tablet Take 10 mg by mouth daily       furosemide (LASIX) 20 MG tablet Take 10 mg by mouth daily       melatonin 3 MG tablet Take 1 tablet (3 mg) by mouth At Bedtime       nitroGLYcerin (NITROSTAT) 0.4 MG sublingual tablet Place 0.4 mg under the tongue every 5 minutes as needed       pravastatin (PRAVACHOL) 40 MG tablet TAKE ONE AND ONE-HALF TABLETS BY MOUTH AT BEDTIME 90 tablet 0       ROS:  Unable to obtain due to cognitive impairment or aphasia. SLUMS 18/30.     PHYSICAL EXAM:  /70   Pulse 80   Temp 98.4  F (36.9  C)   Resp 18   Ht 1.499  "m (4' 11\")   Wt 66 kg (145 lb 9.6 oz)   SpO2 97%   BMI 29.41 kg/m    Gen: sitting in wheelchair, alert, cooperative and in no acute distress  Card: RRR, S1, S2, no murmurs  Resp: lungs clear to auscultation bilaterally, no crackles or wheezes   Ext: bilateral LE edema, R>L, with ACE wraps in place   Neuro: CX II-XII grossly in tact; ROM in all four extremities grossly in tact  Psych: memory, judgement and insight impaired    LABORATORY/IMAGING DATA:  Reviewed as per [facility] and/or [facility]    ASSESSMENT/PLAN:    Posterior Head Hematoma, Resolved  Secondary to a fall.   -- PT/OT    Bilateral LE Edema  Dependent vs lymphedema. R >L. U/S at hospital negative for DVT. Her weights are up a bit at TCU, but would correlate with the thick ACE wraps. Lungs clear. No hypoxia. Recent decrease in furosemide by cardiology in Feb 2022  -- continue compression, elevation  -- given discharge is in 48 hours and she clinically does not appear hypervolemic, hesitate to adjust furosemide -- she sees cardiology on 3/20    CAD, HFpEF (EF 60-65%), HTN, HLD  SBPs 120s-140s. Weights difficult to interpret - labile, some 139 lbs and some 145 lbs.   -- ASA 81 mg daily, furosemide 10 mg daily and pravastatin 60 mg daily  -- follow BPs, weights, clinical volume status    Paroxysmal Atrial Fibrillation   SSS s/p PPM (2017)   HR 70s  -- ASA 81 mg daily    Cognitive Impairment  SLUMS 18/30  -- OT following     Mild Major Recurrent Depression  Mood and spirits seem OK today  -- escitalopram 10 mg daily  -- supportive cares    Recurrent Falls  Physical Deconditioning  In setting of hospitalization and underlying medical conditions  -- ongoing PT/OT      Electronically signed by:  Angie Crandall MD              Documentation of Face-to-Face and Certification for Home Health Services     Patient: Isamar Little   YOB: 1928  MR Number: [id number]  Today's Date: 3/13/2023    I certify that patient: Isamar Little is under my " care and that I, or a nurse practitioner or physician's assistant working with me, had a face-to-face encounter that meets the physician face-to-face encounter requirements with this patient on: March 13, 2023    This encounter with the patient was in whole, or in part, for the following medical condition, which is the primary reason for home health care: weakness, falls, cognitive impairment, LE edema, physical deconditioning     I certify that, based on my findings, the following services are medically necessary home health services: Nursing, Occupational Therapy, Physical Therapy and HHA.    My clinical findings support the need for the above services because: Nurse is needed: med set up and education, leg edema., Occupational Therapy Services are needed to assess and treat cognitive ability and address ADL safety due to impairment in ongoing strength, balance, endurance, ADLs. and Physical Therapy Services are needed to assess and treat the following functional impairments: ongoing strength, balance, endurance.    Further, I certify that my clinical findings support that this patient is homebound (i.e. absences from home require considerable and taxing effort and are for medical reasons or Spiritism services or infrequently or of short duration when for other reasons) because: unable to leave home without assistance     Based on the above findings. I certify that this patient is confined to the home and needs intermittent skilled nursing care, physical therapy and/or speech therapy.  The patient is under my care, and I have initiated the establishment of the plan of care.  This patient will be followed by a physician who will periodically review the plan of care.  Physician/Provider to provide follow up care: Mikal Perez    Responsible Medicare certified PECOS Physician: Angie Crandall MD  Electronically Signed by: Angie Crandall MD    Date: 3/13/2023                      Sincerely,        Angie Carreon  MD Raz

## 2023-03-15 ENCOUNTER — TRANSITIONAL CARE UNIT VISIT (OUTPATIENT)
Dept: GERIATRICS | Facility: CLINIC | Age: 88
End: 2023-03-15
Payer: MEDICARE

## 2023-03-15 VITALS
HEIGHT: 59 IN | HEART RATE: 86 BPM | WEIGHT: 143.8 LBS | SYSTOLIC BLOOD PRESSURE: 138 MMHG | RESPIRATION RATE: 16 BRPM | TEMPERATURE: 97.8 F | BODY MASS INDEX: 28.99 KG/M2 | OXYGEN SATURATION: 95 % | DIASTOLIC BLOOD PRESSURE: 100 MMHG

## 2023-03-15 DIAGNOSIS — I50.32 CHRONIC DIASTOLIC CONGESTIVE HEART FAILURE (H): Primary | ICD-10-CM

## 2023-03-15 PROCEDURE — 99308 SBSQ NF CARE LOW MDM 20: CPT | Performed by: NURSE PRACTITIONER

## 2023-03-15 NOTE — PROGRESS NOTES
[facility] GERIATRICS  Face to Face and Medical Necessity Statement for DME Provider visit    Patient: Isamar Little  Gender: female  YOB: 1928  [facility] Medical Record Number: [id number]  Demographics:  [address]  [address] [place] [id number]  [phone] (home)   Social Security Number: [id number]  Primary Care Provider: Mikal Perez  Insurance: Payor: MEDICARE / Plan: MEDICARE / Product Type: Medicare /     HPI: Isamar Little is a 89 year old (5/3/1928), who is being seen today for a face to face provider visit at [facility] ([facility] [[id number]]. Medical necessity statement for DME included.     This patient requires the following: DME Ordered and Medical Necessity Statement   Manual wheelchair  Size: 18W x 16D   Leg rests: standard  Cushion: seat    The patient has mobility limitation that significantly impairs their ability to participate in one or more mobility related activities: Toileting, Feeding, Grooming and Bathing due to (I50.32) Chronic diastolic congestive heart failure. The patient's mobility limitations cannot be safely resolved by using a cane/walker. Patient and/or caregiver is able to safely use the manual wheelchair. Patient's functional mobility deficit can be sufficiently resolved by the use of a manual wheelchair. Patient's home provides adequate access between rooms, maneuvering space, and surfaces for use of a manual wheelchair. The patient has not expressed unwillingness to use a manual wheelchair in the home.       Pt needing above DME with expected length of need of 99 months due to medical necessity associated with following diagnosis:  Chronic diastolic congestive heart failure (H)      Past Medical History:   has a past medical history of Adjustment disorder with mixed anxiety and depressed mood, Anxiety, Anxiety, Anxiety state, unspecified, Arthritis, Atherosclerotic heart disease of native coronary artery without angina pectoris  "(5/16/2017), Bereavement, uncomplicated, Cardiac pacemaker in situ (5/31/2017), Chronic diastolic congestive heart failure (H) (6/28/2018), Chronic edema (7/13/2020), Closed fracture of tooth with routine healing (4/23/2019), Coronary artery disease, Coronary atherosclerosis of native coronary artery, Depressive disorder, not elsewhere classified, Edema, Essential hypertension with goal blood pressure less than 140/90, Essential hypertension, benign, Gastritis, Gastroesophageal reflux disease (7/13/2020), Hyperlipidemia, Hyperlipidemia with target LDL less than 70, Hypertension, Osteoporosis without current pathological fracture, unspecified osteoporosis type (7/13/2020), Other and unspecified hyperlipidemia, Other malaise and fatigue, Paroxysmal atrial fibrillation (H), Primary insomnia (7/13/2020), Primary osteoarthritis (7/13/2020), Skin cancer, Skin cancer, SSS (sick sinus syndrome) (H), and Stented coronary artery.    She has no past medical history of Difficult intubation, Malignant hyperthermia, PONV (postoperative nausea and vomiting), or Spinal headache.    Review of Systems:  4 point ROS including Respiratory, CV, GI and , other than that noted in the HPI,  is negative    Exam:  Vitals: BP (!) 138/100   Pulse 86   Temp 97.8  F (36.6  C)   Resp 16   Ht 1.499 m (4' 11\")   Wt 65.2 kg (143 lb 12.8 oz)   SpO2 95%   BMI 29.04 kg/m    BMI: Body mass index is 29.04 kg/m .   GENERAL APPEARANCE:  Alert, in no distress  ENT:  Mouth and posterior oropharynx normal, moist mucous membranes, [place]  EYES:  EOM normal, conjunctiva and lids normal  RESP:  respiratory effort and palpation of chest normal, lungs clear to auscultation , no respiratory distress  CV:  2+ edema in ankles  PSYCH:  oriented X 3, affect and mood normal      Assessment/Plan:  1. Chronic diastolic congestive heart failure (H)        Orders:  1. Facility staff/TC to contact DME company to get their order form for provider to fill " out    ELECTRONICALLY SIGNED BY BRIANNA CERTIFIED PROVIDER: WILLIS Grove CNP   NPI: [id number]  [facility] GERIATRICS [address] [place] [address]

## 2023-03-15 NOTE — LETTER
3/15/2023        RE: Isamar Little  [address]  [address]  [facility] [place] [facility] [address]        [facility]  Face to Face and Medical Necessity Statement for DME Provider visit    Patient: Isamar Little  Gender: female  YOB: 1928  [facility] Record Number: [id number]  Demographics:  [address]  [address]  [facility] [address]  [phone] (home)   Social Security Number: [id number]  Primary Care Provider: Mikal Perez  Insurance: Payor: MEDICARE / Plan: MEDICARE / Product Type: Medicare /     HPI: Isamar Little is a 89 year old (5/3/1928), who is being seen today for a face to face provider visit at [facility] ([facility]) [[id number]]. Medical necessity statement for DME included.     This patient requires the following: DME Ordered and Medical Necessity Statement   Manual wheelchair  Size: 18W x 16D   Leg rests: standard  Cushion: seat    The patient has mobility limitation that significantly impairs their ability to participate in one or more mobility related activities: Toileting, Feeding, Grooming and Bathing due to (I50.32) Chronic diastolic congestive heart failure. The patient's mobility limitations cannot be safely resolved by using a cane/walker. Patient and/or caregiver is able to safely use the manual wheelchair. Patient's functional mobility deficit can be sufficiently resolved by the use of a manual wheelchair. Patient's home provides adequate access between rooms, maneuvering space, and surfaces for use of a manual wheelchair. The patient has not expressed unwillingness to use a manual wheelchair in the home.       Pt needing above DME with expected length of need of 99 months due to medical necessity associated with following diagnosis:  Chronic diastolic congestive heart failure (H)      Past Medical History:   has a past medical history of Adjustment disorder with mixed anxiety and depressed mood, Anxiety, Anxiety, Anxiety state,  "unspecified, Arthritis, Atherosclerotic heart disease of native coronary artery without angina pectoris (5/16/2017), Bereavement, uncomplicated, Cardiac pacemaker in situ (5/31/2017), Chronic diastolic congestive heart failure (H) (6/28/2018), Chronic edema (7/13/2020), Closed fracture of tooth with routine healing (4/23/2019), Coronary artery disease, Coronary atherosclerosis of native coronary artery, Depressive disorder, not elsewhere classified, Edema, Essential hypertension with goal blood pressure less than 140/90, Essential hypertension, benign, Gastritis, Gastroesophageal reflux disease (7/13/2020), Hyperlipidemia, Hyperlipidemia with target LDL less than 70, Hypertension, Osteoporosis without current pathological fracture, unspecified osteoporosis type (7/13/2020), Other and unspecified hyperlipidemia, Other malaise and fatigue, Paroxysmal atrial fibrillation (H), Primary insomnia (7/13/2020), Primary osteoarthritis (7/13/2020), Skin cancer, Skin cancer, SSS (sick sinus syndrome) (H), and Stented coronary artery.    She has no past medical history of Difficult intubation, Malignant hyperthermia, PONV (postoperative nausea and vomiting), or Spinal headache.    Review of Systems:  4 point ROS including Respiratory, CV, GI and , other than that noted in the HPI,  is negative    Exam:  Vitals: BP (!) 138/100   Pulse 86   Temp 97.8  F (36.6  C)   Resp 16   Ht 1.499 m (4' 11\")   Wt 65.2 kg (143 lb 12.8 oz)   SpO2 95%   BMI 29.04 kg/m    BMI: Body mass index is 29.04 kg/m .   GENERAL APPEARANCE:  Alert, in no distress  ENT:  Mouth and posterior oropharynx normal, moist mucous membranes, [place]  EYES:  EOM normal, conjunctiva and lids normal  RESP:  respiratory effort and palpation of chest normal, lungs clear to auscultation , no respiratory distress  CV:  2+ edema in ankles  PSYCH:  oriented X 3, affect and mood normal      Assessment/Plan:  1. Chronic diastolic congestive heart failure (H)  "       Orders:  1. Facility staff/TC to contact [employer] company to get their order form for provider to fill out    ELECTRONICALLY SIGNED BY BRIANNA CERTIFIED PROVIDER: WILLIS Grove CNP   NPI: [id number]  [facility] [address] [place] [address]            Sincerely,        WILLIS Grove CNP

## 2023-03-24 ENCOUNTER — LAB REQUISITION (OUTPATIENT)
Dept: LAB | Facility: CLINIC | Age: 88
End: 2023-03-24
Payer: MEDICARE

## 2023-03-24 DIAGNOSIS — M79.10 MYALGIA, UNSPECIFIED SITE: ICD-10-CM

## 2023-03-24 DIAGNOSIS — I50.32 CHRONIC DIASTOLIC (CONGESTIVE) HEART FAILURE (H): ICD-10-CM

## 2023-03-24 PROCEDURE — 82550 ASSAY OF CK (CPK): CPT | Mod: ORL | Performed by: STUDENT IN AN ORGANIZED HEALTH CARE EDUCATION/TRAINING PROGRAM

## 2023-03-24 PROCEDURE — 80048 BASIC METABOLIC PNL TOTAL CA: CPT | Mod: ORL | Performed by: STUDENT IN AN ORGANIZED HEALTH CARE EDUCATION/TRAINING PROGRAM

## 2023-03-25 LAB
ANION GAP SERPL CALCULATED.3IONS-SCNC: 15 MMOL/L (ref 7–15)
BUN SERPL-MCNC: 17.8 MG/DL (ref 8–23)
CALCIUM SERPL-MCNC: 8.9 MG/DL (ref 8.2–9.6)
CHLORIDE SERPL-SCNC: 104 MMOL/L (ref 98–107)
CK SERPL-CCNC: 66 U/L (ref 26–192)
CREAT SERPL-MCNC: 0.73 MG/DL (ref 0.51–0.95)
DEPRECATED HCO3 PLAS-SCNC: 24 MMOL/L (ref 22–29)
GFR SERPL CREATININE-BSD FRML MDRD: 76 ML/MIN/1.73M2
GLUCOSE SERPL-MCNC: 108 MG/DL (ref 70–99)
POTASSIUM SERPL-SCNC: 4.3 MMOL/L (ref 3.4–5.3)
SODIUM SERPL-SCNC: 143 MMOL/L (ref 136–145)

## 2023-04-05 ENCOUNTER — ANCILLARY PROCEDURE (OUTPATIENT)
Dept: CARDIOLOGY | Facility: CLINIC | Age: 88
End: 2023-04-05
Attending: INTERNAL MEDICINE
Payer: MEDICARE

## 2023-04-05 DIAGNOSIS — Z95.0 CARDIAC PACEMAKER IN SITU: ICD-10-CM

## 2023-04-05 DIAGNOSIS — I49.5 SSS (SICK SINUS SYNDROME) (H): ICD-10-CM

## 2023-04-07 LAB
MDC_IDC_EPISODE_DTM: NORMAL
MDC_IDC_EPISODE_DTM: NORMAL
MDC_IDC_EPISODE_DURATION: 2 S
MDC_IDC_EPISODE_DURATION: 2 S
MDC_IDC_EPISODE_ID: 27
MDC_IDC_EPISODE_ID: 28
MDC_IDC_EPISODE_TYPE: NORMAL
MDC_IDC_EPISODE_TYPE: NORMAL
MDC_IDC_LEAD_IMPLANT_DT: NORMAL
MDC_IDC_LEAD_IMPLANT_DT: NORMAL
MDC_IDC_LEAD_LOCATION: NORMAL
MDC_IDC_LEAD_LOCATION: NORMAL
MDC_IDC_LEAD_LOCATION_DETAIL_1: NORMAL
MDC_IDC_LEAD_LOCATION_DETAIL_1: NORMAL
MDC_IDC_LEAD_MFG: NORMAL
MDC_IDC_LEAD_MFG: NORMAL
MDC_IDC_LEAD_MODEL: NORMAL
MDC_IDC_LEAD_MODEL: NORMAL
MDC_IDC_LEAD_POLARITY_TYPE: NORMAL
MDC_IDC_LEAD_POLARITY_TYPE: NORMAL
MDC_IDC_LEAD_SERIAL: NORMAL
MDC_IDC_LEAD_SERIAL: NORMAL
MDC_IDC_LEAD_SPECIAL_FUNCTION: NORMAL
MDC_IDC_LEAD_SPECIAL_FUNCTION: NORMAL
MDC_IDC_MSMT_BATTERY_DTM: NORMAL
MDC_IDC_MSMT_BATTERY_REMAINING_LONGEVITY: 60 MO
MDC_IDC_MSMT_BATTERY_RRT_TRIGGER: 2.83
MDC_IDC_MSMT_BATTERY_STATUS: NORMAL
MDC_IDC_MSMT_BATTERY_VOLTAGE: 2.99 V
MDC_IDC_MSMT_LEADCHNL_RA_IMPEDANCE_VALUE: 323 OHM
MDC_IDC_MSMT_LEADCHNL_RA_IMPEDANCE_VALUE: 437 OHM
MDC_IDC_MSMT_LEADCHNL_RA_PACING_THRESHOLD_AMPLITUDE: 1 V
MDC_IDC_MSMT_LEADCHNL_RA_PACING_THRESHOLD_PULSEWIDTH: 0.4 MS
MDC_IDC_MSMT_LEADCHNL_RA_SENSING_INTR_AMPL: 2.12 MV
MDC_IDC_MSMT_LEADCHNL_RA_SENSING_INTR_AMPL: 2.12 MV
MDC_IDC_MSMT_LEADCHNL_RV_IMPEDANCE_VALUE: 342 OHM
MDC_IDC_MSMT_LEADCHNL_RV_IMPEDANCE_VALUE: 418 OHM
MDC_IDC_MSMT_LEADCHNL_RV_PACING_THRESHOLD_AMPLITUDE: 1 V
MDC_IDC_MSMT_LEADCHNL_RV_PACING_THRESHOLD_PULSEWIDTH: 0.4 MS
MDC_IDC_MSMT_LEADCHNL_RV_SENSING_INTR_AMPL: 5.62 MV
MDC_IDC_MSMT_LEADCHNL_RV_SENSING_INTR_AMPL: 5.62 MV
MDC_IDC_PG_IMPLANT_DTM: NORMAL
MDC_IDC_PG_MFG: NORMAL
MDC_IDC_PG_MODEL: NORMAL
MDC_IDC_PG_SERIAL: NORMAL
MDC_IDC_PG_TYPE: NORMAL
MDC_IDC_SESS_CLINIC_NAME: NORMAL
MDC_IDC_SESS_DTM: NORMAL
MDC_IDC_SESS_TYPE: NORMAL
MDC_IDC_SET_BRADY_AT_MODE_SWITCH_RATE: 171 {BEATS}/MIN
MDC_IDC_SET_BRADY_HYSTRATE: NORMAL
MDC_IDC_SET_BRADY_LOWRATE: 60 {BEATS}/MIN
MDC_IDC_SET_BRADY_MAX_SENSOR_RATE: 130 {BEATS}/MIN
MDC_IDC_SET_BRADY_MAX_TRACKING_RATE: 130 {BEATS}/MIN
MDC_IDC_SET_BRADY_MODE: NORMAL
MDC_IDC_SET_BRADY_PAV_DELAY_LOW: 180 MS
MDC_IDC_SET_BRADY_SAV_DELAY_LOW: 150 MS
MDC_IDC_SET_LEADCHNL_RA_PACING_AMPLITUDE: 1.5 V
MDC_IDC_SET_LEADCHNL_RA_PACING_ANODE_ELECTRODE_1: NORMAL
MDC_IDC_SET_LEADCHNL_RA_PACING_ANODE_LOCATION_1: NORMAL
MDC_IDC_SET_LEADCHNL_RA_PACING_CAPTURE_MODE: NORMAL
MDC_IDC_SET_LEADCHNL_RA_PACING_CATHODE_ELECTRODE_1: NORMAL
MDC_IDC_SET_LEADCHNL_RA_PACING_CATHODE_LOCATION_1: NORMAL
MDC_IDC_SET_LEADCHNL_RA_PACING_POLARITY: NORMAL
MDC_IDC_SET_LEADCHNL_RA_PACING_PULSEWIDTH: 0.4 MS
MDC_IDC_SET_LEADCHNL_RA_SENSING_ANODE_ELECTRODE_1: NORMAL
MDC_IDC_SET_LEADCHNL_RA_SENSING_ANODE_LOCATION_1: NORMAL
MDC_IDC_SET_LEADCHNL_RA_SENSING_CATHODE_ELECTRODE_1: NORMAL
MDC_IDC_SET_LEADCHNL_RA_SENSING_CATHODE_LOCATION_1: NORMAL
MDC_IDC_SET_LEADCHNL_RA_SENSING_POLARITY: NORMAL
MDC_IDC_SET_LEADCHNL_RA_SENSING_SENSITIVITY: 0.3 MV
MDC_IDC_SET_LEADCHNL_RV_PACING_AMPLITUDE: 1.5 V
MDC_IDC_SET_LEADCHNL_RV_PACING_ANODE_ELECTRODE_1: NORMAL
MDC_IDC_SET_LEADCHNL_RV_PACING_ANODE_LOCATION_1: NORMAL
MDC_IDC_SET_LEADCHNL_RV_PACING_CAPTURE_MODE: NORMAL
MDC_IDC_SET_LEADCHNL_RV_PACING_CATHODE_ELECTRODE_1: NORMAL
MDC_IDC_SET_LEADCHNL_RV_PACING_CATHODE_LOCATION_1: NORMAL
MDC_IDC_SET_LEADCHNL_RV_PACING_POLARITY: NORMAL
MDC_IDC_SET_LEADCHNL_RV_PACING_PULSEWIDTH: 0.4 MS
MDC_IDC_SET_LEADCHNL_RV_SENSING_ANODE_ELECTRODE_1: NORMAL
MDC_IDC_SET_LEADCHNL_RV_SENSING_ANODE_LOCATION_1: NORMAL
MDC_IDC_SET_LEADCHNL_RV_SENSING_CATHODE_ELECTRODE_1: NORMAL
MDC_IDC_SET_LEADCHNL_RV_SENSING_CATHODE_LOCATION_1: NORMAL
MDC_IDC_SET_LEADCHNL_RV_SENSING_POLARITY: NORMAL
MDC_IDC_SET_LEADCHNL_RV_SENSING_SENSITIVITY: 0.9 MV
MDC_IDC_SET_ZONE_DETECTION_INTERVAL: 350 MS
MDC_IDC_SET_ZONE_DETECTION_INTERVAL: 400 MS
MDC_IDC_SET_ZONE_TYPE: NORMAL
MDC_IDC_STAT_AT_BURDEN_PERCENT: 0 %
MDC_IDC_STAT_AT_DTM_END: NORMAL
MDC_IDC_STAT_AT_DTM_START: NORMAL
MDC_IDC_STAT_BRADY_AP_VP_PERCENT: 0.01 %
MDC_IDC_STAT_BRADY_AP_VS_PERCENT: 23.56 %
MDC_IDC_STAT_BRADY_AS_VP_PERCENT: 0.02 %
MDC_IDC_STAT_BRADY_AS_VS_PERCENT: 76.4 %
MDC_IDC_STAT_BRADY_DTM_END: NORMAL
MDC_IDC_STAT_BRADY_DTM_START: NORMAL
MDC_IDC_STAT_BRADY_RA_PERCENT_PACED: 23.57 %
MDC_IDC_STAT_BRADY_RV_PERCENT_PACED: 0.04 %
MDC_IDC_STAT_EPISODE_RECENT_COUNT: 0
MDC_IDC_STAT_EPISODE_RECENT_COUNT: 2
MDC_IDC_STAT_EPISODE_RECENT_COUNT_DTM_END: NORMAL
MDC_IDC_STAT_EPISODE_RECENT_COUNT_DTM_START: NORMAL
MDC_IDC_STAT_EPISODE_TOTAL_COUNT: 0
MDC_IDC_STAT_EPISODE_TOTAL_COUNT: 26
MDC_IDC_STAT_EPISODE_TOTAL_COUNT_DTM_END: NORMAL
MDC_IDC_STAT_EPISODE_TOTAL_COUNT_DTM_START: NORMAL
MDC_IDC_STAT_EPISODE_TYPE: NORMAL

## 2023-04-07 PROCEDURE — 93294 REM INTERROG EVL PM/LDLS PM: CPT | Performed by: INTERNAL MEDICINE

## 2023-04-07 PROCEDURE — 93296 REM INTERROG EVL PM/IDS: CPT | Performed by: INTERNAL MEDICINE

## 2023-07-14 ENCOUNTER — ANCILLARY PROCEDURE (OUTPATIENT)
Dept: CARDIOLOGY | Facility: CLINIC | Age: 88
End: 2023-07-14
Attending: INTERNAL MEDICINE
Payer: MEDICARE

## 2023-07-14 DIAGNOSIS — Z95.0 CARDIAC PACEMAKER IN SITU: ICD-10-CM

## 2023-07-14 DIAGNOSIS — I49.5 SSS (SICK SINUS SYNDROME) (H): ICD-10-CM

## 2023-07-14 PROCEDURE — 93294 REM INTERROG EVL PM/LDLS PM: CPT | Performed by: INTERNAL MEDICINE

## 2023-07-14 PROCEDURE — 93296 REM INTERROG EVL PM/IDS: CPT | Performed by: INTERNAL MEDICINE

## 2023-07-17 LAB
MDC_IDC_EPISODE_DTM: NORMAL
MDC_IDC_EPISODE_DURATION: 3 S
MDC_IDC_EPISODE_ID: 29
MDC_IDC_EPISODE_TYPE: NORMAL
MDC_IDC_LEAD_IMPLANT_DT: NORMAL
MDC_IDC_LEAD_IMPLANT_DT: NORMAL
MDC_IDC_LEAD_LOCATION: NORMAL
MDC_IDC_LEAD_LOCATION: NORMAL
MDC_IDC_LEAD_LOCATION_DETAIL_1: NORMAL
MDC_IDC_LEAD_LOCATION_DETAIL_1: NORMAL
MDC_IDC_LEAD_MFG: NORMAL
MDC_IDC_LEAD_MFG: NORMAL
MDC_IDC_LEAD_MODEL: NORMAL
MDC_IDC_LEAD_MODEL: NORMAL
MDC_IDC_LEAD_POLARITY_TYPE: NORMAL
MDC_IDC_LEAD_POLARITY_TYPE: NORMAL
MDC_IDC_LEAD_SERIAL: NORMAL
MDC_IDC_LEAD_SERIAL: NORMAL
MDC_IDC_LEAD_SPECIAL_FUNCTION: NORMAL
MDC_IDC_LEAD_SPECIAL_FUNCTION: NORMAL
MDC_IDC_MSMT_BATTERY_DTM: NORMAL
MDC_IDC_MSMT_BATTERY_REMAINING_LONGEVITY: 52 MO
MDC_IDC_MSMT_BATTERY_RRT_TRIGGER: 2.83
MDC_IDC_MSMT_BATTERY_STATUS: NORMAL
MDC_IDC_MSMT_BATTERY_VOLTAGE: 2.99 V
MDC_IDC_MSMT_LEADCHNL_RA_IMPEDANCE_VALUE: 304 OHM
MDC_IDC_MSMT_LEADCHNL_RA_IMPEDANCE_VALUE: 418 OHM
MDC_IDC_MSMT_LEADCHNL_RA_PACING_THRESHOLD_AMPLITUDE: 0.88 V
MDC_IDC_MSMT_LEADCHNL_RA_PACING_THRESHOLD_PULSEWIDTH: 0.4 MS
MDC_IDC_MSMT_LEADCHNL_RA_SENSING_INTR_AMPL: 1.62 MV
MDC_IDC_MSMT_LEADCHNL_RA_SENSING_INTR_AMPL: 1.62 MV
MDC_IDC_MSMT_LEADCHNL_RV_IMPEDANCE_VALUE: 399 OHM
MDC_IDC_MSMT_LEADCHNL_RV_IMPEDANCE_VALUE: 475 OHM
MDC_IDC_MSMT_LEADCHNL_RV_PACING_THRESHOLD_AMPLITUDE: 1 V
MDC_IDC_MSMT_LEADCHNL_RV_PACING_THRESHOLD_PULSEWIDTH: 0.4 MS
MDC_IDC_MSMT_LEADCHNL_RV_SENSING_INTR_AMPL: 5.62 MV
MDC_IDC_MSMT_LEADCHNL_RV_SENSING_INTR_AMPL: 5.62 MV
MDC_IDC_PG_IMPLANT_DTM: NORMAL
MDC_IDC_PG_MFG: NORMAL
MDC_IDC_PG_MODEL: NORMAL
MDC_IDC_PG_SERIAL: NORMAL
MDC_IDC_PG_TYPE: NORMAL
MDC_IDC_SESS_CLINIC_NAME: NORMAL
MDC_IDC_SESS_DTM: NORMAL
MDC_IDC_SESS_TYPE: NORMAL
MDC_IDC_SET_BRADY_AT_MODE_SWITCH_RATE: 171 {BEATS}/MIN
MDC_IDC_SET_BRADY_HYSTRATE: NORMAL
MDC_IDC_SET_BRADY_LOWRATE: 60 {BEATS}/MIN
MDC_IDC_SET_BRADY_MAX_SENSOR_RATE: 130 {BEATS}/MIN
MDC_IDC_SET_BRADY_MAX_TRACKING_RATE: 130 {BEATS}/MIN
MDC_IDC_SET_BRADY_MODE: NORMAL
MDC_IDC_SET_BRADY_PAV_DELAY_LOW: 180 MS
MDC_IDC_SET_BRADY_SAV_DELAY_LOW: 150 MS
MDC_IDC_SET_LEADCHNL_RA_PACING_AMPLITUDE: 1.5 V
MDC_IDC_SET_LEADCHNL_RA_PACING_ANODE_ELECTRODE_1: NORMAL
MDC_IDC_SET_LEADCHNL_RA_PACING_ANODE_LOCATION_1: NORMAL
MDC_IDC_SET_LEADCHNL_RA_PACING_CAPTURE_MODE: NORMAL
MDC_IDC_SET_LEADCHNL_RA_PACING_CATHODE_ELECTRODE_1: NORMAL
MDC_IDC_SET_LEADCHNL_RA_PACING_CATHODE_LOCATION_1: NORMAL
MDC_IDC_SET_LEADCHNL_RA_PACING_POLARITY: NORMAL
MDC_IDC_SET_LEADCHNL_RA_PACING_PULSEWIDTH: 0.4 MS
MDC_IDC_SET_LEADCHNL_RA_SENSING_ANODE_ELECTRODE_1: NORMAL
MDC_IDC_SET_LEADCHNL_RA_SENSING_ANODE_LOCATION_1: NORMAL
MDC_IDC_SET_LEADCHNL_RA_SENSING_CATHODE_ELECTRODE_1: NORMAL
MDC_IDC_SET_LEADCHNL_RA_SENSING_CATHODE_LOCATION_1: NORMAL
MDC_IDC_SET_LEADCHNL_RA_SENSING_POLARITY: NORMAL
MDC_IDC_SET_LEADCHNL_RA_SENSING_SENSITIVITY: 0.3 MV
MDC_IDC_SET_LEADCHNL_RV_PACING_AMPLITUDE: 1.5 V
MDC_IDC_SET_LEADCHNL_RV_PACING_ANODE_ELECTRODE_1: NORMAL
MDC_IDC_SET_LEADCHNL_RV_PACING_ANODE_LOCATION_1: NORMAL
MDC_IDC_SET_LEADCHNL_RV_PACING_CAPTURE_MODE: NORMAL
MDC_IDC_SET_LEADCHNL_RV_PACING_CATHODE_ELECTRODE_1: NORMAL
MDC_IDC_SET_LEADCHNL_RV_PACING_CATHODE_LOCATION_1: NORMAL
MDC_IDC_SET_LEADCHNL_RV_PACING_POLARITY: NORMAL
MDC_IDC_SET_LEADCHNL_RV_PACING_PULSEWIDTH: 0.4 MS
MDC_IDC_SET_LEADCHNL_RV_SENSING_ANODE_ELECTRODE_1: NORMAL
MDC_IDC_SET_LEADCHNL_RV_SENSING_ANODE_LOCATION_1: NORMAL
MDC_IDC_SET_LEADCHNL_RV_SENSING_CATHODE_ELECTRODE_1: NORMAL
MDC_IDC_SET_LEADCHNL_RV_SENSING_CATHODE_LOCATION_1: NORMAL
MDC_IDC_SET_LEADCHNL_RV_SENSING_POLARITY: NORMAL
MDC_IDC_SET_LEADCHNL_RV_SENSING_SENSITIVITY: 0.9 MV
MDC_IDC_SET_ZONE_DETECTION_INTERVAL: 350 MS
MDC_IDC_SET_ZONE_DETECTION_INTERVAL: 400 MS
MDC_IDC_SET_ZONE_TYPE: NORMAL
MDC_IDC_STAT_AT_BURDEN_PERCENT: 0 %
MDC_IDC_STAT_AT_DTM_END: NORMAL
MDC_IDC_STAT_AT_DTM_START: NORMAL
MDC_IDC_STAT_BRADY_AP_VP_PERCENT: 0.01 %
MDC_IDC_STAT_BRADY_AP_VS_PERCENT: 18.92 %
MDC_IDC_STAT_BRADY_AS_VP_PERCENT: 0.03 %
MDC_IDC_STAT_BRADY_AS_VS_PERCENT: 81.04 %
MDC_IDC_STAT_BRADY_DTM_END: NORMAL
MDC_IDC_STAT_BRADY_DTM_START: NORMAL
MDC_IDC_STAT_BRADY_RA_PERCENT_PACED: 18.91 %
MDC_IDC_STAT_BRADY_RV_PERCENT_PACED: 0.04 %
MDC_IDC_STAT_EPISODE_RECENT_COUNT: 0
MDC_IDC_STAT_EPISODE_RECENT_COUNT: 1
MDC_IDC_STAT_EPISODE_RECENT_COUNT_DTM_END: NORMAL
MDC_IDC_STAT_EPISODE_RECENT_COUNT_DTM_START: NORMAL
MDC_IDC_STAT_EPISODE_TOTAL_COUNT: 0
MDC_IDC_STAT_EPISODE_TOTAL_COUNT: 27
MDC_IDC_STAT_EPISODE_TOTAL_COUNT_DTM_END: NORMAL
MDC_IDC_STAT_EPISODE_TOTAL_COUNT_DTM_START: NORMAL
MDC_IDC_STAT_EPISODE_TYPE: NORMAL

## 2023-07-18 ENCOUNTER — LAB REQUISITION (OUTPATIENT)
Dept: LAB | Facility: CLINIC | Age: 88
End: 2023-07-18
Payer: MEDICARE

## 2023-07-18 DIAGNOSIS — E78.5 HYPERLIPIDEMIA, UNSPECIFIED: ICD-10-CM

## 2023-07-18 DIAGNOSIS — I11.0 HYPERTENSIVE HEART DISEASE WITH HEART FAILURE (H): ICD-10-CM

## 2023-07-18 DIAGNOSIS — I50.32 CHRONIC DIASTOLIC (CONGESTIVE) HEART FAILURE (H): ICD-10-CM

## 2023-07-18 PROCEDURE — 80061 LIPID PANEL: CPT | Mod: ORL | Performed by: STUDENT IN AN ORGANIZED HEALTH CARE EDUCATION/TRAINING PROGRAM

## 2023-07-18 PROCEDURE — 80053 COMPREHEN METABOLIC PANEL: CPT | Mod: ORL | Performed by: STUDENT IN AN ORGANIZED HEALTH CARE EDUCATION/TRAINING PROGRAM

## 2023-07-19 LAB
ALBUMIN SERPL BCG-MCNC: 4 G/DL (ref 3.5–5.2)
ALP SERPL-CCNC: 74 U/L (ref 35–104)
ALT SERPL W P-5'-P-CCNC: 14 U/L (ref 0–50)
ANION GAP SERPL CALCULATED.3IONS-SCNC: 14 MMOL/L (ref 7–15)
AST SERPL W P-5'-P-CCNC: 22 U/L (ref 0–45)
BILIRUB SERPL-MCNC: 0.4 MG/DL
BUN SERPL-MCNC: 17.8 MG/DL (ref 8–23)
CALCIUM SERPL-MCNC: 8.9 MG/DL (ref 8.2–9.6)
CHLORIDE SERPL-SCNC: 107 MMOL/L (ref 98–107)
CHOLEST SERPL-MCNC: 154 MG/DL
CREAT SERPL-MCNC: 0.78 MG/DL (ref 0.51–0.95)
DEPRECATED HCO3 PLAS-SCNC: 21 MMOL/L (ref 22–29)
GFR SERPL CREATININE-BSD FRML MDRD: 70 ML/MIN/1.73M2
GLUCOSE SERPL-MCNC: 166 MG/DL (ref 70–99)
HDLC SERPL-MCNC: 49 MG/DL
LDLC SERPL CALC-MCNC: 83 MG/DL
NONHDLC SERPL-MCNC: 105 MG/DL
POTASSIUM SERPL-SCNC: 4.1 MMOL/L (ref 3.4–5.3)
PROT SERPL-MCNC: 6.2 G/DL (ref 6.4–8.3)
SODIUM SERPL-SCNC: 142 MMOL/L (ref 136–145)
TRIGL SERPL-MCNC: 109 MG/DL

## 2023-10-16 ENCOUNTER — ANCILLARY PROCEDURE (OUTPATIENT)
Dept: CARDIOLOGY | Facility: CLINIC | Age: 88
End: 2023-10-16
Attending: INTERNAL MEDICINE
Payer: MEDICARE

## 2023-10-16 DIAGNOSIS — Z95.0 CARDIAC PACEMAKER IN SITU: ICD-10-CM

## 2023-10-16 DIAGNOSIS — I49.5 SICK SINUS SYNDROME (H): ICD-10-CM

## 2023-10-18 ENCOUNTER — TELEPHONE (OUTPATIENT)
Dept: CARDIOLOGY | Facility: CLINIC | Age: 88
End: 2023-10-18

## 2023-10-18 NOTE — TELEPHONE ENCOUNTER
Encounter Type: Routine remote pacemaker transmission.   Device: [employer] Advisa.   Pacing % /Programmed: AP 17%,  0% at AAIR<=>DDDR 60/130.  Lead(s): Stable.  Battery longevity: 4yrs, 7mo estimated.  Presenting: Sinus and AP-VS  bpm.  Atrial high rates: since 7/14/23; None detected.  Anticoagulant: None. Takes ASA.  Ventricular High rates: since 7/14/23; two ventricular high rate episodes, episode #31 appears to be NSVT 21bts, duration 7 seconds, 180-200 bpm. The other episode appears to be PSVT.   Comments: Normal device function. Routed to device RN for review.   Plan: Next remote check scheduled for 1/26/24. FIDEL Marcial Device Specialist    Transmission was reviewed with patient and her neighbor who assists her. Per patient she often gets dizzy when walking or changing positions but can not directly correlate it with episode of NSVT that occurred on 10/3/2023 at 1100. Patient states that overall she has been feeling well. Patient's most recent EF was 60-65% by echo (2/23/2022)     Patient's neighbor questioned if taking patient's blood pressure periodically would be helpful, informed neighbor that it would be ok to take patient's blood pressure periodically. Patient was also reminded to maintain hydration. Per neighbor patient would really like to be seen by Dr. Ramirez again in the future. Results routed to Dr. Ramirez for clinical management of NSVT.     Alanis Flores, RN  Device Nurse

## 2023-10-20 LAB
MDC_IDC_EPISODE_DTM: NORMAL
MDC_IDC_EPISODE_DTM: NORMAL
MDC_IDC_EPISODE_DURATION: 2 S
MDC_IDC_EPISODE_DURATION: 4 S
MDC_IDC_EPISODE_ID: 30
MDC_IDC_EPISODE_ID: 31
MDC_IDC_EPISODE_TYPE: NORMAL
MDC_IDC_EPISODE_TYPE: NORMAL
MDC_IDC_LEAD_CONNECTION_STATUS: NORMAL
MDC_IDC_LEAD_CONNECTION_STATUS: NORMAL
MDC_IDC_LEAD_IMPLANT_DT: NORMAL
MDC_IDC_LEAD_IMPLANT_DT: NORMAL
MDC_IDC_LEAD_LOCATION: NORMAL
MDC_IDC_LEAD_LOCATION: NORMAL
MDC_IDC_LEAD_LOCATION_DETAIL_1: NORMAL
MDC_IDC_LEAD_LOCATION_DETAIL_1: NORMAL
MDC_IDC_LEAD_MFG: NORMAL
MDC_IDC_LEAD_MFG: NORMAL
MDC_IDC_LEAD_MODEL: NORMAL
MDC_IDC_LEAD_MODEL: NORMAL
MDC_IDC_LEAD_POLARITY_TYPE: NORMAL
MDC_IDC_LEAD_POLARITY_TYPE: NORMAL
MDC_IDC_LEAD_SERIAL: NORMAL
MDC_IDC_LEAD_SERIAL: NORMAL
MDC_IDC_LEAD_SPECIAL_FUNCTION: NORMAL
MDC_IDC_LEAD_SPECIAL_FUNCTION: NORMAL
MDC_IDC_MSMT_BATTERY_DTM: NORMAL
MDC_IDC_MSMT_BATTERY_REMAINING_LONGEVITY: 55 MO
MDC_IDC_MSMT_BATTERY_RRT_TRIGGER: 2.83
MDC_IDC_MSMT_BATTERY_STATUS: NORMAL
MDC_IDC_MSMT_BATTERY_VOLTAGE: 2.99 V
MDC_IDC_MSMT_LEADCHNL_RA_IMPEDANCE_VALUE: 323 OHM
MDC_IDC_MSMT_LEADCHNL_RA_IMPEDANCE_VALUE: 437 OHM
MDC_IDC_MSMT_LEADCHNL_RA_PACING_THRESHOLD_AMPLITUDE: 0.88 V
MDC_IDC_MSMT_LEADCHNL_RA_PACING_THRESHOLD_PULSEWIDTH: 0.4 MS
MDC_IDC_MSMT_LEADCHNL_RA_SENSING_INTR_AMPL: 2 MV
MDC_IDC_MSMT_LEADCHNL_RA_SENSING_INTR_AMPL: 2 MV
MDC_IDC_MSMT_LEADCHNL_RV_IMPEDANCE_VALUE: 361 OHM
MDC_IDC_MSMT_LEADCHNL_RV_IMPEDANCE_VALUE: 418 OHM
MDC_IDC_MSMT_LEADCHNL_RV_PACING_THRESHOLD_AMPLITUDE: 1 V
MDC_IDC_MSMT_LEADCHNL_RV_PACING_THRESHOLD_PULSEWIDTH: 0.4 MS
MDC_IDC_MSMT_LEADCHNL_RV_SENSING_INTR_AMPL: 6.12 MV
MDC_IDC_MSMT_LEADCHNL_RV_SENSING_INTR_AMPL: 6.12 MV
MDC_IDC_PG_IMPLANT_DTM: NORMAL
MDC_IDC_PG_MFG: NORMAL
MDC_IDC_PG_MODEL: NORMAL
MDC_IDC_PG_SERIAL: NORMAL
MDC_IDC_PG_TYPE: NORMAL
MDC_IDC_SESS_CLINIC_NAME: NORMAL
MDC_IDC_SESS_DTM: NORMAL
MDC_IDC_SESS_TYPE: NORMAL
MDC_IDC_SET_BRADY_AT_MODE_SWITCH_RATE: 171 {BEATS}/MIN
MDC_IDC_SET_BRADY_HYSTRATE: NORMAL
MDC_IDC_SET_BRADY_LOWRATE: 60 {BEATS}/MIN
MDC_IDC_SET_BRADY_MAX_SENSOR_RATE: 130 {BEATS}/MIN
MDC_IDC_SET_BRADY_MAX_TRACKING_RATE: 130 {BEATS}/MIN
MDC_IDC_SET_BRADY_MODE: NORMAL
MDC_IDC_SET_BRADY_PAV_DELAY_LOW: 180 MS
MDC_IDC_SET_BRADY_SAV_DELAY_LOW: 150 MS
MDC_IDC_SET_LEADCHNL_RA_PACING_AMPLITUDE: 1.5 V
MDC_IDC_SET_LEADCHNL_RA_PACING_ANODE_ELECTRODE_1: NORMAL
MDC_IDC_SET_LEADCHNL_RA_PACING_ANODE_LOCATION_1: NORMAL
MDC_IDC_SET_LEADCHNL_RA_PACING_CAPTURE_MODE: NORMAL
MDC_IDC_SET_LEADCHNL_RA_PACING_CATHODE_ELECTRODE_1: NORMAL
MDC_IDC_SET_LEADCHNL_RA_PACING_CATHODE_LOCATION_1: NORMAL
MDC_IDC_SET_LEADCHNL_RA_PACING_POLARITY: NORMAL
MDC_IDC_SET_LEADCHNL_RA_PACING_PULSEWIDTH: 0.4 MS
MDC_IDC_SET_LEADCHNL_RA_SENSING_ANODE_ELECTRODE_1: NORMAL
MDC_IDC_SET_LEADCHNL_RA_SENSING_ANODE_LOCATION_1: NORMAL
MDC_IDC_SET_LEADCHNL_RA_SENSING_CATHODE_ELECTRODE_1: NORMAL
MDC_IDC_SET_LEADCHNL_RA_SENSING_CATHODE_LOCATION_1: NORMAL
MDC_IDC_SET_LEADCHNL_RA_SENSING_POLARITY: NORMAL
MDC_IDC_SET_LEADCHNL_RA_SENSING_SENSITIVITY: 0.3 MV
MDC_IDC_SET_LEADCHNL_RV_PACING_AMPLITUDE: 1.5 V
MDC_IDC_SET_LEADCHNL_RV_PACING_ANODE_ELECTRODE_1: NORMAL
MDC_IDC_SET_LEADCHNL_RV_PACING_ANODE_LOCATION_1: NORMAL
MDC_IDC_SET_LEADCHNL_RV_PACING_CAPTURE_MODE: NORMAL
MDC_IDC_SET_LEADCHNL_RV_PACING_CATHODE_ELECTRODE_1: NORMAL
MDC_IDC_SET_LEADCHNL_RV_PACING_CATHODE_LOCATION_1: NORMAL
MDC_IDC_SET_LEADCHNL_RV_PACING_POLARITY: NORMAL
MDC_IDC_SET_LEADCHNL_RV_PACING_PULSEWIDTH: 0.4 MS
MDC_IDC_SET_LEADCHNL_RV_SENSING_ANODE_ELECTRODE_1: NORMAL
MDC_IDC_SET_LEADCHNL_RV_SENSING_ANODE_LOCATION_1: NORMAL
MDC_IDC_SET_LEADCHNL_RV_SENSING_CATHODE_ELECTRODE_1: NORMAL
MDC_IDC_SET_LEADCHNL_RV_SENSING_CATHODE_LOCATION_1: NORMAL
MDC_IDC_SET_LEADCHNL_RV_SENSING_POLARITY: NORMAL
MDC_IDC_SET_LEADCHNL_RV_SENSING_SENSITIVITY: 0.9 MV
MDC_IDC_SET_ZONE_DETECTION_INTERVAL: 350 MS
MDC_IDC_SET_ZONE_DETECTION_INTERVAL: 400 MS
MDC_IDC_SET_ZONE_STATUS: NORMAL
MDC_IDC_SET_ZONE_STATUS: NORMAL
MDC_IDC_SET_ZONE_TYPE: NORMAL
MDC_IDC_SET_ZONE_VENDOR_TYPE: NORMAL
MDC_IDC_STAT_AT_BURDEN_PERCENT: 0 %
MDC_IDC_STAT_AT_DTM_END: NORMAL
MDC_IDC_STAT_AT_DTM_START: NORMAL
MDC_IDC_STAT_BRADY_AP_VP_PERCENT: 0.01 %
MDC_IDC_STAT_BRADY_AP_VS_PERCENT: 16.97 %
MDC_IDC_STAT_BRADY_AS_VP_PERCENT: 0.03 %
MDC_IDC_STAT_BRADY_AS_VS_PERCENT: 82.99 %
MDC_IDC_STAT_BRADY_DTM_END: NORMAL
MDC_IDC_STAT_BRADY_DTM_START: NORMAL
MDC_IDC_STAT_BRADY_RA_PERCENT_PACED: 16.97 %
MDC_IDC_STAT_BRADY_RV_PERCENT_PACED: 0.04 %
MDC_IDC_STAT_EPISODE_RECENT_COUNT: 0
MDC_IDC_STAT_EPISODE_RECENT_COUNT: 2
MDC_IDC_STAT_EPISODE_RECENT_COUNT_DTM_END: NORMAL
MDC_IDC_STAT_EPISODE_RECENT_COUNT_DTM_START: NORMAL
MDC_IDC_STAT_EPISODE_TOTAL_COUNT: 0
MDC_IDC_STAT_EPISODE_TOTAL_COUNT: 29
MDC_IDC_STAT_EPISODE_TOTAL_COUNT_DTM_END: NORMAL
MDC_IDC_STAT_EPISODE_TOTAL_COUNT_DTM_START: NORMAL
MDC_IDC_STAT_EPISODE_TYPE: NORMAL
MDC_IDC_STAT_TACHYTHERAPY_RECENT_DTM_END: NORMAL
MDC_IDC_STAT_TACHYTHERAPY_RECENT_DTM_START: NORMAL
MDC_IDC_STAT_TACHYTHERAPY_TOTAL_DTM_END: NORMAL
MDC_IDC_STAT_TACHYTHERAPY_TOTAL_DTM_START: NORMAL

## 2023-10-20 PROCEDURE — 93296 REM INTERROG EVL PM/IDS: CPT | Performed by: INTERNAL MEDICINE

## 2023-10-20 PROCEDURE — 93294 REM INTERROG EVL PM/LDLS PM: CPT | Performed by: INTERNAL MEDICINE

## 2024-01-01 ENCOUNTER — ANCILLARY PROCEDURE (OUTPATIENT)
Dept: CARDIOLOGY | Facility: CLINIC | Age: 89
End: 2024-01-01
Attending: INTERNAL MEDICINE
Payer: MEDICARE

## 2024-01-01 ENCOUNTER — LAB REQUISITION (OUTPATIENT)
Dept: LAB | Facility: CLINIC | Age: 89
End: 2024-01-01
Payer: MEDICARE

## 2024-01-01 DIAGNOSIS — I49.5 SICK SINUS SYNDROME (H): ICD-10-CM

## 2024-01-01 DIAGNOSIS — Z95.0 CARDIAC PACEMAKER IN SITU: ICD-10-CM

## 2024-01-01 DIAGNOSIS — I50.9 HEART FAILURE, UNSPECIFIED (H): ICD-10-CM

## 2024-01-01 LAB
ANION GAP SERPL CALCULATED.3IONS-SCNC: 13 MMOL/L (ref 7–15)
BASOPHILS # BLD AUTO: 0.1 10E3/UL (ref 0–0.2)
BASOPHILS NFR BLD AUTO: 1 %
BUN SERPL-MCNC: 10.5 MG/DL (ref 8–23)
CALCIUM SERPL-MCNC: 8.8 MG/DL (ref 8.2–9.6)
CHLORIDE SERPL-SCNC: 104 MMOL/L (ref 98–107)
CREAT SERPL-MCNC: 0.63 MG/DL (ref 0.51–0.95)
DEPRECATED HCO3 PLAS-SCNC: 24 MMOL/L (ref 22–29)
EGFRCR SERPLBLD CKD-EPI 2021: 81 ML/MIN/1.73M2
EOSINOPHIL # BLD AUTO: 0.1 10E3/UL (ref 0–0.7)
EOSINOPHIL NFR BLD AUTO: 1 %
ERYTHROCYTE [DISTWIDTH] IN BLOOD BY AUTOMATED COUNT: 13.2 % (ref 10–15)
GLUCOSE SERPL-MCNC: 242 MG/DL (ref 70–99)
HCT VFR BLD AUTO: 41.7 % (ref 35–47)
HGB BLD-MCNC: 13.1 G/DL (ref 11.7–15.7)
IMM GRANULOCYTES # BLD: 0 10E3/UL
IMM GRANULOCYTES NFR BLD: 0 %
LYMPHOCYTES # BLD AUTO: 1.5 10E3/UL (ref 0.8–5.3)
LYMPHOCYTES NFR BLD AUTO: 23 %
MCH RBC QN AUTO: 31.8 PG (ref 26.5–33)
MCHC RBC AUTO-ENTMCNC: 31.4 G/DL (ref 31.5–36.5)
MCV RBC AUTO: 101 FL (ref 78–100)
MDC_IDC_EPISODE_DTM: NORMAL
MDC_IDC_EPISODE_DURATION: 0 S
MDC_IDC_EPISODE_DURATION: 1 S
MDC_IDC_EPISODE_ID: 36
MDC_IDC_EPISODE_ID: 37
MDC_IDC_EPISODE_ID: 38
MDC_IDC_EPISODE_ID: 39
MDC_IDC_EPISODE_ID: 40
MDC_IDC_EPISODE_TYPE: NORMAL
MDC_IDC_LEAD_CONNECTION_STATUS: NORMAL
MDC_IDC_LEAD_IMPLANT_DT: NORMAL
MDC_IDC_LEAD_LOCATION: NORMAL
MDC_IDC_LEAD_LOCATION_DETAIL_1: NORMAL
MDC_IDC_LEAD_MFG: NORMAL
MDC_IDC_LEAD_MODEL: NORMAL
MDC_IDC_LEAD_POLARITY_TYPE: NORMAL
MDC_IDC_LEAD_SERIAL: NORMAL
MDC_IDC_LEAD_SPECIAL_FUNCTION: NORMAL
MDC_IDC_MSMT_BATTERY_DTM: NORMAL
MDC_IDC_MSMT_BATTERY_REMAINING_LONGEVITY: 39 MO
MDC_IDC_MSMT_BATTERY_REMAINING_LONGEVITY: 44 MO
MDC_IDC_MSMT_BATTERY_REMAINING_LONGEVITY: 49 MO
MDC_IDC_MSMT_BATTERY_RRT_TRIGGER: 2.83
MDC_IDC_MSMT_BATTERY_STATUS: NORMAL
MDC_IDC_MSMT_BATTERY_VOLTAGE: 2.97 V
MDC_IDC_MSMT_BATTERY_VOLTAGE: 2.97 V
MDC_IDC_MSMT_BATTERY_VOLTAGE: 2.98 V
MDC_IDC_MSMT_LEADCHNL_RA_IMPEDANCE_VALUE: 323 OHM
MDC_IDC_MSMT_LEADCHNL_RA_IMPEDANCE_VALUE: 323 OHM
MDC_IDC_MSMT_LEADCHNL_RA_IMPEDANCE_VALUE: 342 OHM
MDC_IDC_MSMT_LEADCHNL_RA_IMPEDANCE_VALUE: 418 OHM
MDC_IDC_MSMT_LEADCHNL_RA_IMPEDANCE_VALUE: 437 OHM
MDC_IDC_MSMT_LEADCHNL_RA_IMPEDANCE_VALUE: 456 OHM
MDC_IDC_MSMT_LEADCHNL_RA_PACING_THRESHOLD_AMPLITUDE: 0.88 V
MDC_IDC_MSMT_LEADCHNL_RA_PACING_THRESHOLD_AMPLITUDE: 1 V
MDC_IDC_MSMT_LEADCHNL_RA_PACING_THRESHOLD_AMPLITUDE: 1 V
MDC_IDC_MSMT_LEADCHNL_RA_PACING_THRESHOLD_PULSEWIDTH: 0.4 MS
MDC_IDC_MSMT_LEADCHNL_RA_SENSING_INTR_AMPL: 1.88 MV
MDC_IDC_MSMT_LEADCHNL_RA_SENSING_INTR_AMPL: 1.88 MV
MDC_IDC_MSMT_LEADCHNL_RA_SENSING_INTR_AMPL: 2.12 MV
MDC_IDC_MSMT_LEADCHNL_RA_SENSING_INTR_AMPL: 2.12 MV
MDC_IDC_MSMT_LEADCHNL_RA_SENSING_INTR_AMPL: 3.12 MV
MDC_IDC_MSMT_LEADCHNL_RA_SENSING_INTR_AMPL: 3.12 MV
MDC_IDC_MSMT_LEADCHNL_RV_IMPEDANCE_VALUE: 361 OHM
MDC_IDC_MSMT_LEADCHNL_RV_IMPEDANCE_VALUE: 380 OHM
MDC_IDC_MSMT_LEADCHNL_RV_IMPEDANCE_VALUE: 380 OHM
MDC_IDC_MSMT_LEADCHNL_RV_IMPEDANCE_VALUE: 418 OHM
MDC_IDC_MSMT_LEADCHNL_RV_IMPEDANCE_VALUE: 437 OHM
MDC_IDC_MSMT_LEADCHNL_RV_IMPEDANCE_VALUE: 437 OHM
MDC_IDC_MSMT_LEADCHNL_RV_PACING_THRESHOLD_AMPLITUDE: 1 V
MDC_IDC_MSMT_LEADCHNL_RV_PACING_THRESHOLD_AMPLITUDE: 1 V
MDC_IDC_MSMT_LEADCHNL_RV_PACING_THRESHOLD_AMPLITUDE: 1.12 V
MDC_IDC_MSMT_LEADCHNL_RV_PACING_THRESHOLD_PULSEWIDTH: 0.4 MS
MDC_IDC_MSMT_LEADCHNL_RV_SENSING_INTR_AMPL: 7.38 MV
MDC_IDC_MSMT_LEADCHNL_RV_SENSING_INTR_AMPL: 7.38 MV
MDC_IDC_MSMT_LEADCHNL_RV_SENSING_INTR_AMPL: 7.62 MV
MDC_IDC_MSMT_LEADCHNL_RV_SENSING_INTR_AMPL: 7.62 MV
MDC_IDC_MSMT_LEADCHNL_RV_SENSING_INTR_AMPL: 8.5 MV
MDC_IDC_MSMT_LEADCHNL_RV_SENSING_INTR_AMPL: 8.5 MV
MDC_IDC_PG_IMPLANT_DTM: NORMAL
MDC_IDC_PG_MFG: NORMAL
MDC_IDC_PG_MODEL: NORMAL
MDC_IDC_PG_SERIAL: NORMAL
MDC_IDC_PG_TYPE: NORMAL
MDC_IDC_SESS_CLINIC_NAME: NORMAL
MDC_IDC_SESS_DTM: NORMAL
MDC_IDC_SESS_TYPE: NORMAL
MDC_IDC_SET_BRADY_AT_MODE_SWITCH_RATE: 171 {BEATS}/MIN
MDC_IDC_SET_BRADY_HYSTRATE: NORMAL
MDC_IDC_SET_BRADY_LOWRATE: 60 {BEATS}/MIN
MDC_IDC_SET_BRADY_MAX_SENSOR_RATE: 130 {BEATS}/MIN
MDC_IDC_SET_BRADY_MAX_TRACKING_RATE: 130 {BEATS}/MIN
MDC_IDC_SET_BRADY_MODE: NORMAL
MDC_IDC_SET_BRADY_PAV_DELAY_LOW: 180 MS
MDC_IDC_SET_BRADY_SAV_DELAY_LOW: 150 MS
MDC_IDC_SET_LEADCHNL_RA_PACING_AMPLITUDE: 1.5 V
MDC_IDC_SET_LEADCHNL_RA_PACING_ANODE_ELECTRODE_1: NORMAL
MDC_IDC_SET_LEADCHNL_RA_PACING_ANODE_LOCATION_1: NORMAL
MDC_IDC_SET_LEADCHNL_RA_PACING_CAPTURE_MODE: NORMAL
MDC_IDC_SET_LEADCHNL_RA_PACING_CATHODE_ELECTRODE_1: NORMAL
MDC_IDC_SET_LEADCHNL_RA_PACING_CATHODE_LOCATION_1: NORMAL
MDC_IDC_SET_LEADCHNL_RA_PACING_POLARITY: NORMAL
MDC_IDC_SET_LEADCHNL_RA_PACING_PULSEWIDTH: 0.4 MS
MDC_IDC_SET_LEADCHNL_RA_SENSING_ANODE_ELECTRODE_1: NORMAL
MDC_IDC_SET_LEADCHNL_RA_SENSING_ANODE_LOCATION_1: NORMAL
MDC_IDC_SET_LEADCHNL_RA_SENSING_CATHODE_ELECTRODE_1: NORMAL
MDC_IDC_SET_LEADCHNL_RA_SENSING_CATHODE_LOCATION_1: NORMAL
MDC_IDC_SET_LEADCHNL_RA_SENSING_POLARITY: NORMAL
MDC_IDC_SET_LEADCHNL_RA_SENSING_SENSITIVITY: 0.3 MV
MDC_IDC_SET_LEADCHNL_RV_PACING_AMPLITUDE: 1.5 V
MDC_IDC_SET_LEADCHNL_RV_PACING_AMPLITUDE: 1.5 V
MDC_IDC_SET_LEADCHNL_RV_PACING_AMPLITUDE: 1.75 V
MDC_IDC_SET_LEADCHNL_RV_PACING_ANODE_ELECTRODE_1: NORMAL
MDC_IDC_SET_LEADCHNL_RV_PACING_ANODE_LOCATION_1: NORMAL
MDC_IDC_SET_LEADCHNL_RV_PACING_CAPTURE_MODE: NORMAL
MDC_IDC_SET_LEADCHNL_RV_PACING_CATHODE_ELECTRODE_1: NORMAL
MDC_IDC_SET_LEADCHNL_RV_PACING_CATHODE_LOCATION_1: NORMAL
MDC_IDC_SET_LEADCHNL_RV_PACING_POLARITY: NORMAL
MDC_IDC_SET_LEADCHNL_RV_PACING_PULSEWIDTH: 0.4 MS
MDC_IDC_SET_LEADCHNL_RV_SENSING_ANODE_ELECTRODE_1: NORMAL
MDC_IDC_SET_LEADCHNL_RV_SENSING_ANODE_LOCATION_1: NORMAL
MDC_IDC_SET_LEADCHNL_RV_SENSING_CATHODE_ELECTRODE_1: NORMAL
MDC_IDC_SET_LEADCHNL_RV_SENSING_CATHODE_LOCATION_1: NORMAL
MDC_IDC_SET_LEADCHNL_RV_SENSING_POLARITY: NORMAL
MDC_IDC_SET_LEADCHNL_RV_SENSING_SENSITIVITY: 0.9 MV
MDC_IDC_SET_ZONE_DETECTION_INTERVAL: 350 MS
MDC_IDC_SET_ZONE_DETECTION_INTERVAL: 400 MS
MDC_IDC_SET_ZONE_STATUS: NORMAL
MDC_IDC_SET_ZONE_TYPE: NORMAL
MDC_IDC_SET_ZONE_VENDOR_TYPE: NORMAL
MDC_IDC_STAT_AT_BURDEN_PERCENT: 0 %
MDC_IDC_STAT_AT_DTM_END: NORMAL
MDC_IDC_STAT_AT_DTM_START: NORMAL
MDC_IDC_STAT_BRADY_AP_VP_PERCENT: 0.01 %
MDC_IDC_STAT_BRADY_AP_VP_PERCENT: 0.01 %
MDC_IDC_STAT_BRADY_AP_VP_PERCENT: 0.02 %
MDC_IDC_STAT_BRADY_AP_VS_PERCENT: 19.54 %
MDC_IDC_STAT_BRADY_AP_VS_PERCENT: 20.56 %
MDC_IDC_STAT_BRADY_AP_VS_PERCENT: 5.97 %
MDC_IDC_STAT_BRADY_AS_VP_PERCENT: 0.02 %
MDC_IDC_STAT_BRADY_AS_VP_PERCENT: 0.03 %
MDC_IDC_STAT_BRADY_AS_VP_PERCENT: 0.03 %
MDC_IDC_STAT_BRADY_AS_VS_PERCENT: 79.4 %
MDC_IDC_STAT_BRADY_AS_VS_PERCENT: 80.42 %
MDC_IDC_STAT_BRADY_AS_VS_PERCENT: 93.99 %
MDC_IDC_STAT_BRADY_DTM_END: NORMAL
MDC_IDC_STAT_BRADY_DTM_START: NORMAL
MDC_IDC_STAT_BRADY_RA_PERCENT_PACED: 19.55 %
MDC_IDC_STAT_BRADY_RA_PERCENT_PACED: 20.57 %
MDC_IDC_STAT_BRADY_RA_PERCENT_PACED: 5.98 %
MDC_IDC_STAT_BRADY_RV_PERCENT_PACED: 0.04 %
MDC_IDC_STAT_EPISODE_RECENT_COUNT: 0
MDC_IDC_STAT_EPISODE_RECENT_COUNT: 2
MDC_IDC_STAT_EPISODE_RECENT_COUNT: 3
MDC_IDC_STAT_EPISODE_RECENT_COUNT_DTM_END: NORMAL
MDC_IDC_STAT_EPISODE_RECENT_COUNT_DTM_START: NORMAL
MDC_IDC_STAT_EPISODE_TOTAL_COUNT: 0
MDC_IDC_STAT_EPISODE_TOTAL_COUNT: 32
MDC_IDC_STAT_EPISODE_TOTAL_COUNT: 35
MDC_IDC_STAT_EPISODE_TOTAL_COUNT: 37
MDC_IDC_STAT_EPISODE_TOTAL_COUNT_DTM_END: NORMAL
MDC_IDC_STAT_EPISODE_TOTAL_COUNT_DTM_START: NORMAL
MDC_IDC_STAT_EPISODE_TYPE: NORMAL
MDC_IDC_STAT_TACHYTHERAPY_RECENT_DTM_END: NORMAL
MDC_IDC_STAT_TACHYTHERAPY_RECENT_DTM_START: NORMAL
MDC_IDC_STAT_TACHYTHERAPY_TOTAL_DTM_END: NORMAL
MDC_IDC_STAT_TACHYTHERAPY_TOTAL_DTM_START: NORMAL
MONOCYTES # BLD AUTO: 0.5 10E3/UL (ref 0–1.3)
MONOCYTES NFR BLD AUTO: 8 %
NEUTROPHILS # BLD AUTO: 4.5 10E3/UL (ref 1.6–8.3)
NEUTROPHILS NFR BLD AUTO: 67 %
NRBC # BLD AUTO: 0 10E3/UL
NRBC BLD AUTO-RTO: 0 /100
PLATELET # BLD AUTO: 369 10E3/UL (ref 150–450)
POTASSIUM SERPL-SCNC: 3.8 MMOL/L (ref 3.4–5.3)
RBC # BLD AUTO: 4.12 10E6/UL (ref 3.8–5.2)
SODIUM SERPL-SCNC: 141 MMOL/L (ref 135–145)
WBC # BLD AUTO: 6.7 10E3/UL (ref 4–11)

## 2024-01-01 PROCEDURE — 80048 BASIC METABOLIC PNL TOTAL CA: CPT | Mod: ORL | Performed by: FAMILY MEDICINE

## 2024-01-01 PROCEDURE — 93296 REM INTERROG EVL PM/IDS: CPT | Performed by: INTERNAL MEDICINE

## 2024-01-01 PROCEDURE — 93294 REM INTERROG EVL PM/LDLS PM: CPT | Performed by: INTERNAL MEDICINE

## 2024-01-01 PROCEDURE — 36415 COLL VENOUS BLD VENIPUNCTURE: CPT | Mod: ORL | Performed by: FAMILY MEDICINE

## 2024-01-01 PROCEDURE — 85025 COMPLETE CBC W/AUTO DIFF WBC: CPT | Mod: ORL | Performed by: FAMILY MEDICINE

## 2024-01-01 PROCEDURE — P9604 ONE-WAY ALLOW PRORATED TRIP: HCPCS | Mod: ORL | Performed by: FAMILY MEDICINE
